# Patient Record
Sex: FEMALE | Race: BLACK OR AFRICAN AMERICAN | NOT HISPANIC OR LATINO | Employment: UNEMPLOYED | URBAN - METROPOLITAN AREA
[De-identification: names, ages, dates, MRNs, and addresses within clinical notes are randomized per-mention and may not be internally consistent; named-entity substitution may affect disease eponyms.]

---

## 2019-02-27 ENCOUNTER — CLINICAL SUPPORT (OUTPATIENT)
Dept: URGENT CARE | Facility: CLINIC | Age: 49
End: 2019-02-27
Payer: OTHER GOVERNMENT

## 2019-02-27 VITALS
BODY MASS INDEX: 40.91 KG/M2 | TEMPERATURE: 97 F | HEART RATE: 75 BPM | WEIGHT: 277 LBS | RESPIRATION RATE: 16 BRPM | SYSTOLIC BLOOD PRESSURE: 126 MMHG | OXYGEN SATURATION: 99 % | DIASTOLIC BLOOD PRESSURE: 75 MMHG

## 2019-02-27 DIAGNOSIS — J11.1 INFLUENZA: ICD-10-CM

## 2019-02-27 DIAGNOSIS — R50.9 FEVER, UNSPECIFIED FEVER CAUSE: ICD-10-CM

## 2019-02-27 DIAGNOSIS — R30.0 BURNING WITH URINATION: Primary | ICD-10-CM

## 2019-02-27 LAB
BILIRUB UR QL STRIP: NEGATIVE
CTP QC/QA: YES
FLUAV AG NPH QL: POSITIVE
FLUBV AG NPH QL: NEGATIVE
GLUCOSE SERPL-MCNC: 110 MG/DL (ref 70–110)
GLUCOSE UR QL STRIP: POSITIVE
KETONES UR QL STRIP: NEGATIVE
LEUKOCYTE ESTERASE UR QL STRIP: NEGATIVE
PH, POC UA: 5.5
POC BLOOD, URINE: NEGATIVE
POC NITRATES, URINE: NEGATIVE
PROT UR QL STRIP: NEGATIVE
SP GR UR STRIP: 1.02 (ref 1–1.03)
UROBILINOGEN UR STRIP-ACNC: ABNORMAL (ref 0.1–1.1)

## 2019-02-27 PROCEDURE — 87804 INFLUENZA ASSAY W/OPTIC: CPT | Mod: QW,,, | Performed by: NURSE PRACTITIONER

## 2019-02-27 PROCEDURE — 99204 OFFICE O/P NEW MOD 45 MIN: CPT | Mod: 25,S$GLB,, | Performed by: NURSE PRACTITIONER

## 2019-02-27 PROCEDURE — 81003 URINALYSIS AUTO W/O SCOPE: CPT | Mod: QW,S$GLB,, | Performed by: NURSE PRACTITIONER

## 2019-02-27 PROCEDURE — 81003 POCT URINALYSIS, DIPSTICK, AUTOMATED, W/O SCOPE: ICD-10-PCS | Mod: QW,S$GLB,, | Performed by: NURSE PRACTITIONER

## 2019-02-27 PROCEDURE — 82962 POCT GLUCOSE, HAND-HELD DEVICE: ICD-10-PCS | Mod: ,,, | Performed by: NURSE PRACTITIONER

## 2019-02-27 PROCEDURE — 87804 POCT INFLUENZA A/B: ICD-10-PCS | Mod: 59,QW,, | Performed by: NURSE PRACTITIONER

## 2019-02-27 PROCEDURE — 99204 PR OFFICE/OUTPT VISIT, NEW, LEVL IV, 45-59 MIN: ICD-10-PCS | Mod: 25,S$GLB,, | Performed by: NURSE PRACTITIONER

## 2019-02-27 PROCEDURE — 82962 GLUCOSE BLOOD TEST: CPT | Mod: ,,, | Performed by: NURSE PRACTITIONER

## 2019-02-27 RX ORDER — PIOGLITAZONEHYDROCHLORIDE 30 MG/1
30 TABLET ORAL DAILY
Refills: 3 | COMMUNITY
Start: 2019-01-23 | End: 2021-03-01

## 2019-02-27 RX ORDER — OMEPRAZOLE 20 MG/1
20 CAPSULE, DELAYED RELEASE ORAL DAILY
Refills: 2 | COMMUNITY
Start: 2019-01-23 | End: 2022-08-03

## 2019-02-27 RX ORDER — EMPAGLIFLOZIN AND LINAGLIPTIN 10; 5 MG/1; MG/1
10 TABLET, FILM COATED ORAL DAILY
Refills: 3 | COMMUNITY
Start: 2018-12-27 | End: 2021-03-01

## 2019-02-27 RX ORDER — VALSARTAN AND HYDROCHLOROTHIAZIDE 160; 25 MG/1; MG/1
160 TABLET ORAL DAILY
Refills: 3 | COMMUNITY
Start: 2019-01-23 | End: 2022-10-11 | Stop reason: SDUPTHER

## 2019-02-27 RX ORDER — NITROFURANTOIN 25; 75 MG/1; MG/1
100 CAPSULE ORAL 2 TIMES DAILY
Qty: 10 CAPSULE | Refills: 0 | Status: SHIPPED | OUTPATIENT
Start: 2019-02-27 | End: 2019-03-04

## 2019-02-27 NOTE — PROGRESS NOTES
"Subjective: post nasal drip, stuffy nose, runny nose, bodyaches, sore throat, right ear pain, pt also states "frequent urination with burning, cloudy urine"       Patient ID: Patria Bennett is a 48 y.o. female.    Vitals:  weight is 125.6 kg (277 lb). Her temperature is 97.1 °F (36.2 °C). Her blood pressure is 126/75 and her pulse is 75. Her respiration is 16 and oxygen saturation is 99%.     Chief Complaint: Sinus Problem (post nasal drip, stuffy nose, sore throat, cough) and Urinary Tract Infection (frequent urination, burning, cloudy urine)    Sinus Problem   This is a new problem. The current episode started in the past 7 days. Associated symptoms include chills, congestion, coughing, ear pain, sinus pressure and a sore throat. Pertinent negatives include no diaphoresis or shortness of breath. Past treatments include oral decongestants. The treatment provided mild relief.       Constitution: Positive for chills and fever. Negative for sweating and fatigue.   HENT: Positive for ear pain, congestion, sinus pressure and sore throat. Negative for sinus pain and voice change.    Neck: negative. Negative for painful lymph nodes.   Cardiovascular: Negative.    Eyes: Negative for eye redness.   Respiratory: Positive for cough. Negative for chest tightness, sputum production, bloody sputum, COPD, shortness of breath, stridor, wheezing and asthma.    Gastrointestinal: Negative for nausea and vomiting.   Endocrine: negative.   Genitourinary: Positive for dysuria and frequency.   Musculoskeletal: Negative.  Negative for muscle ache.   Skin: Negative for rash.   Allergic/Immunologic: Negative for seasonal allergies and asthma.   Neurological: Negative.    Hematologic/Lymphatic: Negative for swollen lymph nodes.   Psychiatric/Behavioral: Negative.        Objective:      Physical Exam   Constitutional: She is oriented to person, place, and time. She appears well-developed and well-nourished. She is cooperative.  Non-toxic " appearance. She does not appear ill. No distress.   HENT:   Head: Normocephalic and atraumatic.   Right Ear: Hearing, tympanic membrane, external ear and ear canal normal.   Left Ear: Hearing, tympanic membrane, external ear and ear canal normal.   Nose: Nose normal. No mucosal edema, rhinorrhea or nasal deformity. No epistaxis. Right sinus exhibits no maxillary sinus tenderness and no frontal sinus tenderness. Left sinus exhibits no maxillary sinus tenderness and no frontal sinus tenderness.   Mouth/Throat: Uvula is midline, oropharynx is clear and moist and mucous membranes are normal. No trismus in the jaw. Normal dentition. No uvula swelling. No posterior oropharyngeal erythema.   Eyes: Conjunctivae and lids are normal. No scleral icterus.   Sclera clear bilat   Neck: Trachea normal, full passive range of motion without pain and phonation normal. Neck supple.   Cardiovascular: Normal rate, regular rhythm, normal heart sounds, intact distal pulses and normal pulses.   Pulmonary/Chest: Effort normal and breath sounds normal. No respiratory distress.   Abdominal: Soft. Normal appearance and bowel sounds are normal. She exhibits no distension. There is no tenderness.   Musculoskeletal: Normal range of motion. She exhibits no edema or deformity.   Neurological: She is alert and oriented to person, place, and time. She exhibits normal muscle tone. Coordination normal.   Skin: Skin is warm, dry and intact. Capillary refill takes less than 2 seconds. She is not diaphoretic. No pallor.   Psychiatric: She has a normal mood and affect. Her speech is normal and behavior is normal. Judgment and thought content normal. Cognition and memory are normal.   Nursing note and vitals reviewed.      Assessment:       1. Burning with urination    2. Fever, unspecified fever cause    3. Influenza        Plan:     UA negative for infection. Will follow culture. Influenza A positive. Rx: macrobid and xofluza.     Burning with  urination  -     POCT Urinalysis, Dipstick, Automated, W/O Scope  -     Culture, Urine  -     nitrofurantoin, macrocrystal-monohydrate, (MACROBID) 100 MG capsule; Take 1 capsule (100 mg total) by mouth 2 (two) times daily. for 5 days  Dispense: 10 capsule; Refill: 0    Fever, unspecified fever cause  -     POCT Influenza A/B  -     POCT Glucose, Hand-Held Device    Influenza  -     baloxavir marboxil (XOFLUZA) 40 mg Tab; Take 80 mg by mouth once. for 1 dose  Dispense: 2 tablet; Refill: 0

## 2019-02-27 NOTE — PATIENT INSTRUCTIONS
"  Dysuria     Painful urination (dysuria) is often caused by a problem in the urinary tract.   Dysuria is pain felt during urination. It is often described as a burning. Learn more about this problem and how it can be treated.  What causes dysuria?  Possible causes include:  · Infection with a bacteria or virus such as a urinary tract infection (UTI or a sexually transmitted infection (STI)  · Sensitivity or allergy to chemicals such as those found in lotions and other products  · Prostate or bladder problems  · Radiation therapy to the pelvic area  How is dysuria diagnosed?  Your healthcare provider will examine you. He or she will ask about your symptoms and health. After talking with you and doing a physical exam, your healthcare provider may know what is causing your dysuria. He or she will usually request  a sample of your urine. Tests of your urine, or a "urinalysis," are done. A urinalysis may include:  · Looking at the urine sample (visual exam)  · Checking for substances (chemical exam)  · Looking at a small amount under a microscope (microscopic exam)  Some parts of the urinalysis may be done in the provider's office and some in a lab. And, the urine sample may be checked for bacteria and yeast (urine culture). Your healthcare provider will tell you more about these tests if they are needed.  How is dysuria treated?  Treatment depends on the cause. If you have a bacterial infection, you may need antibiotics. You may be given medicines to make it easier for you to urinate and help relieve pain. Your healthcare provider can tell you more about your treatment options. Untreated, symptoms may get worse.  When to call your healthcare provider  Call the healthcare provider right away if you have any of the following:  · Fever of 100.4°F (38°C) or higher   · No improvement after three days of treatment  · Trouble urinating because of pain  · New or increased discharge from the vagina or penis  · Rash or joint " pain  · Increased back or abdominal pain  · Enlarged painful lymph nodes (lumps) in the groin   Date Last Reviewed: 1/1/2017  © 8037-1618 [employer]. [address] [place] [address], [place] [address]. All rights reserved. This information is not intended as a substitute for professional medical care. Always follow your healthcare professional's instructions.        The Flu (Influenza)     The virus that causes the flu spreads through the air in droplets when someone who has the flu coughs, sneezes, laughs, or talks.   The flu (influenza) is an infection that affects your respiratory tract. This tract is made up of your mouth, nose, and lungs, and the passages between them. Unlike a cold, the flu can make you very ill. And it can lead to pneumonia, a serious lung infection. The flu can have serious complications and even cause death.  Who is at risk for the flu?  Anyone can get the flu. But you are more likely to become infected if you:  · Have a weakened immune system  · Work in a healthcare setting where you may be exposed to flu germs  · Live or work with someone who has the flu  · Havent had an annual flu shot  How does the flu spread?  The flu is caused by a virus. The virus spreads through the air in droplets when someone who has the flu coughs, sneezes, laughs, or talks. You can become infected when you inhale these viruses directly. You can also become infected when you touch a surface on which the droplets have landed and then transfer the germs to your eyes, nose, or mouth. Touching used tissues, or sharing utensils, drinking glasses, or a toothbrush from an infected person can expose you to flu viruses, too.  What are the symptoms of the flu?  Flu symptoms tend to come on quickly and may last a few days to a few weeks. They include:  · Fever usually higher than 100.4°F  (38°C) and chills  · Sore throat and headache  · Dry cough  · Runny nose  · Tiredness and weakness  · Muscle aches  Who is at risk  for flu complications?  For some people, the flu can be very serious. The risk for complications is greater for:  · Children younger than age 5  · Adults ages 65 and older  · People with a chronic illness such as diabetes or heart, kidney, or lung disease  · People who live in a nursing home or long-term care facility   How is the flu treated?  The flu usually gets better after 7 days or so. In some cases, your healthcare provider may prescribe an antiviral medicine. This may help you get well a little sooner. For the medicine to help, you need to take it as soon as possible (ideally within 48 hours) after your symptoms start. If you develop pneumonia or other serious illness, you may need to stay in the hospital.  Easing flu symptoms  · Drink lots of fluids such as water, juice, and warm soup. A good rule is to drink enough so that you urinate your normal amount.  · Get plenty of rest.  · Ask your healthcare provider what to take for fever and pain.  · Call your provider if your fever is 100.4°F (38°C) or higher, or you become dizzy, lightheaded, or short of breath.  Taking steps to protect others  · Wash your hands often, especially after coughing or sneezing. Or clean your hands with an alcohol-based hand  containing at least 60% alcohol.  · Cough or sneeze into a tissue. Then throw the tissue away and wash your hands. If you dont have a tissue, cough and sneeze into your elbow.  · Stay home until at least 24 hours after you no longer have a fever or chills. Be sure the fever isnt being hidden by fever-reducing medicine.  · Dont share food, utensils, drinking glasses, or a toothbrush with others.  · Ask your healthcare provider if others in your household should get antiviral medicine to help them avoid infection.  How can the flu be prevented?  · One of the best ways to avoid the flu is to get a flu vaccine each year. The virus that causes the flu changes from year to year. For that reason, healthcare  providers recommend getting the flu vaccine each year, as soon as it's available in your area. The vaccine is given as a shot. Your healthcare provider can tell you which vaccine is right for you. A nasal spray is also available but is not recommended for the 8226-8173 flu season. The CDC says this is because the nasal spray did not seem to protect against the flu over the last several flu seasons. In the past, it was meant for people ages 2 to 49.  · Wash your hands often. Frequent handwashing is a proven way to help prevent infection.  · Carry an alcohol-based hand gel containing at least 60% alcohol. Use it when you can't use soap and water. Then wash your hands as soon as you can.  · Avoid touching your eyes, nose, and mouth.  · At home and work, clean phones, computer keyboards, and toys often with disinfectant wipes.  · If possible, avoid close contact with others who have the flu or symptoms of the flu.  Handwashing tips  Handwashing is one of the best ways to prevent many common infections. If you are caring for or visiting someone with the flu, wash your hands each time you enter and leave the room. Follow these steps:  · Use warm water and plenty of soap. Rub your hands together well.  · Clean the whole hand, including under your nails, between your fingers, and up the wrists.  · Wash for at least 15 seconds.  · Rinse, letting the water run down your fingers, not up your wrists.  · Dry your hands well. Use a paper towel to turn off the faucet and open the door.  Using alcohol-based hand   Alcohol-based hand  are also a good choice. Use them when you can't use soap and water. Follow these steps:  · Squeeze about a tablespoon of gel into the palm of one hand.  · Rub your hands together briskly, cleaning the backs of your hands, the palms, between your fingers, and up the wrists.  · Rub until the gel is gone and your hands are completely dry.  Preventing the flu in healthcare settings  The flu  is a special concern for people in hospitals and long-term care facilities. To help prevent the spread of flu, many hospitals and nursing homes take these steps:  · Healthcare providers wash their hands or use an alcohol-based hand  before and after treating each patient.  · People with the flu have private rooms and bathrooms or share a room with someone with the same infection.  · People who are at high risk for the flu but don't have it are encouraged to get the flu and pneumonia vaccines.  · All healthcare workers are encouraged or required to get flu shots.   Date Last Reviewed: 12/1/2016  © 4930-9939 [employer]. [address] [place] [address], [place] [address]. All rights reserved. This information is not intended as a substitute for professional medical care. Always follow your healthcare professional's instructions.

## 2019-03-01 LAB
BACTERIA UR CULT: NORMAL
BACTERIA UR CULT: NORMAL

## 2019-08-26 ENCOUNTER — OFFICE VISIT (OUTPATIENT)
Dept: URGENT CARE | Facility: CLINIC | Age: 49
End: 2019-08-26
Payer: OTHER GOVERNMENT

## 2019-08-26 VITALS
WEIGHT: 284 LBS | HEIGHT: 69 IN | RESPIRATION RATE: 20 BRPM | DIASTOLIC BLOOD PRESSURE: 89 MMHG | TEMPERATURE: 99 F | HEART RATE: 97 BPM | SYSTOLIC BLOOD PRESSURE: 146 MMHG | BODY MASS INDEX: 42.06 KG/M2 | OXYGEN SATURATION: 98 %

## 2019-08-26 DIAGNOSIS — J02.9 SORE THROAT: Primary | ICD-10-CM

## 2019-08-26 DIAGNOSIS — R05.9 COUGH: ICD-10-CM

## 2019-08-26 DIAGNOSIS — J06.9 UPPER RESPIRATORY TRACT INFECTION, UNSPECIFIED TYPE: ICD-10-CM

## 2019-08-26 DIAGNOSIS — E11.9 TYPE 2 DIABETES MELLITUS WITHOUT COMPLICATION, WITHOUT LONG-TERM CURRENT USE OF INSULIN: ICD-10-CM

## 2019-08-26 DIAGNOSIS — J01.90 ACUTE NON-RECURRENT SINUSITIS, UNSPECIFIED LOCATION: ICD-10-CM

## 2019-08-26 LAB
CTP QC/QA: YES
GLUCOSE SERPL-MCNC: 144 MG/DL (ref 70–110)
S PYO RRNA THROAT QL PROBE: NEGATIVE

## 2019-08-26 PROCEDURE — 99214 PR OFFICE/OUTPT VISIT, EST, LEVL IV, 30-39 MIN: ICD-10-PCS | Mod: 25,S$GLB,, | Performed by: NURSE PRACTITIONER

## 2019-08-26 PROCEDURE — 82962 POCT GLUCOSE, HAND-HELD DEVICE: ICD-10-PCS | Mod: ,,, | Performed by: NURSE PRACTITIONER

## 2019-08-26 PROCEDURE — 82962 GLUCOSE BLOOD TEST: CPT | Mod: ,,, | Performed by: NURSE PRACTITIONER

## 2019-08-26 PROCEDURE — 99214 OFFICE O/P EST MOD 30 MIN: CPT | Mod: 25,S$GLB,, | Performed by: NURSE PRACTITIONER

## 2019-08-26 PROCEDURE — 87880 STREP A ASSAY W/OPTIC: CPT | Mod: QW,,, | Performed by: NURSE PRACTITIONER

## 2019-08-26 PROCEDURE — 87880 POCT RAPID STREP A: ICD-10-PCS | Mod: QW,,, | Performed by: NURSE PRACTITIONER

## 2019-08-26 RX ORDER — CODEINE PHOSPHATE AND GUAIFENESIN 10; 100 MG/5ML; MG/5ML
5 SOLUTION ORAL 3 TIMES DAILY PRN
Qty: 150 ML | Refills: 0 | Status: SHIPPED | OUTPATIENT
Start: 2019-08-26 | End: 2019-09-05

## 2019-08-26 RX ORDER — PREDNISONE 20 MG/1
20 TABLET ORAL DAILY
Qty: 5 TABLET | Refills: 0 | Status: SHIPPED | OUTPATIENT
Start: 2019-08-26 | End: 2019-08-31

## 2019-08-26 RX ORDER — DEXAMETHASONE SODIUM PHOSPHATE 4 MG/ML
8 INJECTION, SOLUTION INTRA-ARTICULAR; INTRALESIONAL; INTRAMUSCULAR; INTRAVENOUS; SOFT TISSUE
Status: DISCONTINUED | OUTPATIENT
Start: 2019-08-26 | End: 2020-03-15

## 2019-08-26 RX ORDER — FERROUS SULFATE 325(65) MG
325 TABLET ORAL DAILY
Refills: 2 | COMMUNITY
Start: 2019-07-19 | End: 2021-11-30 | Stop reason: CLARIF

## 2019-08-26 RX ORDER — AMOXICILLIN AND CLAVULANATE POTASSIUM 875; 125 MG/1; MG/1
1 TABLET, FILM COATED ORAL 2 TIMES DAILY
Qty: 14 TABLET | Refills: 0 | Status: SHIPPED | OUTPATIENT
Start: 2019-08-26 | End: 2019-09-02

## 2019-08-26 RX ORDER — METFORMIN HYDROCHLORIDE 1000 MG/1
1000 TABLET ORAL 2 TIMES DAILY WITH MEALS
COMMUNITY
End: 2022-05-17 | Stop reason: SDUPTHER

## 2019-08-26 NOTE — PROGRESS NOTES
"Subjective:       Patient ID: Patria Bennett is a 49 y.o. female.    Vitals:  height is 5' 9" (1.753 m) and weight is 128.8 kg (284 lb). Her temperature is 98.8 °F (37.1 °C). Her blood pressure is 146/89 (abnormal) and her pulse is 97. Her respiration is 20 and oxygen saturation is 98%.     Chief Complaint: Sinus Problem    Ms. Bennett presents today with complaints of sinus congestion. It is mild. It is associated with neck pain, left ear pain, sore throat, cough, and sweats. She denies fever, N/V/D, or known sick contacts.    Sinus Problem   Episode onset: saturday  Associated symptoms include congestion, coughing, ear pain, headaches, neck pain, sinus pressure and a sore throat. Pertinent negatives include no chills, diaphoresis or shortness of breath. Treatments tried: aleeve sinus and cold , claritan,  The treatment provided mild relief.       Constitution: Negative for chills, sweating, fatigue and fever.   HENT: Positive for ear pain, congestion, sinus pressure and sore throat. Negative for sinus pain and voice change.    Neck: Positive for neck pain. Negative for painful lymph nodes.   Eyes: Negative for eye redness.   Respiratory: Positive for cough. Negative for chest tightness, sputum production, bloody sputum, COPD, shortness of breath, stridor, wheezing and asthma.    Gastrointestinal: Negative for nausea and vomiting.   Musculoskeletal: Negative for muscle ache.   Skin: Negative for rash.   Allergic/Immunologic: Negative for seasonal allergies and asthma.   Neurological: Positive for headaches.   Hematologic/Lymphatic: Negative for swollen lymph nodes.       Objective:      Physical Exam   Constitutional: She is oriented to person, place, and time. She appears well-developed and well-nourished. She is cooperative.  Non-toxic appearance. She does not appear ill. No distress.   HENT:   Head: Normocephalic and atraumatic.   Right Ear: Hearing, tympanic membrane and ear canal normal.   Left Ear: Hearing, " tympanic membrane and ear canal normal.   Nose: Nose normal. No mucosal edema, rhinorrhea or nasal deformity. No epistaxis. Right sinus exhibits no maxillary sinus tenderness and no frontal sinus tenderness. Left sinus exhibits no maxillary sinus tenderness and no frontal sinus tenderness.   Mouth/Throat: Uvula is midline and mucous membranes are normal. No trismus in the jaw. Normal dentition. No uvula swelling. No posterior oropharyngeal erythema.   bilat OME, oropharynx slightly erythematous    Eyes: Conjunctivae and lids are normal. No scleral icterus.   Sclera clear bilat   Neck: Trachea normal, full passive range of motion without pain and phonation normal. Neck supple.   Cardiovascular: Normal rate, regular rhythm, normal heart sounds, intact distal pulses and normal pulses.   Pulmonary/Chest: Effort normal and breath sounds normal. No respiratory distress.   Abdominal: Soft. Normal appearance and bowel sounds are normal. She exhibits no distension. There is no tenderness.   Musculoskeletal: Normal range of motion. She exhibits no edema or deformity.   Lymphadenopathy:     She has cervical adenopathy.   Neurological: She is alert and oriented to person, place, and time. She exhibits normal muscle tone. Coordination normal.   Skin: Skin is warm, dry and intact. Capillary refill takes less than 2 seconds. She is not diaphoretic. No pallor.   Psychiatric: She has a normal mood and affect. Her speech is normal and behavior is normal. Judgment and thought content normal. Cognition and memory are normal.   Nursing note and vitals reviewed.      Assessment:       1. Sore throat    2. Type 2 diabetes mellitus without complication, without long-term current use of insulin    3. Upper respiratory tract infection, unspecified type    4. Acute non-recurrent sinusitis, unspecified location    5. Cough        Plan:         Sore throat  -     POCT rapid strep A    Type 2 diabetes mellitus without complication, without  long-term current use of insulin  -     POCT Glucose, Hand-Held Device    Upper respiratory tract infection, unspecified type  -     dexamethasone injection 8 mg  -     predniSONE (DELTASONE) 20 MG tablet; Take 1 tablet (20 mg total) by mouth once daily. for 5 days  Dispense: 5 tablet; Refill: 0    Acute non-recurrent sinusitis, unspecified location  -     amoxicillin-clavulanate 875-125mg (AUGMENTIN) 875-125 mg per tablet; Take 1 tablet by mouth 2 (two) times daily. for 7 days  Dispense: 14 tablet; Refill: 0    Cough  -     guaifenesin-codeine 100-10 mg/5 ml (TUSSI-ORGANIDIN NR)  mg/5 mL syrup; Take 5 mLs by mouth 3 (three) times daily as needed for Cough.  Dispense: 150 mL; Refill: 0       Glucose 144, rapid strep negative

## 2019-08-26 NOTE — PATIENT INSTRUCTIONS
When to Use Antibiotics   Antibiotics are medicines used to treat infections caused by bacteria. They dont work for illnesses caused by viruses or an allergic reaction. In fact, taking antibiotics for reasons other than a bacterial infection can cause problems. For example, you may have side effects from the medicine. And if you really need an antibiotic, it may not work well.                                                                                                                                              When antibiotics wont help  Your healthcare provider wont usually prescribe antibiotics for the following conditions. You can help by not asking for them if you have:   · A cold. This type of illness is caused by a virus. It can cause a runny nose, stuffed-up nose, sneezing, coughing, headache, mild body aches, and low fever. A cold gets better on its own in a few days to a week.  · The flu (influenza). This is a respiratory illness caused by a virus. The flu usually goes away on its own in a week or so. It can cause fever, body aches, sore throat, and fatigue.  · Bronchitis. This is an infection in the lungs most often caused by a virus. You may have coughing, phlegm, body aches, and a low fever. A common type of bronchitis is known as a chest cold (acute bronchitis). This often happens after you have a respiratory infection like a common cold. Bronchitis can take weeks to go away, but antibiotics usually dont help.  · Most sore throats. Sore throats are most often caused by viruses. Your throat may feel scratchy or achy, and it may hurt to swallow. You may also have a low fever and body aches. A sore throat usually gets better in a few days.  · Most ear infections. An ear infection may be caused by a virus or bacteria. It causes pain in the ear. Antibiotics usually dont help, and the infection goes away on its own.  · Most sinus infections (sinusitis). This kind of infection causes sinus pain and  swelling, and a runny nose. In most cases, sinusitis goes away on its own, and antibiotics dont make recovery quicker.  · Allergic rhinitis. This is a set of symptoms caused by an allergic reaction. You may have sneezing, a runny nose, itchy or watery eyes, or a sore throat. Allergies are not treated with antibiotics.  · Low fever. A mild fever thats less than 100.4°F (38°C) most likely doesnt need treatment with antibiotics.   When antibiotics can help   Antibiotics can be used to treat:                                                     · Strep throat. This is a throat infectioncaused by a certain type of bacteria. Symptoms of strep throat include a sore throat, white patches on the tonsils, red spots on the roof of the mouth, fever, body aches, and nausea and vomiting.  · Urinary tract infection (UTI). This is a bacterial infection of the bladder and the tube that takes urine out of the body. It can cause burning pain and urine thats cloudy or tinted with blood. UTIs are very common. Antibiotics usually help treat these infections.  · Some ear infections. In some cases, a healthcare provider may prescribe antibiotics for an ear infection. You may need a test to show whats causing the ear infection.  · Some sinus infections. In some cases, yourhealthcare provider may give you antibiotics. He or she may first need to make sure your symptoms arent caused by a virus, fungus, allergies, or air pollutants such as smoke.   Your doctor may also recommend antibiotics if you have a condition that can affect your immune system, such as diabetes or cancer.   Self-care at home   If your infection cant be treated with antibiotics, you can take other steps to feel better. Try the remedies below. In general:   · Rest and sleep as much as needed.  · Drink water and other clear fluids.  · Dont smoke, and avoid smoke from other people.  · Use over-the-counter medicine such as acetaminophen to ease pain or fever, as  directed by your healthcare provider.   To treat sinus pain or nasal congestion:   · Put a warm, moist washcloth on your face where you feel sinus pain or pressure.  · Use a nasal spray with medicine or saline, as directed by your healthcare provider.  · Breathe in steam from a hot shower.  · Use a humidifier or cool mist vaporizer.   To quiet a cough:   · Use a humidifier or cool mist vaporizer.  · Breathe in steam from a hot shower.  · Use cough lozenges.   To sooth a sore throat:   · Suck on ice chips, popsicles, or lozenges.  · Use a sore throat spray.  · Use a humidifier or cool mist vaporizer.  · Gargle with saltwater.  · Drink warm liquids.   To ease ear pain:   · Hold a warm, moist washcloth on the ear for 10 minutes at a time.  Date Last Reviewed: 9/1/2016  © 8171-4069 [employer]. [address] [place] [address], [place] [address]. All rights reserved. This information is not intended as a substitute for professional medical care. Always follow your healthcare professional's instructions.

## 2020-03-12 ENCOUNTER — CLINICAL SUPPORT (OUTPATIENT)
Dept: URGENT CARE | Facility: CLINIC | Age: 50
End: 2020-03-12
Payer: OTHER GOVERNMENT

## 2020-03-12 VITALS
OXYGEN SATURATION: 95 % | TEMPERATURE: 98 F | DIASTOLIC BLOOD PRESSURE: 81 MMHG | RESPIRATION RATE: 16 BRPM | BODY MASS INDEX: 41.79 KG/M2 | HEART RATE: 88 BPM | SYSTOLIC BLOOD PRESSURE: 128 MMHG | WEIGHT: 283 LBS

## 2020-03-12 DIAGNOSIS — J18.9 PNEUMONIA OF RIGHT LOWER LOBE DUE TO INFECTIOUS ORGANISM: Primary | ICD-10-CM

## 2020-03-12 DIAGNOSIS — R05.9 COUGH: ICD-10-CM

## 2020-03-12 DIAGNOSIS — E11.9 TYPE 2 DIABETES MELLITUS WITHOUT COMPLICATION, UNSPECIFIED WHETHER LONG TERM INSULIN USE: ICD-10-CM

## 2020-03-12 LAB
CTP QC/QA: YES
FLUAV AG NPH QL: NEGATIVE
FLUBV AG NPH QL: NEGATIVE
GLUCOSE SERPL-MCNC: 118 MG/DL (ref 70–110)

## 2020-03-12 PROCEDURE — 87804 INFLUENZA ASSAY W/OPTIC: CPT | Mod: QW,,, | Performed by: NURSE PRACTITIONER

## 2020-03-12 PROCEDURE — 94640 AIRWAY INHALATION TREATMENT: CPT | Mod: S$GLB,,, | Performed by: NURSE PRACTITIONER

## 2020-03-12 PROCEDURE — 82962 GLUCOSE BLOOD TEST: CPT | Mod: ,,, | Performed by: NURSE PRACTITIONER

## 2020-03-12 PROCEDURE — 99214 OFFICE O/P EST MOD 30 MIN: CPT | Mod: 25,S$GLB,, | Performed by: NURSE PRACTITIONER

## 2020-03-12 PROCEDURE — 87804 POCT INFLUENZA A/B: ICD-10-PCS | Mod: 59,QW,, | Performed by: NURSE PRACTITIONER

## 2020-03-12 PROCEDURE — 71046 X-RAY EXAM CHEST 2 VIEWS: CPT | Mod: S$GLB,,, | Performed by: NURSE PRACTITIONER

## 2020-03-12 PROCEDURE — 94640 PR INHAL RX, AIRWAY OBST/DX SPUTUM INDUCT: ICD-10-PCS | Mod: S$GLB,,, | Performed by: NURSE PRACTITIONER

## 2020-03-12 PROCEDURE — 82962 POCT GLUCOSE, HAND-HELD DEVICE: ICD-10-PCS | Mod: ,,, | Performed by: NURSE PRACTITIONER

## 2020-03-12 PROCEDURE — 99214 PR OFFICE/OUTPT VISIT, EST, LEVL IV, 30-39 MIN: ICD-10-PCS | Mod: 25,S$GLB,, | Performed by: NURSE PRACTITIONER

## 2020-03-12 PROCEDURE — 71046 PR XRAY, CHEST, 2 VIEWS: ICD-10-PCS | Mod: S$GLB,,, | Performed by: NURSE PRACTITIONER

## 2020-03-12 RX ORDER — ALBUTEROL SULFATE 90 UG/1
2 AEROSOL, METERED RESPIRATORY (INHALATION) EVERY 6 HOURS PRN
Qty: 18 G | Refills: 0 | Status: SHIPPED | OUTPATIENT
Start: 2020-03-12 | End: 2020-03-15

## 2020-03-12 RX ORDER — AZITHROMYCIN 250 MG/1
TABLET, FILM COATED ORAL
Qty: 6 TABLET | Refills: 0 | Status: SHIPPED | OUTPATIENT
Start: 2020-03-12 | End: 2020-03-15

## 2020-03-12 RX ORDER — IPRATROPIUM BROMIDE 0.5 MG/2.5ML
0.5 SOLUTION RESPIRATORY (INHALATION)
Status: COMPLETED | OUTPATIENT
Start: 2020-03-12 | End: 2020-03-12

## 2020-03-12 RX ORDER — ALBUTEROL SULFATE 0.83 MG/ML
2.5 SOLUTION RESPIRATORY (INHALATION)
Status: COMPLETED | OUTPATIENT
Start: 2020-03-12 | End: 2020-03-12

## 2020-03-12 RX ADMIN — IPRATROPIUM BROMIDE 0.5 MG: 0.5 SOLUTION RESPIRATORY (INHALATION) at 11:03

## 2020-03-12 RX ADMIN — ALBUTEROL SULFATE 2.5 MG: 0.83 SOLUTION RESPIRATORY (INHALATION) at 11:03

## 2020-03-12 NOTE — LETTER
March 12, 2020      [facility]  [address]  [place] [address]  Phone: [phone]       Patient: Patria Bennett   YOB: 1970  Date of Visit: 03/12/2020    To Whom It May Concern:    Donis Bennett  was at [facility] on 03/12/2020. She may return to work/school on 03/16/2020 with no restrictions. If you have any questions or concerns, or if I can be of further assistance, please do not hesitate to contact me.    Sincerely,    Evonne Chung MA

## 2020-03-12 NOTE — PATIENT INSTRUCTIONS
Pneumonia (Adult)  Pneumonia is an infection deep within the lungs. It is in the small air sacs (alveoli). Pneumonia may be caused by a virus or bacteria. Pneumonia caused by bacteria is usually treated with an antibiotic. Severe cases may need to be treated in the hospital. Milder cases can be treated at home. Symptoms usually start to get better during the first 2 days of treatment.    Home care  Follow these guidelines when caring for yourself at home:  · Rest at home for the first 2 to 3 days, or until you feel stronger. Dont let yourself get overly tired when you go back to your activities.  · Stay away from cigarette smoke - yours or other peoples.  · You may use acetaminophen or ibuprofen to control fever or pain, unless another medicine was prescribed. If you have chronic liver or kidney disease, talk with your healthcare provider before using these medicines. Also talk with your provider if youve had a stomach ulcer or gastrointestinal bleeding. Dont give aspirin to anyone younger than 18 years of age who is ill with a fever. It may cause severe liver damage.  · Your appetite may be poor, so a light diet is fine.  · Drink 6 to 8 glasses of fluids every day to make sure you are getting enough fluids. Beverages can include water, sport drinks, sodas without caffeine, juices, tea, or soup. Fluids will help loosen secretions in the lung. This will make it easier for you to cough up the phlegm (sputum). If you also have heart or kidney disease, check with your healthcare provider before you drink extra fluids.  · Take antibiotic medicine prescribed until it is all gone, even if you are feeling better after a few days.  Follow-up care  Follow up with your healthcare provider in the next 2 to 3 days, or as advised. This is to be sure the medicine is helping you get better.  If you are 65 or older, you should get a pneumococcal vaccine and a yearly flu (influenza) shot. You should also get these vaccines if  you have chronic lung disease like asthma, emphysema, or COPD. Recently, a second type of pneumonia vaccine has become available for everyone over 65 years old. This is in addition to the previous vaccine. Ask your provider about this.  When to seek medical advice  Call your healthcare provider right away if any of these occur:  · You dont get better within the first 48 hours of treatment  · Shortness of breath gets worse  · Rapid breathing (more than 25 breaths per minute)  · Coughing up blood  · Chest pain gets worse with breathing  · Fever of 100.4°F (38°C) or higher that doesnt get better with fever medicine  · Weakness, dizziness, or fainting that gets worse  · Thirst or dry mouth that gets worse  · Sinus pain, headache, or a stiff neck  · Chest pain not caused by coughing  Date Last Reviewed: 1/1/2017  © 5379-2967 [employer]. [address] [place] [address]. All rights reserved. This information is not intended as a substitute for professional medical care. Always follow your healthcare professional's instructions.        Treating Pneumonia  Pneumonia is an infection of one or both of the lungs. Pneumonia:  · Is usually caused by either a virus or a bacteria  · Can be very serious, especially in infants, young children, and older adults. Its also serious for those with other long-term health problems or weakened immune systems.  · Is sometimes treated at home and sometimes in the hospital    Antibiotic medicines  Antibiotics may be prescribed for pneumonia caused by bacteria. They may be pills (oral medicines), or shots (injections). Or they may be given by IV (intravenously) into a vein. If you are taking oral medicines at home:  · Fill your prescription and start taking your medicine as soon as you can.  · You will likely start to feel better in a day or 2, but dont stop taking the antibiotic.  · Use a pill organizer to help you remember to take your medicine.  · Let  your healthcare provider know if you have side effects.  · Take your medicine exactly as directed on the label. Talk to your provider or pharmacist if you have any questions.  Antiviral medicines  Antiviral medicine may be prescribed for pneumonia caused by a virus. For example, antiviral medicine may be prescribed for pneumonia caused by the flu virus. Antibiotics do not work against viruses. If you are taking antiviral medicine at home:  · Fill your prescription and start taking your medicine as soon as you can.  · Talk with your provider or pharmacist about possible side effects.  · Take the medicine exactly as instructed.  To relieve symptoms  There are many medicines that can help relieve symptoms of pneumonia. Some are prescription and some are over-the-counter.  Your healthcare provider may recommend:  · Acetaminophen or ibuprofen to lower your fever and to lessen headache or other pain  · Cough medicine to loosen mucus or to reduce coughing  Make sure you check with your healthcare provider or pharmacist before taking any over-the-counter medicines.  Special treatments  If you are hospitalized for pneumonia, you may have other therapies, including:  · Inhaled medicines to help with breathing or chest congestion  · Supplemental oxygen to increase low oxygen levels  Drink fluids and eat healthy  You should eat healthy to help your body fight the infection. Drinking a lot of fluids helps to replace fluids lost from fever and to loosen mucus in your chest.  · Diet. Make healthy food choices, including fruits and vegetables, lean meats and other proteins, 100% whole grain and low- or no-fat dairy products.  · Fluids. Drink at least 6 to 8 tall glasses a day. Water and 100% fruit or vegetable juice are best.  Get plenty of rest and sleep  You may be more tired than usual for a while. It is important to get enough sleep at night. Its also important to rest during the day. Talk with your healthcare provider if  coughing or other symptoms are interfering with your sleep.  Preventing the spread of germs  The best thing you can do to prevent spreading germs is to wash your hands often. You should:  · Rub your hand with soap and water for 20 to 30 seconds.  · Clean in between your fingers, the backs of your hands, and around your nails.  · Dry your hands on a separate towel or use paper towels.  You should also:  · Keep alcohol-based hand  nearby.  · Make sure you also clean surfaces that you touch. Use a product that kills all types of germs.  · Stay away from others until you are feeling better.  When to call your healthcare provider  Call your healthcare provider if you have any of the following:  · Symptoms get worse  · Fever continues  · Shortness of breath gets worse  · Increased mucus or mucus that is darker in color  · Coughing gets worse  · Lips or fingers are bluish in color  · Side effects from your medicine   Date Last Reviewed: 12/1/2016  © 9324-9538 [employer]. [address] [place] [address], [place] [address]. All rights reserved. This information is not intended as a substitute for professional medical care. Always follow your healthcare professional's instructions.

## 2020-03-12 NOTE — PROGRESS NOTES
Subjective: cough, body aches, HA       Patient ID: Patria Bennett is a 49 y.o. female.    Vitals:  weight is 128.4 kg (283 lb). Her temperature is 97.7 °F (36.5 °C). Her blood pressure is 128/81 and her pulse is 88. Her respiration is 16 and oxygen saturation is 95%.     Chief Complaint: Cough (cough, body aches, HA)    Patient complains of cough and wheezing for a few days. Denies fever and shortness of breath, chest pain, nausea, vomiting, diarrhea.     Cough   This is a new problem. The current episode started in the past 7 days. Associated symptoms include headaches and wheezing. Pertinent negatives include no chest pain, chills, fever, myalgias, rash, sore throat or shortness of breath.       Constitution: Negative for chills, fatigue, fever and international travel in last 60 days.   HENT: Negative for congestion and sore throat.    Neck: Negative for painful lymph nodes.   Cardiovascular: Negative for chest pain and leg swelling.   Eyes: Negative for double vision and blurred vision.   Respiratory: Positive for cough and wheezing. Negative for shortness of breath.    Gastrointestinal: Negative for abdominal pain, nausea, vomiting and diarrhea.   Genitourinary: Negative for dysuria, frequency, urgency and history of kidney stones.   Musculoskeletal: Negative for joint pain, joint swelling, muscle cramps and muscle ache.   Skin: Negative for color change, pale, rash and bruising.   Allergic/Immunologic: Negative for seasonal allergies.   Neurological: Positive for headaches. Negative for dizziness, history of vertigo, light-headedness and passing out.   Hematologic/Lymphatic: Negative for swollen lymph nodes.   Psychiatric/Behavioral: Negative for nervous/anxious, sleep disturbance and depression. The patient is not nervous/anxious.        Objective:      Physical Exam   Constitutional: She is oriented to person, place, and time. Vital signs are normal. She appears well-developed and well-nourished. She is  cooperative.  Non-toxic appearance. She does not have a sickly appearance. She does not appear ill. No distress.   HENT:   Head: Normocephalic and atraumatic.   Right Ear: Hearing, tympanic membrane, external ear and ear canal normal.   Left Ear: Hearing, tympanic membrane, external ear and ear canal normal.   Nose: Nose normal. No mucosal edema, rhinorrhea or nasal deformity. No epistaxis. Right sinus exhibits no maxillary sinus tenderness and no frontal sinus tenderness. Left sinus exhibits no maxillary sinus tenderness and no frontal sinus tenderness.   Mouth/Throat: Uvula is midline, oropharynx is clear and moist and mucous membranes are normal. No trismus in the jaw. Normal dentition. No uvula swelling. No posterior oropharyngeal erythema.   Eyes: Conjunctivae and lids are normal. Right eye exhibits no discharge. Left eye exhibits no discharge. No scleral icterus.   Neck: Trachea normal, normal range of motion, full passive range of motion without pain and phonation normal. Neck supple.   Cardiovascular: Normal rate, regular rhythm, normal heart sounds, intact distal pulses and normal pulses.   Pulmonary/Chest: Effort normal and breath sounds normal. No stridor. No respiratory distress. She has no decreased breath sounds. She has no wheezes. She has no rhonchi. She has no rales.   Abdominal: Soft. Normal appearance and bowel sounds are normal. She exhibits no distension, no pulsatile midline mass and no mass. There is no tenderness.   Musculoskeletal: Normal range of motion. She exhibits no edema or deformity.   Neurological: She is alert and oriented to person, place, and time. She exhibits normal muscle tone. Coordination normal. GCS eye subscore is 4. GCS verbal subscore is 5. GCS motor subscore is 6.   Skin: Skin is warm, dry, intact, not diaphoretic and not pale.   Psychiatric: She has a normal mood and affect. Her speech is normal and behavior is normal. Judgment and thought content normal. Cognition and  memory are normal.   Nursing note and vitals reviewed.        Assessment:       1. Pneumonia of right lower lobe due to infectious organism    2. Type 2 diabetes mellitus without complication, unspecified whether long term insulin use    3. Cough        Plan:       CBG = 118  Patient reports she does not feel any better since the breathing treatment. Sats decreased from 94% to 91%, will get a CXR. CXR shows questionable area of consolidation to right lower lung, will await official chest xray read, but will treat with Zpack out of an abundance of caution.     Advised patient to go to the ER for any worsening of symptoms or failure to improve within the next 48 hours.     Advised patient: Monitor your blood sugar closely for the next few days since you received a steroid injection today and steroids can cause your blood sugar to go up.       Official CXR read: no acute findings.     Pneumonia of right lower lobe due to infectious organism  -     XR CHEST PA AND LATERAL; Future; Expected date: 03/12/2020    Type 2 diabetes mellitus without complication, unspecified whether long term insulin use  -     POCT Glucose, Hand-Held Device    Cough  -     POCT Influenza A/B    Other orders  -     albuterol nebulizer solution 2.5 mg  -     ipratropium 0.02 % nebulizer solution 0.5 mg  -     albuterol (PROVENTIL HFA) 90 mcg/actuation inhaler; Inhale 2 puffs into the lungs every 6 (six) hours as needed for Wheezing. Rescue  Dispense: 18 g; Refill: 0  -     azithromycin (ZITHROMAX) 250 MG tablet; Take 2 tablets (500 mg) on  Day 1,  followed by 1 tablet (250 mg) once daily on Days 2 through 5.  Dispense: 6 tablet; Refill: 0

## 2020-03-14 ENCOUNTER — HOSPITAL ENCOUNTER (INPATIENT)
Facility: HOSPITAL | Age: 50
LOS: 11 days | Discharge: HOME | DRG: 871 | End: 2020-03-25
Attending: EMERGENCY MEDICINE | Admitting: STUDENT IN AN ORGANIZED HEALTH CARE EDUCATION/TRAINING PROGRAM
Payer: OTHER GOVERNMENT

## 2020-03-14 DIAGNOSIS — J18.9 PNEUMONIA DUE TO INFECTIOUS ORGANISM, UNSPECIFIED LATERALITY, UNSPECIFIED PART OF LUNG: Primary | ICD-10-CM

## 2020-03-14 DIAGNOSIS — A41.9 SEPSIS DUE TO PNEUMONIA: ICD-10-CM

## 2020-03-14 DIAGNOSIS — J18.9 PNEUMONIA: ICD-10-CM

## 2020-03-14 DIAGNOSIS — R50.9 COUGH WITH FEVER: ICD-10-CM

## 2020-03-14 DIAGNOSIS — J18.9 PNEUMONIA OF BOTH LUNGS DUE TO INFECTIOUS ORGANISM, UNSPECIFIED PART OF LUNG: ICD-10-CM

## 2020-03-14 DIAGNOSIS — J96.01 ACUTE HYPOXEMIC RESPIRATORY FAILURE: ICD-10-CM

## 2020-03-14 DIAGNOSIS — J18.9 SEPSIS DUE TO PNEUMONIA: ICD-10-CM

## 2020-03-14 DIAGNOSIS — R05.9 COUGH WITH FEVER: ICD-10-CM

## 2020-03-14 DIAGNOSIS — R07.9 CHEST PAIN: ICD-10-CM

## 2020-03-14 PROBLEM — I10 ESSENTIAL HYPERTENSION: Status: ACTIVE | Noted: 2018-10-09

## 2020-03-14 PROBLEM — E11.9 TYPE 2 DIABETES MELLITUS WITHOUT COMPLICATION: Status: ACTIVE | Noted: 2020-03-14

## 2020-03-14 PROBLEM — G47.00 INSOMNIA: Status: ACTIVE | Noted: 2020-03-14

## 2020-03-14 PROBLEM — K21.9 GASTROESOPHAGEAL REFLUX DISEASE: Status: ACTIVE | Noted: 2018-10-09

## 2020-03-14 LAB
ALBUMIN SERPL BCP-MCNC: 3.6 G/DL (ref 3.5–5.2)
ALP SERPL-CCNC: 44 U/L (ref 55–135)
ALT SERPL W/O P-5'-P-CCNC: 29 U/L (ref 10–44)
ANION GAP SERPL CALC-SCNC: 9 MMOL/L (ref 8–16)
AST SERPL-CCNC: 22 U/L (ref 10–40)
BASOPHILS # BLD AUTO: 0.01 K/UL (ref 0–0.2)
BASOPHILS NFR BLD: 0.2 % (ref 0–1.9)
BILIRUB SERPL-MCNC: 0.8 MG/DL (ref 0.1–1)
BNP SERPL-MCNC: 19 PG/ML (ref 0–99)
BUN SERPL-MCNC: 11 MG/DL (ref 6–20)
CALCIUM SERPL-MCNC: 8.7 MG/DL (ref 8.7–10.5)
CHLORIDE SERPL-SCNC: 101 MMOL/L (ref 95–110)
CO2 SERPL-SCNC: 24 MMOL/L (ref 23–29)
CREAT SERPL-MCNC: 0.9 MG/DL (ref 0.5–1.4)
DIFFERENTIAL METHOD: ABNORMAL
EOSINOPHIL # BLD AUTO: 0 K/UL (ref 0–0.5)
EOSINOPHIL NFR BLD: 0 % (ref 0–8)
ERYTHROCYTE [DISTWIDTH] IN BLOOD BY AUTOMATED COUNT: 14 % (ref 11.5–14.5)
EST. GFR  (AFRICAN AMERICAN): >60 ML/MIN/1.73 M^2
EST. GFR  (NON AFRICAN AMERICAN): >60 ML/MIN/1.73 M^2
GLUCOSE SERPL-MCNC: 224 MG/DL (ref 70–110)
HCT VFR BLD AUTO: 36.4 % (ref 37–48.5)
HGB BLD-MCNC: 11.5 G/DL (ref 12–16)
IMM GRANULOCYTES # BLD AUTO: 0.01 K/UL (ref 0–0.04)
IMM GRANULOCYTES NFR BLD AUTO: 0.2 % (ref 0–0.5)
INFLUENZA A, MOLECULAR: NEGATIVE
INFLUENZA B, MOLECULAR: NEGATIVE
LACTATE SERPL-SCNC: 2.1 MMOL/L (ref 0.5–1.9)
LYMPHOCYTES # BLD AUTO: 1.1 K/UL (ref 1–4.8)
LYMPHOCYTES NFR BLD: 16.8 % (ref 18–48)
MAGNESIUM SERPL-MCNC: 1.3 MG/DL (ref 1.6–2.6)
MCH RBC QN AUTO: 26.7 PG (ref 27–31)
MCHC RBC AUTO-ENTMCNC: 31.6 G/DL (ref 32–36)
MCV RBC AUTO: 85 FL (ref 82–98)
MONOCYTES # BLD AUTO: 0.4 K/UL (ref 0.3–1)
MONOCYTES NFR BLD: 6.7 % (ref 4–15)
NEUTROPHILS # BLD AUTO: 4.9 K/UL (ref 1.8–7.7)
NEUTROPHILS NFR BLD: 76.1 % (ref 38–73)
NRBC BLD-RTO: 0 /100 WBC
PHOSPHATE SERPL-MCNC: 2.3 MG/DL (ref 2.7–4.5)
PLATELET # BLD AUTO: 195 K/UL (ref 150–350)
PLATELET BLD QL SMEAR: ABNORMAL
PMV BLD AUTO: 10.6 FL (ref 9.2–12.9)
POTASSIUM SERPL-SCNC: 3.6 MMOL/L (ref 3.5–5.1)
PROCALCITONIN SERPL IA-MCNC: <0.05 NG/ML (ref 0–0.5)
PROCALCITONIN SERPL IA-MCNC: <0.05 NG/ML (ref 0–0.5)
PROT SERPL-MCNC: 7 G/DL (ref 6–8.4)
RBC # BLD AUTO: 4.3 M/UL (ref 4–5.4)
SODIUM SERPL-SCNC: 134 MMOL/L (ref 136–145)
SPECIMEN SOURCE: NORMAL
TROPONIN I SERPL DL<=0.01 NG/ML-MCNC: <0.03 NG/ML
WBC # BLD AUTO: 6.38 K/UL (ref 3.9–12.7)

## 2020-03-14 PROCEDURE — 84484 ASSAY OF TROPONIN QUANT: CPT

## 2020-03-14 PROCEDURE — 93005 ELECTROCARDIOGRAM TRACING: CPT | Performed by: INTERNAL MEDICINE

## 2020-03-14 PROCEDURE — 94761 N-INVAS EAR/PLS OXIMETRY MLT: CPT

## 2020-03-14 PROCEDURE — 83880 ASSAY OF NATRIURETIC PEPTIDE: CPT

## 2020-03-14 PROCEDURE — 25500020 PHARM REV CODE 255: Performed by: EMERGENCY MEDICINE

## 2020-03-14 PROCEDURE — 94640 AIRWAY INHALATION TREATMENT: CPT

## 2020-03-14 PROCEDURE — 83605 ASSAY OF LACTIC ACID: CPT

## 2020-03-14 PROCEDURE — 30000890 LABCORP MISCELLANEOUS TEST

## 2020-03-14 PROCEDURE — 96375 TX/PRO/DX INJ NEW DRUG ADDON: CPT

## 2020-03-14 PROCEDURE — 87502 INFLUENZA DNA AMP PROBE: CPT

## 2020-03-14 PROCEDURE — 83735 ASSAY OF MAGNESIUM: CPT

## 2020-03-14 PROCEDURE — 80053 COMPREHEN METABOLIC PANEL: CPT

## 2020-03-14 PROCEDURE — 63600175 PHARM REV CODE 636 W HCPCS: Performed by: EMERGENCY MEDICINE

## 2020-03-14 PROCEDURE — 85025 COMPLETE CBC W/AUTO DIFF WBC: CPT

## 2020-03-14 PROCEDURE — 99285 EMERGENCY DEPT VISIT HI MDM: CPT | Mod: 25

## 2020-03-14 PROCEDURE — 96374 THER/PROPH/DIAG INJ IV PUSH: CPT

## 2020-03-14 PROCEDURE — 87635 SARS-COV-2 COVID-19 AMP PRB: CPT

## 2020-03-14 PROCEDURE — 84145 PROCALCITONIN (PCT): CPT

## 2020-03-14 PROCEDURE — 25000003 PHARM REV CODE 250: Performed by: EMERGENCY MEDICINE

## 2020-03-14 PROCEDURE — 12000002 HC ACUTE/MED SURGE SEMI-PRIVATE ROOM

## 2020-03-14 PROCEDURE — 25000242 PHARM REV CODE 250 ALT 637 W/ HCPCS: Performed by: EMERGENCY MEDICINE

## 2020-03-14 PROCEDURE — 87040 BLOOD CULTURE FOR BACTERIA: CPT

## 2020-03-14 PROCEDURE — 96361 HYDRATE IV INFUSION ADD-ON: CPT

## 2020-03-14 PROCEDURE — 84100 ASSAY OF PHOSPHORUS: CPT

## 2020-03-14 PROCEDURE — 36415 COLL VENOUS BLD VENIPUNCTURE: CPT

## 2020-03-14 PROCEDURE — 96360 HYDRATION IV INFUSION INIT: CPT | Mod: 59

## 2020-03-14 RX ORDER — LEVOFLOXACIN 5 MG/ML
750 INJECTION, SOLUTION INTRAVENOUS
Status: COMPLETED | OUTPATIENT
Start: 2020-03-14 | End: 2020-03-15

## 2020-03-14 RX ORDER — ACETAMINOPHEN 500 MG
1000 TABLET ORAL
Status: COMPLETED | OUTPATIENT
Start: 2020-03-14 | End: 2020-03-14

## 2020-03-14 RX ORDER — MAGNESIUM SULFATE HEPTAHYDRATE 40 MG/ML
2 INJECTION, SOLUTION INTRAVENOUS ONCE
Status: COMPLETED | OUTPATIENT
Start: 2020-03-14 | End: 2020-03-15

## 2020-03-14 RX ORDER — LEVALBUTEROL 1.25 MG/.5ML
3.75 SOLUTION, CONCENTRATE RESPIRATORY (INHALATION)
Status: COMPLETED | OUTPATIENT
Start: 2020-03-14 | End: 2020-03-14

## 2020-03-14 RX ORDER — VANCOMYCIN HCL IN 5 % DEXTROSE 1G/250ML
2000 PLASTIC BAG, INJECTION (ML) INTRAVENOUS ONCE
Status: COMPLETED | OUTPATIENT
Start: 2020-03-14 | End: 2020-03-15

## 2020-03-14 RX ADMIN — SODIUM CHLORIDE, SODIUM LACTATE, POTASSIUM CHLORIDE, AND CALCIUM CHLORIDE 3810 ML: .6; .31; .03; .02 INJECTION, SOLUTION INTRAVENOUS at 09:03

## 2020-03-14 RX ADMIN — IOHEXOL 100 ML: 350 INJECTION, SOLUTION INTRAVENOUS at 11:03

## 2020-03-14 RX ADMIN — MAGNESIUM SULFATE 2 G: 2 INJECTION INTRAVENOUS at 11:03

## 2020-03-14 RX ADMIN — ACETAMINOPHEN 1000 MG: 500 TABLET, FILM COATED ORAL at 08:03

## 2020-03-14 RX ADMIN — ONDANSETRON 4 MG: 2 INJECTION INTRAMUSCULAR; INTRAVENOUS at 11:03

## 2020-03-14 RX ADMIN — LEVALBUTEROL HYDROCHLORIDE 3.75 MG: 1.25 SOLUTION, CONCENTRATE RESPIRATORY (INHALATION) at 10:03

## 2020-03-14 RX ADMIN — PIPERACILLIN SODIUM AND TAZOBACTAM SODIUM 3.38 G: 3; .375 INJECTION, POWDER, LYOPHILIZED, FOR SOLUTION INTRAVENOUS at 11:03

## 2020-03-15 PROBLEM — A41.9 SEPSIS DUE TO PNEUMONIA: Status: ACTIVE | Noted: 2020-03-14

## 2020-03-15 PROBLEM — F41.1 GAD (GENERALIZED ANXIETY DISORDER): Status: ACTIVE | Noted: 2020-03-15

## 2020-03-15 PROBLEM — G47.00 INSOMNIA: Status: RESOLVED | Noted: 2020-03-14 | Resolved: 2020-03-15

## 2020-03-15 PROBLEM — J18.9 SEPSIS DUE TO PNEUMONIA: Status: ACTIVE | Noted: 2020-03-14

## 2020-03-15 LAB
ALBUMIN SERPL BCP-MCNC: 3 G/DL (ref 3.5–5.2)
ALP SERPL-CCNC: 38 U/L (ref 55–135)
ALT SERPL W/O P-5'-P-CCNC: 22 U/L (ref 10–44)
ANION GAP SERPL CALC-SCNC: 11 MMOL/L (ref 8–16)
ANION GAP SERPL CALC-SCNC: 11 MMOL/L (ref 8–16)
AST SERPL-CCNC: 19 U/L (ref 10–40)
BASOPHILS # BLD AUTO: 0.01 K/UL (ref 0–0.2)
BASOPHILS NFR BLD: 0.2 % (ref 0–1.9)
BILIRUB SERPL-MCNC: 0.8 MG/DL (ref 0.1–1)
BILIRUB UR QL STRIP: NEGATIVE
BUN SERPL-MCNC: 9 MG/DL (ref 6–20)
BUN SERPL-MCNC: 9 MG/DL (ref 6–20)
CALCIUM SERPL-MCNC: 8.3 MG/DL (ref 8.7–10.5)
CALCIUM SERPL-MCNC: 8.3 MG/DL (ref 8.7–10.5)
CHLORIDE SERPL-SCNC: 101 MMOL/L (ref 95–110)
CHLORIDE SERPL-SCNC: 101 MMOL/L (ref 95–110)
CLARITY UR: CLEAR
CO2 SERPL-SCNC: 21 MMOL/L (ref 23–29)
CO2 SERPL-SCNC: 21 MMOL/L (ref 23–29)
COLOR UR: YELLOW
CREAT SERPL-MCNC: 0.8 MG/DL (ref 0.5–1.4)
CREAT SERPL-MCNC: 0.8 MG/DL (ref 0.5–1.4)
DIFFERENTIAL METHOD: ABNORMAL
EOSINOPHIL # BLD AUTO: 0 K/UL (ref 0–0.5)
EOSINOPHIL NFR BLD: 0 % (ref 0–8)
ERYTHROCYTE [DISTWIDTH] IN BLOOD BY AUTOMATED COUNT: 14 % (ref 11.5–14.5)
EST. GFR  (AFRICAN AMERICAN): >60 ML/MIN/1.73 M^2
EST. GFR  (AFRICAN AMERICAN): >60 ML/MIN/1.73 M^2
EST. GFR  (NON AFRICAN AMERICAN): >60 ML/MIN/1.73 M^2
EST. GFR  (NON AFRICAN AMERICAN): >60 ML/MIN/1.73 M^2
ESTIMATED AVG GLUCOSE: 174 MG/DL (ref 68–131)
GLUCOSE SERPL-MCNC: 143 MG/DL (ref 70–110)
GLUCOSE SERPL-MCNC: 163 MG/DL (ref 70–110)
GLUCOSE SERPL-MCNC: 166 MG/DL (ref 70–110)
GLUCOSE SERPL-MCNC: 181 MG/DL (ref 70–110)
GLUCOSE SERPL-MCNC: 230 MG/DL (ref 70–110)
GLUCOSE SERPL-MCNC: 230 MG/DL (ref 70–110)
GLUCOSE UR QL STRIP: ABNORMAL
HBA1C MFR BLD HPLC: 7.7 % (ref 4.5–6.2)
HCT VFR BLD AUTO: 31.8 % (ref 37–48.5)
HGB BLD-MCNC: 9.9 G/DL (ref 12–16)
HGB UR QL STRIP: NEGATIVE
IMM GRANULOCYTES # BLD AUTO: 0.01 K/UL (ref 0–0.04)
IMM GRANULOCYTES NFR BLD AUTO: 0.2 % (ref 0–0.5)
KETONES UR QL STRIP: NEGATIVE
LACTATE SERPL-SCNC: 1.2 MMOL/L (ref 0.5–1.9)
LEUKOCYTE ESTERASE UR QL STRIP: NEGATIVE
LYMPHOCYTES # BLD AUTO: 1.2 K/UL (ref 1–4.8)
LYMPHOCYTES NFR BLD: 27 % (ref 18–48)
MAGNESIUM SERPL-MCNC: 1.3 MG/DL (ref 1.6–2.6)
MCH RBC QN AUTO: 26.4 PG (ref 27–31)
MCHC RBC AUTO-ENTMCNC: 31.1 G/DL (ref 32–36)
MCV RBC AUTO: 85 FL (ref 82–98)
MONOCYTES # BLD AUTO: 0.4 K/UL (ref 0.3–1)
MONOCYTES NFR BLD: 8.6 % (ref 4–15)
NEUTROPHILS # BLD AUTO: 2.9 K/UL (ref 1.8–7.7)
NEUTROPHILS NFR BLD: 64 % (ref 38–73)
NITRITE UR QL STRIP: NEGATIVE
NRBC BLD-RTO: 0 /100 WBC
PH UR STRIP: 7 [PH] (ref 5–8)
PHOSPHATE SERPL-MCNC: 3.4 MG/DL (ref 2.7–4.5)
PLATELET # BLD AUTO: 157 K/UL (ref 150–350)
PMV BLD AUTO: 10.3 FL (ref 9.2–12.9)
POTASSIUM SERPL-SCNC: 3.2 MMOL/L (ref 3.5–5.1)
POTASSIUM SERPL-SCNC: 3.2 MMOL/L (ref 3.5–5.1)
PROT SERPL-MCNC: 6 G/DL (ref 6–8.4)
PROT UR QL STRIP: NEGATIVE
RBC # BLD AUTO: 3.75 M/UL (ref 4–5.4)
SODIUM SERPL-SCNC: 133 MMOL/L (ref 136–145)
SODIUM SERPL-SCNC: 133 MMOL/L (ref 136–145)
SP GR UR STRIP: 1.03 (ref 1–1.03)
URN SPEC COLLECT METH UR: ABNORMAL
UROBILINOGEN UR STRIP-ACNC: NEGATIVE EU/DL
WBC # BLD AUTO: 4.56 K/UL (ref 3.9–12.7)

## 2020-03-15 PROCEDURE — 84100 ASSAY OF PHOSPHORUS: CPT

## 2020-03-15 PROCEDURE — 82962 GLUCOSE BLOOD TEST: CPT

## 2020-03-15 PROCEDURE — 94761 N-INVAS EAR/PLS OXIMETRY MLT: CPT

## 2020-03-15 PROCEDURE — 63600175 PHARM REV CODE 636 W HCPCS: Performed by: INTERNAL MEDICINE

## 2020-03-15 PROCEDURE — 63600175 PHARM REV CODE 636 W HCPCS: Performed by: EMERGENCY MEDICINE

## 2020-03-15 PROCEDURE — 12000002 HC ACUTE/MED SURGE SEMI-PRIVATE ROOM

## 2020-03-15 PROCEDURE — 83605 ASSAY OF LACTIC ACID: CPT

## 2020-03-15 PROCEDURE — 63600175 PHARM REV CODE 636 W HCPCS: Performed by: STUDENT IN AN ORGANIZED HEALTH CARE EDUCATION/TRAINING PROGRAM

## 2020-03-15 PROCEDURE — 99900035 HC TECH TIME PER 15 MIN (STAT)

## 2020-03-15 PROCEDURE — C9399 UNCLASSIFIED DRUGS OR BIOLOG: HCPCS | Performed by: INTERNAL MEDICINE

## 2020-03-15 PROCEDURE — 80053 COMPREHEN METABOLIC PANEL: CPT

## 2020-03-15 PROCEDURE — 83735 ASSAY OF MAGNESIUM: CPT

## 2020-03-15 PROCEDURE — 25000003 PHARM REV CODE 250: Performed by: STUDENT IN AN ORGANIZED HEALTH CARE EDUCATION/TRAINING PROGRAM

## 2020-03-15 PROCEDURE — 36415 COLL VENOUS BLD VENIPUNCTURE: CPT

## 2020-03-15 PROCEDURE — 85025 COMPLETE CBC W/AUTO DIFF WBC: CPT

## 2020-03-15 PROCEDURE — 81003 URINALYSIS AUTO W/O SCOPE: CPT

## 2020-03-15 PROCEDURE — 83036 HEMOGLOBIN GLYCOSYLATED A1C: CPT

## 2020-03-15 RX ORDER — HYDROXYCHLOROQUINE SULFATE 200 MG/1
200 TABLET, FILM COATED ORAL 2 TIMES DAILY
Status: DISCONTINUED | OUTPATIENT
Start: 2020-03-16 | End: 2020-03-25 | Stop reason: HOSPADM

## 2020-03-15 RX ORDER — LEVALBUTEROL 1.25 MG/.5ML
3.75 SOLUTION, CONCENTRATE RESPIRATORY (INHALATION) EVERY 6 HOURS PRN
Status: DISCONTINUED | OUTPATIENT
Start: 2020-03-15 | End: 2020-03-15

## 2020-03-15 RX ORDER — ONDANSETRON 2 MG/ML
4 INJECTION INTRAMUSCULAR; INTRAVENOUS
Status: COMPLETED | OUTPATIENT
Start: 2020-03-15 | End: 2020-03-14

## 2020-03-15 RX ORDER — ENOXAPARIN SODIUM 100 MG/ML
40 INJECTION SUBCUTANEOUS EVERY 12 HOURS
Status: DISCONTINUED | OUTPATIENT
Start: 2020-03-15 | End: 2020-03-25 | Stop reason: HOSPADM

## 2020-03-15 RX ORDER — IBUPROFEN 200 MG
16 TABLET ORAL
Status: DISCONTINUED | OUTPATIENT
Start: 2020-03-15 | End: 2020-03-25 | Stop reason: HOSPADM

## 2020-03-15 RX ORDER — LEVOFLOXACIN 5 MG/ML
500 INJECTION, SOLUTION INTRAVENOUS
Status: DISCONTINUED | OUTPATIENT
Start: 2020-03-16 | End: 2020-03-18

## 2020-03-15 RX ORDER — TALC
6 POWDER (GRAM) TOPICAL NIGHTLY PRN
Status: DISCONTINUED | OUTPATIENT
Start: 2020-03-15 | End: 2020-03-25 | Stop reason: HOSPADM

## 2020-03-15 RX ORDER — GLUCAGON 1 MG
1 KIT INJECTION
Status: DISCONTINUED | OUTPATIENT
Start: 2020-03-15 | End: 2020-03-25 | Stop reason: HOSPADM

## 2020-03-15 RX ORDER — IBUPROFEN 200 MG
24 TABLET ORAL
Status: DISCONTINUED | OUTPATIENT
Start: 2020-03-15 | End: 2020-03-25 | Stop reason: HOSPADM

## 2020-03-15 RX ORDER — LEVALBUTEROL 1.25 MG/.5ML
1.25 SOLUTION, CONCENTRATE RESPIRATORY (INHALATION) EVERY 6 HOURS PRN
Status: DISCONTINUED | OUTPATIENT
Start: 2020-03-15 | End: 2020-03-17

## 2020-03-15 RX ORDER — INSULIN ASPART 100 [IU]/ML
1-10 INJECTION, SOLUTION INTRAVENOUS; SUBCUTANEOUS
Status: DISCONTINUED | OUTPATIENT
Start: 2020-03-15 | End: 2020-03-25 | Stop reason: HOSPADM

## 2020-03-15 RX ORDER — MORPHINE SULFATE 2 MG/ML
2 INJECTION, SOLUTION INTRAMUSCULAR; INTRAVENOUS EVERY 4 HOURS PRN
Status: DISCONTINUED | OUTPATIENT
Start: 2020-03-15 | End: 2020-03-24

## 2020-03-15 RX ORDER — HYDROXYCHLOROQUINE SULFATE 200 MG/1
400 TABLET, FILM COATED ORAL 2 TIMES DAILY
Status: DISCONTINUED | OUTPATIENT
Start: 2020-03-15 | End: 2020-03-15

## 2020-03-15 RX ORDER — INSULIN ASPART 100 [IU]/ML
0-5 INJECTION, SOLUTION INTRAVENOUS; SUBCUTANEOUS
Status: DISCONTINUED | OUTPATIENT
Start: 2020-03-15 | End: 2020-03-15

## 2020-03-15 RX ORDER — IBUPROFEN 400 MG/1
400 TABLET ORAL EVERY 6 HOURS PRN
Status: DISCONTINUED | OUTPATIENT
Start: 2020-03-15 | End: 2020-03-15

## 2020-03-15 RX ORDER — ACETAMINOPHEN 325 MG/1
650 TABLET ORAL EVERY 4 HOURS PRN
Status: DISCONTINUED | OUTPATIENT
Start: 2020-03-15 | End: 2020-03-15

## 2020-03-15 RX ORDER — ESCITALOPRAM OXALATE 10 MG/1
10 TABLET ORAL DAILY
Status: DISCONTINUED | OUTPATIENT
Start: 2020-03-15 | End: 2020-03-25 | Stop reason: HOSPADM

## 2020-03-15 RX ORDER — IBUPROFEN 400 MG/1
400 TABLET ORAL EVERY 6 HOURS PRN
Status: DISCONTINUED | OUTPATIENT
Start: 2020-03-15 | End: 2020-03-16

## 2020-03-15 RX ORDER — SODIUM CHLORIDE 9 MG/ML
INJECTION, SOLUTION INTRAVENOUS CONTINUOUS
Status: DISCONTINUED | OUTPATIENT
Start: 2020-03-15 | End: 2020-03-19

## 2020-03-15 RX ORDER — SODIUM CHLORIDE 0.9 % (FLUSH) 0.9 %
10 SYRINGE (ML) INJECTION
Status: DISCONTINUED | OUTPATIENT
Start: 2020-03-15 | End: 2020-03-25 | Stop reason: HOSPADM

## 2020-03-15 RX ORDER — LORAZEPAM 0.5 MG/1
0.5 TABLET ORAL EVERY 6 HOURS PRN
Status: DISCONTINUED | OUTPATIENT
Start: 2020-03-15 | End: 2020-03-25 | Stop reason: HOSPADM

## 2020-03-15 RX ORDER — ENOXAPARIN SODIUM 100 MG/ML
40 INJECTION SUBCUTANEOUS EVERY 24 HOURS
Status: DISCONTINUED | OUTPATIENT
Start: 2020-03-15 | End: 2020-03-15

## 2020-03-15 RX ORDER — ACETAMINOPHEN 325 MG/1
650 TABLET ORAL EVERY 8 HOURS PRN
Status: DISCONTINUED | OUTPATIENT
Start: 2020-03-15 | End: 2020-03-19

## 2020-03-15 RX ORDER — IPRATROPIUM BROMIDE AND ALBUTEROL SULFATE 2.5; .5 MG/3ML; MG/3ML
3 SOLUTION RESPIRATORY (INHALATION) EVERY 4 HOURS PRN
Status: DISCONTINUED | OUTPATIENT
Start: 2020-03-15 | End: 2020-03-15

## 2020-03-15 RX ORDER — HYDRALAZINE HYDROCHLORIDE 20 MG/ML
10 INJECTION INTRAMUSCULAR; INTRAVENOUS EVERY 8 HOURS PRN
Status: DISCONTINUED | OUTPATIENT
Start: 2020-03-15 | End: 2020-03-25 | Stop reason: HOSPADM

## 2020-03-15 RX ORDER — ONDANSETRON 4 MG/1
8 TABLET, ORALLY DISINTEGRATING ORAL EVERY 8 HOURS PRN
Status: DISCONTINUED | OUTPATIENT
Start: 2020-03-15 | End: 2020-03-25 | Stop reason: HOSPADM

## 2020-03-15 RX ORDER — HYDROXYCHLOROQUINE SULFATE 200 MG/1
400 TABLET, FILM COATED ORAL 2 TIMES DAILY
Status: DISCONTINUED | OUTPATIENT
Start: 2020-03-15 | End: 2020-03-16

## 2020-03-15 RX ADMIN — VANCOMYCIN HYDROCHLORIDE 2000 MG: 1 INJECTION, POWDER, LYOPHILIZED, FOR SOLUTION INTRAVENOUS at 01:03

## 2020-03-15 RX ADMIN — SODIUM CHLORIDE: 0.9 INJECTION, SOLUTION INTRAVENOUS at 03:03

## 2020-03-15 RX ADMIN — PIPERACILLIN AND TAZOBACTAM 3.38 G: 3; .375 INJECTION, POWDER, LYOPHILIZED, FOR SOLUTION INTRAVENOUS; PARENTERAL at 07:03

## 2020-03-15 RX ADMIN — ACETAMINOPHEN 650 MG: 325 TABLET ORAL at 03:03

## 2020-03-15 RX ADMIN — INSULIN DETEMIR 10 UNITS: 100 INJECTION, SOLUTION SUBCUTANEOUS at 03:03

## 2020-03-15 RX ADMIN — MORPHINE SULFATE 2 MG: 2 INJECTION, SOLUTION INTRAMUSCULAR; INTRAVENOUS at 01:03

## 2020-03-15 RX ADMIN — MORPHINE SULFATE 2 MG: 2 INJECTION, SOLUTION INTRAMUSCULAR; INTRAVENOUS at 03:03

## 2020-03-15 RX ADMIN — ESCITALOPRAM OXALATE 10 MG: 10 TABLET ORAL at 08:03

## 2020-03-15 RX ADMIN — LEVOFLOXACIN 750 MG: 5 INJECTION, SOLUTION INTRAVENOUS at 12:03

## 2020-03-15 RX ADMIN — SODIUM CHLORIDE: 0.9 INJECTION, SOLUTION INTRAVENOUS at 08:03

## 2020-03-15 RX ADMIN — ENOXAPARIN SODIUM 40 MG: 100 INJECTION SUBCUTANEOUS at 08:03

## 2020-03-15 RX ADMIN — ACETAMINOPHEN 650 MG: 325 TABLET ORAL at 08:03

## 2020-03-15 RX ADMIN — PIPERACILLIN AND TAZOBACTAM 3.38 G: 3; .375 INJECTION, POWDER, LYOPHILIZED, FOR SOLUTION INTRAVENOUS; PARENTERAL at 03:03

## 2020-03-15 RX ADMIN — ENOXAPARIN SODIUM 40 MG: 100 INJECTION SUBCUTANEOUS at 09:03

## 2020-03-15 NOTE — PROGRESS NOTES
VANCOMYCIN PHARMACOKINETIC NOTE:  Vancomycin Day # 1    Objective/Assessment:    Diagnosis/Indication for Vancomycin: Bacteremia     49 y.o., female; Actual Body Weight = 127 kg (280 lb).    The patient has the following labs:  3/14/2020 Estimated Creatinine Clearance: 105.7 mL/min (based on SCr of 0.9 mg/dL). Lab Results   Component Value Date    BUN 11 03/14/2020       Lab Results   Component Value Date    WBC 6.38 03/14/2020            Plan:  Adjust vancomycin dose and/or frequency based on the patient's actual weight and renal function:  Initiate Vancomycin 2000 mg IV every 12 hours.  Orders have been entered into patient's chart.    Vancomycin dose = 15.7 mg/kg actual body weight    Vancomycin trough level has been ordered for 3/16 @10:00, prior to 4th dose.    Pharmacy will manage vancomycin therapy, monitor serum vancomycin levels, monitor renal function and adjust regimen as necessary.      Thank you for allowing us to participate in this patient's care.     Russell Dasilva 3/14/2020 10:04 PM  Department of Pharmacy  Ext [phone]

## 2020-03-15 NOTE — SUBJECTIVE & OBJECTIVE
Interval History:     Review of Systems   Constitutional: Positive for fatigue and fever.   HENT: Negative.    Eyes: Negative.    Respiratory: Positive for cough and shortness of breath.    Cardiovascular: Negative.    Gastrointestinal: Negative.    Endocrine: Negative.    Genitourinary: Negative.    Musculoskeletal: Negative.    Skin: Negative.    Allergic/Immunologic: Negative.    Neurological: Negative.    Hematological: Negative.    Psychiatric/Behavioral: Negative.      Objective:     Vital Signs (Most Recent):  Temp: (!) 102.1 °F (38.9 °C) (03/15/20 1520)  Pulse: 91 (03/15/20 1500)  Resp: 20 (03/15/20 1500)  BP: 123/71 (03/15/20 1500)  SpO2: 96 % (03/15/20 1500) Vital Signs (24h Range):  Temp:  [98.7 °F (37.1 °C)-102.3 °F (39.1 °C)] 102.1 °F (38.9 °C)  Pulse:  [] 91  Resp:  [20-22] 20  SpO2:  [93 %-98 %] 96 %  BP: (107-150)/(54-75) 123/71     Weight: 133 kg (293 lb 3.4 oz)  Body mass index is 43.3 kg/m².  No intake or output data in the 24 hours ending 03/15/20 1641   Physical Exam   Constitutional: She is oriented to person, place, and time. She appears well-developed and well-nourished.   obese   HENT:   Head: Normocephalic and atraumatic.   Mouth/Throat: Oropharynx is clear and moist.   Eyes: Pupils are equal, round, and reactive to light. Conjunctivae are normal. No scleral icterus.   Neck: Normal range of motion. Neck supple. No thyromegaly present.   Cardiovascular: Normal rate, regular rhythm and normal heart sounds.   No murmur heard.  Pulmonary/Chest: Effort normal. No respiratory distress.   Very difficult exam d/t habitus    Abdominal: Soft. Bowel sounds are normal. She exhibits no distension. There is no tenderness.   Musculoskeletal: Normal range of motion. She exhibits no edema.   Neurological: She is alert and oriented to person, place, and time. No sensory deficit. She exhibits normal muscle tone.   Skin: Skin is warm. Capillary refill takes less than 2 seconds. No rash noted.    Psychiatric: She has a normal mood and affect. Her behavior is normal. Judgment and thought content normal.   Nursing note and vitals reviewed.      Significant Labs:   Blood Culture:   Recent Labs   Lab 03/14/20 2125 03/14/20  2240   LABBLOO No Growth to date No Growth to date     CBC:   Recent Labs   Lab 03/14/20  2111 03/15/20  0430   WBC 6.38 4.56   HGB 11.5* 9.9*   HCT 36.4* 31.8*    157     CMP:   Recent Labs   Lab 03/14/20  2111 03/15/20  0430   * 133*  133*   K 3.6 3.2*  3.2*    101  101   CO2 24 21*  21*   * 230*  230*   BUN 11 9  9   CREATININE 0.9 0.8  0.8   CALCIUM 8.7 8.3*  8.3*   PROT 7.0 6.0   ALBUMIN 3.6 3.0*   BILITOT 0.8 0.8   ALKPHOS 44* 38*   AST 22 19   ALT 29 22   ANIONGAP 9 11  11   EGFRNONAA >60.0 >60.0  >60.0     Lactic Acid:   Recent Labs   Lab 03/14/20  2120 03/15/20  0122   LACTATE 2.1* 1.2     POCT Glucose: No results for input(s): POCTGLUCOSE in the last 48 hours.  All pertinent labs within the past 24 hours have been reviewed.    Significant Imaging: CXR: I have reviewed all pertinent results/findings within the past 24 hours and my personal findings are:  .  I have reviewed all pertinent imaging results/findings within the past 24 hours.

## 2020-03-15 NOTE — HPI
48 yo F with PMH of HTN, DM presenting to ER c/o flu-like symptoms x 5 days. Pt reports subjective fever/chills, cough,, fatigue, generalized muscle aches, sob .  Patient states that symptoms started Tuesday with cough and mild shortness of breath.  She states that Wednesday she felt generalized fatigue and slept for much of the day.  On Thursday if symptoms worsen she went to urgent care where she was tested negative for influenza a and was diagnosed with atypical pneumonia and started on azithromycin.  She received steroid injection at urgent care.  Patient states that she had worsening shortness of breath and fevers today which prompted her to come to ER.  Patient denies any known sick contact or COVID-19 contacts.  She works at [employer] in the [employer] and is in contact with many people.    In ED:  Temperature 101.3°, tachycardic to 115, respiratory rate 20.  LA 2.1, WBC 6.38, lymphocyte 1.1.  Influenza A/B negative, PCT wnl.  CTA chest no evidence of PE however patchy ground-glass opacities with patchy consolidation in bilateral lower lobes.  sepsis protocol/EGDT started in the ED with broad-spectrum antibiotics levofloxacin, Zosyn and vancomycin and IVF: 30 cc/kg.  COVID swab obtained and sent to [facility].

## 2020-03-15 NOTE — PROGRESS NOTES
Critical access hospital Medicine  Progress Note    Patient Name: Patria Bennett  MRN: [id number]  Patient Class: IP- Inpatient   Admission Date: 3/14/2020  Length of Stay: 1 days  Attending Physician: Emmie Figueroa DO  Primary Care Provider: DINAH Leos        Subjective:     Principal Problem:Sepsis due to pneumonia        HPI:  50 yo F with PMH of HTN, DM presenting to ER c/o flu-like symptoms x 5 days. Pt reports subjective fever/chills, cough,, fatigue, generalized muscle aches, sob .  Patient states that symptoms started Tuesday with cough and mild shortness of breath.  She states that Wednesday she felt generalized fatigue and slept for much of the day.  On Thursday if symptoms worsen she went to urgent care where she was tested negative for influenza a and was diagnosed with atypical pneumonia and started on azithromycin.  She received steroid injection at urgent care.  Patient states that she had worsening shortness of breath and fevers today which prompted her to come to ER.  Patient denies any known sick contact or COVID-19 contacts.  She works at [employer] in the [employer] and is in contact with many people.    In ED:  Temperature 101.3°, tachycardic to 115, respiratory rate 20.  LA 2.1, WBC 6.38, lymphocyte 1.1.  Influenza A/B negative, PCT wnl.  CTA chest no evidence of PE however patchy ground-glass opacities with patchy consolidation in bilateral lower lobes.  sepsis protocol/EGDT started in the ED with broad-spectrum antibiotics levofloxacin, Zosyn and vancomycin and IVF: 30 cc/kg.  COVID swab obtained and sent to lab [facility].       Overview/Hospital Course:  -3/15/20-Stable today yet SOB with little movement. Continue to have fever. Labs stable    Interval History:     Review of Systems   Constitutional: Positive for fatigue and fever.   HENT: Negative.    Eyes: Negative.    Respiratory: Positive for cough and shortness of breath.    Cardiovascular: Negative.     Gastrointestinal: Negative.    Endocrine: Negative.    Genitourinary: Negative.    Musculoskeletal: Negative.    Skin: Negative.    Allergic/Immunologic: Negative.    Neurological: Negative.    Hematological: Negative.    Psychiatric/Behavioral: Negative.      Objective:     Vital Signs (Most Recent):  Temp: (!) 102.1 °F (38.9 °C) (03/15/20 1520)  Pulse: 91 (03/15/20 1500)  Resp: 20 (03/15/20 1500)  BP: 123/71 (03/15/20 1500)  SpO2: 96 % (03/15/20 1500) Vital Signs (24h Range):  Temp:  [98.7 °F (37.1 °C)-102.3 °F (39.1 °C)] 102.1 °F (38.9 °C)  Pulse:  [] 91  Resp:  [20-22] 20  SpO2:  [93 %-98 %] 96 %  BP: (107-150)/(54-75) 123/71     Weight: 133 kg (293 lb 3.4 oz)  Body mass index is 43.3 kg/m².  No intake or output data in the 24 hours ending 03/15/20 1641   Physical Exam   Constitutional: She is oriented to person, place, and time. She appears well-developed and well-nourished.   obese   HENT:   Head: Normocephalic and atraumatic.   Mouth/Throat: Oropharynx is clear and moist.   Eyes: Pupils are equal, round, and reactive to light. Conjunctivae are normal. No scleral icterus.   Neck: Normal range of motion. Neck supple. No thyromegaly present.   Cardiovascular: Normal rate, regular rhythm and normal heart sounds.   No murmur heard.  Pulmonary/Chest: Effort normal. No respiratory distress.   Very difficult exam d/t habitus    Abdominal: Soft. Bowel sounds are normal. She exhibits no distension. There is no tenderness.   Musculoskeletal: Normal range of motion. She exhibits no edema.   Neurological: She is alert and oriented to person, place, and time. No sensory deficit. She exhibits normal muscle tone.   Skin: Skin is warm. Capillary refill takes less than 2 seconds. No rash noted.   Psychiatric: She has a normal mood and affect. Her behavior is normal. Judgment and thought content normal.   Nursing note and vitals reviewed.      Significant Labs:   Blood Culture:   Recent Labs   Lab 03/14/20  1250  03/14/20  2240   LABBLOO No Growth to date No Growth to date     CBC:   Recent Labs   Lab 03/14/20  2111 03/15/20  0430   WBC 6.38 4.56   HGB 11.5* 9.9*   HCT 36.4* 31.8*    157     CMP:   Recent Labs   Lab 03/14/20  2111 03/15/20  0430   * 133*  133*   K 3.6 3.2*  3.2*    101  101   CO2 24 21*  21*   * 230*  230*   BUN 11 9  9   CREATININE 0.9 0.8  0.8   CALCIUM 8.7 8.3*  8.3*   PROT 7.0 6.0   ALBUMIN 3.6 3.0*   BILITOT 0.8 0.8   ALKPHOS 44* 38*   AST 22 19   ALT 29 22   ANIONGAP 9 11  11   EGFRNONAA >60.0 >60.0  >60.0     Lactic Acid:   Recent Labs   Lab 03/14/20  2120 03/15/20  0122   LACTATE 2.1* 1.2     POCT Glucose: No results for input(s): POCTGLUCOSE in the last 48 hours.  All pertinent labs within the past 24 hours have been reviewed.    Significant Imaging: CXR: I have reviewed all pertinent results/findings within the past 24 hours and my personal findings are:  .  I have reviewed all pertinent imaging results/findings within the past 24 hours.      Assessment/Plan:      * Sepsis due to pneumonia  As above      BRANDY (generalized anxiety disorder)  Stable  Chronic medical condition  Continuing home medication          Type 2 diabetes mellitus without complication  Chronic medical condition  Holding home medications: actos, metformin, trulicity- stable  Low dose SSI  accuchecks achs            Essential hypertension  Chronic medical condition  Holding home antihypertensives, valsartan/hydrochlorothiazide 140/25 in setting of sepsis  P.r.n. Hydralazine for SBP greater than 180  Monitoring      Gastroesophageal reflux disease  Aware  Protonix 40 mg q day ordered      Pneumonia due to infectious organism  Admit to med/surg  On admission: SIRS (+3/4): , RR 20 ,Temp 101.3) and QSOFA (+2/3): HR, RR  Source: PNA  LA 2.1  Will trend until LA within normal limits. Decreased to 1.2  Procal negative and Influenza A/b negative   Imaging: CTA: patchy groundglass opacity with  patchy consolidation in bilateral lower lobes  IVABx started in ED: Vanc, Zosyn, Levofloxacin  IVF: 30cc/kg bolus and will continue with NS @ 125 cc/kg  Blood (NGTD), urine (contaminated), resp cx (pending)  COVID-19 swab sent out to [facility], pending      Rule out Covid-19  Risk: LOW RISK (negative travel or known covid + contacts. Not a healthcare worker and not in high exposure setting like ltac or correctional facility)   Labs: influenza negative, PCT wnl, ALC 1058 cells/mm2  COVID swab sent to [facility]  Imaging: groundglass opacitys on cta  Supportive care: supplemental O2 prn, tylenol/motrin for fever reduction/myalgia, xopenex, IVF  Patient received dexamethasone injection 8 mg at urgent care on 03/12:  AVOID Steroids    Consult ID                  VTE Risk Mitigation (From admission, onward)         Ordered     enoxaparin injection 40 mg  Every 12 hours      03/15/20 0252     IP VTE HIGH RISK PATIENT  Once      03/15/20 0244                Patient discussed with Dr. Hollis Ardon NP  Department of Hospital Medicine   formerly [facility]

## 2020-03-15 NOTE — HOSPITAL COURSE
Patient admitted with Sepsis/Acute Hypoxic Resp Failure due to COVID19  She was treated adequately while she was in the hospital and was later Discharged to Home with Home O2

## 2020-03-15 NOTE — ASSESSMENT & PLAN NOTE
Chronic medical condition  Holding home medications: actos, metformin, trulicity  Low dose SSI  accuchecks andrae

## 2020-03-15 NOTE — PROGRESS NOTES
Therapy with Vancomycin complete and / or consult / order discontinued by Dr. Wong on 3/15   Pharmacy will sign off, please re-consult as needed.  Thank you for allowing us to participate in this patient's care.  Melissa Chacko 3/15/2020 4:31 PM  Dept of Pharmacy  Ext [phone]

## 2020-03-15 NOTE — ED PROVIDER NOTES
Encounter Date: 3/14/2020       History     Chief Complaint   Patient presents with    Shortness of Breath     cough x 4 days was seen at [facility] 2 days ago dx with pneumonia pt reports symptoms are getting worse    Headache     49-year-old female with history of non-insulin-dependent diabetes, hypertension, gastroesophageal reflux.  Patient presents to the emergency department with complaint with complaint of worsening shortness of breath, nonproductive cough and fever over the last 5 days.  Patient states her symptoms began on Tuesday at that time she had cough and flu-like symptoms.  Patient was tested at the [employer] which she works in [place] and at that time she was diagnosed fluid negative.  Patient was treated for cough with Zithromax.  Patient states his symptoms have only worsened over last 2-3 days.  Upon arrival patient found with temperature 101.6.  Patient denies ill contacts.  Denies vomiting, diarrhea, no other constitutional symptoms.        Review of patient's allergies indicates:  No Known Allergies  Past Medical History:   Diagnosis Date    Acid reflux     Diabetes mellitus type I     Hypertension      Past Surgical History:   Procedure Laterality Date    REDUCTION OF BOTH BREASTS      TONSILLECTOMY       No family history on file.  Social History     Tobacco Use    Smoking status: Never Smoker    Smokeless tobacco: Never Used   Substance Use Topics    Alcohol use: Not on file    Drug use: Not on file     Review of Systems   Constitutional: Positive for chills and fever.   HENT: Negative for rhinorrhea and sore throat.    Respiratory: Positive for cough and shortness of breath.    Cardiovascular: Negative for chest pain.   Gastrointestinal: Negative for nausea and vomiting.   Genitourinary: Negative for dysuria and frequency.   Musculoskeletal: Negative for back pain and myalgias.   Skin: Negative for rash.   Neurological: Negative for weakness and light-headedness.    Hematological: Does not bruise/bleed easily.       Physical Exam     Initial Vitals [03/14/20 1943]   BP Pulse Resp Temp SpO2   (!) 150/75 (!) 115 20 (!) 101.3 °F (38.5 °C) 98 %      MAP       --         Physical Exam    Nursing note and vitals reviewed.  Constitutional: She appears well-developed and well-nourished.   Patient appears ill, with conversational dyspnea.   HENT:   Head: Normocephalic and atraumatic.   Nose: Nose normal.   Mouth/Throat: Oropharynx is clear and moist.   Eyes: Conjunctivae and EOM are normal. Pupils are equal, round, and reactive to light.   Neck: Normal range of motion. Neck supple. No thyromegaly present. No tracheal deviation present.   Cardiovascular: Normal rate, regular rhythm, normal heart sounds and intact distal pulses. Exam reveals no gallop and no friction rub.    No murmur heard.  Pulmonary/Chest: Breath sounds normal. No stridor. No respiratory distress.   Course bilateral breath sounds no adventitious sounds, scant wheezing noted occasionally.   Abdominal: Soft. Bowel sounds are normal. She exhibits no mass. There is no rebound and no guarding.   Musculoskeletal: Normal range of motion. She exhibits no edema.   Lymphadenopathy:     She has no cervical adenopathy.   Neurological: She is alert and oriented to person, place, and time. She has normal strength and normal reflexes. GCS score is 15. GCS eye subscore is 4. GCS verbal subscore is 5. GCS motor subscore is 6.   Skin: Skin is warm and dry. Capillary refill takes less than 2 seconds.   Psychiatric: She has a normal mood and affect.         ED Course   Procedures  Labs Reviewed   CBC W/ AUTO DIFFERENTIAL - Abnormal; Notable for the following components:       Result Value    Hemoglobin 11.5 (*)     Hematocrit 36.4 (*)     Mean Corpuscular Hemoglobin 26.7 (*)     Mean Corpuscular Hemoglobin Conc 31.6 (*)     Gran% 76.1 (*)     Lymph% 16.8 (*)     All other components within normal limits   COMPREHENSIVE METABOLIC PANEL -  Abnormal; Notable for the following components:    Sodium 134 (*)     Glucose 224 (*)     Alkaline Phosphatase 44 (*)     All other components within normal limits   LACTIC ACID, PLASMA - Abnormal; Notable for the following components:    Lactate (Lactic Acid) 2.1 (*)     All other components within normal limits    Narrative:     lactic acid  critical result(s) repeated. Called and verbal readback   obtained from juan valadez rn er  by JYOTI 03/14/2020 21:55   MAGNESIUM - Abnormal; Notable for the following components:    Magnesium 1.3 (*)     All other components within normal limits   PHOSPHORUS - Abnormal; Notable for the following components:    Phosphorus 2.3 (*)     All other components within normal limits   CULTURE, BLOOD   CULTURE, BLOOD   INFLUENZA A AND B ANTIGEN    Narrative:     Specimen Source->Nasopharyngeal Swab   B-TYPE NATRIURETIC PEPTIDE   TROPONIN I   URINALYSIS, REFLEX TO URINE CULTURE   PROCALCITONIN   PROCALCITONIN   LACTIC ACID, PLASMA   MISCELLANEOUS SENDOUT TEST, NON-BLOOD          Imaging Results          CTA Chest Non-Coronary (PE Study) (No Result on File)                X-Ray Chest AP Portable (In process)                  Medical Decision Making:   Initial Assessment:   49-year-old female with history of hypertension, non-insulin-dependent diabetes here with 5 day history of worsening cough, shortness of breath, fever.  Patient had negative flu test and has been taking Zithromax since Thursday without improvement.  Differential Diagnosis:   Pneumonia, influenza, viral pneumonia, corona virus (covid - 19),    Clinical Tests:   Lab Tests: Ordered and Reviewed  Radiological Study: Ordered and Reviewed  Medical Tests: Ordered and Reviewed  Sepsis Perfusion Assessment: I attest, a sepsis perfusion exam was performed within 6 hours of Septic Shock presentation, following fluid resuscitation.  ED Management:  Patient seen evaluated emergency department patient with acute dyspnea in setting of  cough fever over the last 5 days.  Patient diagnosed as having pneumonia as outpatient placed on Zithromax and states her symptoms are getting worse today.  At this time patient is influenza negative.  Has lactic acid 2.1.  White count of 6.3.  With lymphocyte count 1.1.  At this time chest x-ray with atypical interstitial type pattern noted.  Patient currently awaiting CT a chest secondary to tachycardia.  At this time due to patient's overall respiratory distress patient will be admitted for pneumonia, fever, respiratory distress.  Patient also will be tested for corona virus.                                 Clinical Impression:       ICD-10-CM ICD-9-CM   1. Pneumonia due to infectious organism, unspecified laterality, unspecified part of lung J18.9 136.9     484.8   2. Cough with fever R05 786.2    R50.9 780.60             ED Disposition Condition    Admit                           Georgi Galan MD  03/14/20 2265

## 2020-03-15 NOTE — ASSESSMENT & PLAN NOTE
Chronic medical condition  Holding home antihypertensives, valsartan/hydrochlorothiazide 140/25 in setting of sepsis  P.r.n. Hydralazine for SBP greater than 180  Monitoring

## 2020-03-15 NOTE — PLAN OF CARE
03/15/20 0844   Patient Assessment/Suction   Level of Consciousness (AVPU) alert   Respiratory Effort Normal;Unlabored   Expansion/Accessory Muscles/Retractions no use of accessory muscles   All Lung Fields Breath Sounds coarse   Cough Frequency infrequent   PRE-TX-O2   O2 Device (Oxygen Therapy) nasal cannula   Flow (L/min) 2   SpO2 95 %   Pulse Oximetry Type Intermittent   $ Pulse Oximetry - Multiple Charge Pulse Oximetry - Multiple   Resp 20   Aerosol Therapy   $ Aerosol Therapy Charges PRN treatment not required   Daily Review of Necessity (SVN) completed   Respiratory Treatment Status (SVN) PRN treatment not required

## 2020-03-15 NOTE — ASSESSMENT & PLAN NOTE
Admit to med/surg  On admission: SIRS (+3/4): , RR 20 ,Temp 101.3) and QSOFA (+2/3): HR, RR  Source: PNA  LA 2.1  Will trend until LA within normal limits  Procal negative and Influenza A/b negative   Imaging: CTA: patchy groundglass opacity with patchy consolidation in bilateral lower lobes  IVABx started in ED: Vanc, Zosyn, Levofloxacin  IVF: 30cc/kg bolus and will continue with NS @ 125 cc/kg  Blood, urine, resp cx pending  COVID-19 swab sent out to [facility]      Rule out Covid-19  Risk: LOW RISK (negative travel or known covid + contacts. 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

## 2020-03-15 NOTE — H&P
[facility]  History & Physical    Patient Name: Patria Bennett  MRN: [id number]  Admission Date: 3/14/2020  Attending Physician: Georgi Galan MD   Primary Care Provider: DINAH Leos         Patient information was obtained from patient, past medical records and ER records.     Subjective:     Principal Problem:sepsis 2/2 pna and rule out covid  Chief Complaint:   Chief Complaint   Patient presents with    Shortness of Breath     cough x 4 days was seen at [facility] 2 days ago dx with pneumonia pt reports symptoms are getting worse    Headache        HPI: 48 yo F with PMH of HTN, DM presenting to ER c/o flu-like symptoms x 5 days. Pt reports subjective fever/chills, cough,, fatigue, generalized muscle aches, sob .  Patient states that symptoms started Tuesday with cough and mild shortness of breath.  She states that Wednesday she felt generalized fatigue and slept for much of the day.  On Thursday if symptoms worsen she went to urgent care where she was tested negative for influenza a and was diagnosed with atypical pneumonia and started on azithromycin.  She received steroid injection at urgent care.  Patient states that she had worsening shortness of breath and fevers today which prompted her to come to ER.  Patient denies any known sick contact or COVID-19 contacts.  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 No other mass gatherings.     In ED:  Temperature 101.3°, tachycardic to 115, respiratory rate 20.  LA 2.1, WBC 6.38, lymphocyte 1.1.  Influenza A/B negative, PCT wnl.  CTA chest no evidence of PE however patchy ground-glass opacities with patchy consolidation in bilateral lower lobes.  sepsis protocol/EGDT started in the ED with broad-spectrum antibiotics levofloxacin, Zosyn and vancomycin and IVF: 30 cc/kg.      Case d/w ER Physician Dr. Galan in person. He states that COVID swab obtained and sent to  [facility].     Past Medical History:   Diagnosis Date    Acid reflux     Diabetes mellitus type I     Hypertension  Anxiety        Past Surgical History:   Procedure Laterality Date    REDUCTION OF BOTH BREASTS      TONSILLECTOMY         Review of patient's allergies indicates:  No Known Allergies    Current Facility-Administered Medications on File Prior to Encounter   Medication    [DISCONTINUED] dexamethasone injection 8 mg     Current Outpatient Medications on File Prior to Encounter   Medication Sig    dulaglutide (TRULICITY) 0.75 mg/0.5 mL PnIj Inject 0.75 mg into the skin every 7 days.    metFORMIN (GLUCOPHAGE) 1000 MG tablet Take 1,000 mg by mouth 2 (two) times daily with meals.    omeprazole (PRILOSEC) 20 MG capsule Take 20 mg by mouth once daily.    valsartan-hydrochlorothiazide (DIOVAN-HCT) 160-25 mg per tablet Take 160 tablets by mouth once daily.    [DISCONTINUED] albuterol (PROVENTIL HFA) 90 mcg/actuation inhaler Inhale 2 puffs into the lungs every 6 (six) hours as needed for Wheezing. Rescue    [DISCONTINUED] azithromycin (ZITHROMAX) 250 MG tablet Take 2 tablets (500 mg) on  Day 1,  followed by 1 tablet (250 mg) once daily on Days 2 through 5. (Patient taking differently: Take 250 mg by mouth once daily. Take 2 tablets (500 mg) on  Day 1,  followed by 1 tablet (250 mg) once daily on Days 2 through 5.)    ferrous sulfate (FEOSOL) 325 mg (65 mg iron) Tab tablet Take 325 mg by mouth once daily.     GLYXAMBI 10-5 mg Tab Take 10 mg by mouth once daily.    pioglitazone (ACTOS) 30 MG tablet Take 30 mg by mouth once daily.     Family History     Reviewed and noncontributory        Tobacco Use    Smoking status: Never Smoker    Smokeless tobacco: Never Used   Substance and Sexual Activity    Alcohol use: Never     Frequency: Never    Drug use: Never    Sexual activity: Not on file     Review of Systems   Constitutional: Positive for chills, fatigue and fever.   HENT: Positive for rhinorrhea.  Negative for congestion, sinus pressure and sore throat.    Eyes: Negative for photophobia and visual disturbance.   Respiratory: Positive for cough (non productive) and shortness of breath. Negative for chest tightness and wheezing.    Cardiovascular: Negative for chest pain, palpitations and leg swelling.   Gastrointestinal: Positive for nausea. Negative for abdominal pain, constipation, diarrhea and vomiting.   Genitourinary: Negative for dysuria and hematuria.   Musculoskeletal: Positive for myalgias. Negative for arthralgias.   Skin: Negative for rash.   Neurological: Positive for headaches. Negative for dizziness and weakness.   Psychiatric/Behavioral: Negative for behavioral problems. The patient is not nervous/anxious.      Objective:     Vital Signs (Most Recent):  Temp: (!) 101.3 °F (38.5 °C) (03/14/20 1943)  Pulse: 91 (03/14/20 2233)  Resp: 20 (03/14/20 2233)  BP: (!) 113/56 (03/14/20 2230)  SpO2: 97 % (03/14/20 2233) Vital Signs (24h Range):  Temp:  [101.3 °F (38.5 °C)] 101.3 °F (38.5 °C)  Pulse:  [] 91  Resp:  [20] 20  SpO2:  [93 %-98 %] 97 %  BP: (113-150)/(56-75) 113/56     Weight: 127 kg (280 lb)  Body mass index is 41.35 kg/m².    Physical Exam   Constitutional: She is oriented to person, place, and time.   Fatigued and ill appearing   HENT:   Head: Normocephalic and atraumatic.   Mouth/Throat: Oropharynx is clear and moist. No oropharyngeal exudate.   Eyes: Pupils are equal, round, and reactive to light. Conjunctivae and EOM are normal.   Neck: Normal range of motion. Neck supple. No JVD present. No thyromegaly present.   Cardiovascular: Normal rate, regular rhythm and normal heart sounds. Exam reveals no gallop and no friction rub.   No murmur heard.  Pulmonary/Chest: Effort normal. She has wheezes. She has no rales.   Course breath sounds throughout   Abdominal: Soft. Bowel sounds are normal. She exhibits no distension. There is no tenderness. There is no rebound.   Genitourinary:    Genitourinary Comments: No hillman   Musculoskeletal: Normal range of motion.   Neurological: She is alert and oriented to person, place, and time. She has normal reflexes.   Skin: Skin is warm and dry. She is not diaphoretic.   Psychiatric: She has a normal mood and affect. Her behavior is normal.   Nursing note and vitals reviewed.       Significant Labs:   CBC:   Recent Labs   Lab 03/14/20 2111   WBC 6.38   HGB 11.5*   HCT 36.4*        CMP:   Recent Labs   Lab 03/14/20 2111   *   K 3.6      CO2 24   *   BUN 11   CREATININE 0.9   CALCIUM 8.7   PROT 7.0   ALBUMIN 3.6   BILITOT 0.8   ALKPHOS 44*   AST 22   ALT 29   ANIONGAP 9   EGFRNONAA >60.0     Cardiac Markers:   Recent Labs   Lab 03/14/20 2111   BNP 19     Lactic Acid:   Recent Labs   Lab 03/14/20 2120   LACTATE 2.1*     All pertinent labs within the past 24 hours have been reviewed.    Significant Imaging: I have reviewed all pertinent imaging results/findings within the past 24 hours.   Imaging Results          CTA Chest Non-Coronary (PE Study) (Final result)  Result time 03/14/20 23:44:14    Final result by Winston Kurtz MD (03/14/20 23:44:14)                 Narrative:        Exam: CTA OF THE CHEST WITH CONTRAST    Clinical data: Chest pain, acute, PE suspected, high pretest prob.    Technique: Axial CT angiography images through the lungs were acquired with  contrast and imaged using soft tissue and lung algorithms. Coronal, sagittal,  and 3D volume reconstructions were performed. Reformatted/3D-MPR images were  performed.    Prior studies: Radiograph of the chest dated 03/14/2020, images only.    Findings:    Lungs: No pulmonary mass identified.  Bilateral minimal pleural effusions with  passive collapse bilateral lower lobes.  Area of groundglass opacity with patchy  consolidation in bilateral lower lobes. No pneumothorax. The airway is clear.    Soft Tissues: Subcentimeter-sized bilateral paratracheal, prevascular  and  subcarinal lymph nodes.  No other significant mediastinal, axillary or  supraclavicular adenopathy is identified.  Diffusely bulky left lobe of thyroid.    Vascular: No filling defect within the pulmonary arteries to the segmental  branch level.  Mildly prominent pulmonary artery and its branches. Unremarkable  aorta. Grossly unremarkable sized heart.  No definite abnormality seen on 3D  reformatted images.    Bony structures: No acute or destructive abnormality.    Upper Abdomen: Limited visualization of the solid upper abdominal organs is  grossly unremarkable.    IMPRESSION:    1.  Mildly prominent pulmonary artery and its branches however no evidence of  pulmonary embolism.  2. Bilateral minimal pleural effusions with passive collapse bilateral lower  lobes.  3. Area of patchy groundglass opacity with patchy consolidation in bilateral  lower lobes worrisome for edema versus pneumonia. Followup after treatment  recommended to ensure resolution and exclude any pulmonary nodules. .  4. Subcentimeter-sized bilateral paratracheal, prevascular and subcarinal lymph  nodes.    Recommendation:  Follow up as clinically indicated.    All CT scans at this facility utilize dose modulation, iterative reconstruction,  and/or weight based dosing when appropriate to reduce radiation dose to as low  as reasonably achievable.      Electronically Signed by ASHIA KURTZ MD at 3/15/2020 12:27:57 AM EST                             X-Ray Chest AP Portable (Final result)  Result time 03/15/20 00:23:09    Final result by Ashia Kurtz MD (03/15/20 00:23:09)                 Narrative:        Exam: X-RAY OF THE CHEST    Clinical data: Sepsis.    Technique: Single view of the chest.    Prior studies: No prior studies submitted.    Findings: Mild rightward deviated trachea. Bilateral trace pleural effusions and  lower lobe infiltrates. Cardiac silhouette is within normal limits.  No acute  osseous abnormality is  detected.    IMPRESSION:  Bilateral trace pleural effusions and lower lobe infiltrates.      Recommendation:  Follow up as clinically indicated.      Electronically Signed by ASHIA SON MD at 3/15/2020 1:23:09 AM EST                            EKG independently reviewed.  My interpretation sinus tachycardia , no st segment elevations/depressions    Assessment/Plan:       Pneumonia due to infectious organism  Admit to med/surg  On admission: SIRS (+3/4): , RR 20 ,Temp 101.3) and QSOFA (+2/3): HR, RR  Possible Source: PNA  LA 2.1  Will trend until LA within normal limits  Procal negative and Influenza A/b negative   Imaging: CTA: patchy groundglass opacity with patchy consolidation in bilateral lower lobes  IVABx started in ED: Vanc, Zosyn, Levofloxacin  IVF: 30cc/kg bolus and will continue with NS @ 125 cc/kg  Blood cx  And UA pending  COVID-19 swab sent out to [facility]      Rule out Covid-19  Risk: LOW RISK (negative travel or known covid + contacts. Not a healthcare worker and not in high exposure setting like ltac or correctional facility)    Possible exposure: community:  works at [employer]  Labs: influenza negative, PCT wnl, ALC 1058 cells/mm2  Imaging: groundglass opacitys on cta  COVID swab sent to [facility]  Droplet isolation   Supportive care: supplemental O2 prn, tylenol/motrin for fever reduction/myalgia, xopenex, IVF  Patient received dexamethasone injection 8 mg at urgent care on 03/12:  AVOID Steroids to prevent prolonged viral shedding.   Consult ID      BRANDY (generalized anxiety disorder)  Stable  Chronic medical condition  Continuing home medication      Type 2 diabetes mellitus without complication  Chronic medical condition  Holding home medications: actos, metformin, trulicity  Low dose SSI  accuchecks achs  Diabetic diet    Essential hypertension  Chronic medical condition  Holding home antihypertensives, valsartan/hydrochlorothiazide 140/25 in setting of  sepsis  P.r.n. Hydralazine for SBP greater than 180  Monitoring      Gastroesophageal reflux disease  Aware  Protonix 40 mg q day ordered      VTE Risk Mitigation (From admission, onward)    elana Figueroa DO  Department of Hospital Medicine   [facility]

## 2020-03-15 NOTE — SUBJECTIVE & OBJECTIVE
Past Medical History:   Diagnosis Date    Acid reflux     Diabetes mellitus type I     Hypertension  Anxiety        Past Surgical History:   Procedure Laterality Date    REDUCTION OF BOTH BREASTS      TONSILLECTOMY         Review of patient's allergies indicates:  No Known Allergies    Current Facility-Administered Medications on File Prior to Encounter   Medication    [DISCONTINUED] dexamethasone injection 8 mg     Current Outpatient Medications on File Prior to Encounter   Medication Sig    dulaglutide (TRULICITY) 0.75 mg/0.5 mL PnIj Inject 0.75 mg into the skin every 7 days.    metFORMIN (GLUCOPHAGE) 1000 MG tablet Take 1,000 mg by mouth 2 (two) times daily with meals.    omeprazole (PRILOSEC) 20 MG capsule Take 20 mg by mouth once daily.    valsartan-hydrochlorothiazide (DIOVAN-HCT) 160-25 mg per tablet Take 160 tablets by mouth once daily.    [DISCONTINUED] albuterol (PROVENTIL HFA) 90 mcg/actuation inhaler Inhale 2 puffs into the lungs every 6 (six) hours as needed for Wheezing. Rescue    [DISCONTINUED] azithromycin (ZITHROMAX) 250 MG tablet Take 2 tablets (500 mg) on  Day 1,  followed by 1 tablet (250 mg) once daily on Days 2 through 5. (Patient taking differently: Take 250 mg by mouth once daily. Take 2 tablets (500 mg) on  Day 1,  followed by 1 tablet (250 mg) once daily on Days 2 through 5.)    ferrous sulfate (FEOSOL) 325 mg (65 mg iron) Tab tablet Take 325 mg by mouth once daily.     GLYXAMBI 10-5 mg Tab Take 10 mg by mouth once daily.    pioglitazone (ACTOS) 30 MG tablet Take 30 mg by mouth once daily.     Family History     Reviewed and noncontributory        Tobacco Use    Smoking status: Never Smoker    Smokeless tobacco: Never Used   Substance and Sexual Activity    Alcohol use: Never     Frequency: Never    Drug use: Never    Sexual activity: Not on file     Review of Systems   Constitutional: Positive for chills, fatigue and fever.   HENT: Positive for rhinorrhea. Negative for  congestion, sinus pressure and sore throat.    Eyes: Negative for photophobia and visual disturbance.   Respiratory: Positive for cough (non productive) and shortness of breath. Negative for chest tightness and wheezing.    Cardiovascular: Negative for chest pain, palpitations and leg swelling.   Gastrointestinal: Positive for nausea. Negative for abdominal pain, constipation, diarrhea and vomiting.   Genitourinary: Negative for dysuria and hematuria.   Musculoskeletal: Positive for myalgias. Negative for arthralgias.   Skin: Negative for rash.   Neurological: Positive for headaches. Negative for dizziness and weakness.   Psychiatric/Behavioral: Negative for behavioral problems. The patient is not nervous/anxious.      Objective:     Vital Signs (Most Recent):  Temp: (!) 101.3 °F (38.5 °C) (03/14/20 1943)  Pulse: 91 (03/14/20 2233)  Resp: 20 (03/14/20 2233)  BP: (!) 113/56 (03/14/20 2230)  SpO2: 97 % (03/14/20 2233) Vital Signs (24h Range):  Temp:  [101.3 °F (38.5 °C)] 101.3 °F (38.5 °C)  Pulse:  [] 91  Resp:  [20] 20  SpO2:  [93 %-98 %] 97 %  BP: (113-150)/(56-75) 113/56     Weight: 127 kg (280 lb)  Body mass index is 41.35 kg/m².    Physical Exam   Constitutional: She is oriented to person, place, and time.   Fatigued and ill appearing   HENT:   Head: Normocephalic and atraumatic.   Mouth/Throat: Oropharynx is clear and moist. No oropharyngeal exudate.   Eyes: Pupils are equal, round, and reactive to light. Conjunctivae and EOM are normal.   Neck: Normal range of motion. Neck supple. No JVD present. No thyromegaly present.   Cardiovascular: Normal rate, regular rhythm and normal heart sounds. Exam reveals no gallop and no friction rub.   No murmur heard.  Pulmonary/Chest: Effort normal. She has wheezes. She has no rales.   Course breath sounds throughout   Abdominal: Soft. Bowel sounds are normal. She exhibits no distension. There is no tenderness. There is no rebound.   Genitourinary:   Genitourinary  Comments: No hillman   Musculoskeletal: Normal range of motion.   Neurological: She is alert and oriented to person, place, and time. She has normal reflexes.   Skin: Skin is warm and dry. She is not diaphoretic.   Psychiatric: She has a normal mood and affect. Her behavior is normal.   Nursing note and vitals reviewed.       Significant Labs:   CBC:   Recent Labs   Lab 03/14/20 2111   WBC 6.38   HGB 11.5*   HCT 36.4*        CMP:   Recent Labs   Lab 03/14/20 2111   *   K 3.6      CO2 24   *   BUN 11   CREATININE 0.9   CALCIUM 8.7   PROT 7.0   ALBUMIN 3.6   BILITOT 0.8   ALKPHOS 44*   AST 22   ALT 29   ANIONGAP 9   EGFRNONAA >60.0     Cardiac Markers:   Recent Labs   Lab 03/14/20 2111   BNP 19     Lactic Acid:   Recent Labs   Lab 03/14/20 2120   LACTATE 2.1*     All pertinent labs within the past 24 hours have been reviewed.    Significant Imaging: I have reviewed all pertinent imaging results/findings within the past 24 hours.   Imaging Results          CTA Chest Non-Coronary (PE Study) (Final result)  Result time 03/14/20 23:44:14    Final result by Winston Kurtz MD (03/14/20 23:44:14)                 Narrative:        Exam: CTA OF THE CHEST WITH CONTRAST    Clinical data: Chest pain, acute, PE suspected, high pretest prob.    Technique: Axial CT angiography images through the lungs were acquired with  contrast and imaged using soft tissue and lung algorithms. Coronal, sagittal,  and 3D volume reconstructions were performed. Reformatted/3D-MPR images were  performed.    Prior studies: Radiograph of the chest dated 03/14/2020, images only.    Findings:    Lungs: No pulmonary mass identified.  Bilateral minimal pleural effusions with  passive collapse bilateral lower lobes.  Area of groundglass opacity with patchy  consolidation in bilateral lower lobes. No pneumothorax. The airway is clear.    Soft Tissues: Subcentimeter-sized bilateral paratracheal, prevascular and  subcarinal lymph  nodes.  No other significant mediastinal, axillary or  supraclavicular adenopathy is identified.  Diffusely bulky left lobe of thyroid.    Vascular: No filling defect within the pulmonary arteries to the segmental  branch level.  Mildly prominent pulmonary artery and its branches. Unremarkable  aorta. Grossly unremarkable sized heart.  No definite abnormality seen on 3D  reformatted images.    Bony structures: No acute or destructive abnormality.    Upper Abdomen: Limited visualization of the solid upper abdominal organs is  grossly unremarkable.    IMPRESSION:    1.  Mildly prominent pulmonary artery and its branches however no evidence of  pulmonary embolism.  2. Bilateral minimal pleural effusions with passive collapse bilateral lower  lobes.  3. Area of patchy groundglass opacity with patchy consolidation in bilateral  lower lobes worrisome for edema versus pneumonia. Followup after treatment  recommended to ensure resolution and exclude any pulmonary nodules. .  4. Subcentimeter-sized bilateral paratracheal, prevascular and subcarinal lymph  nodes.    Recommendation:  Follow up as clinically indicated.    All CT scans at this facility utilize dose modulation, iterative reconstruction,  and/or weight based dosing when appropriate to reduce radiation dose to as low  as reasonably achievable.      Electronically Signed by ASHIA KURTZ MD at 3/15/2020 12:27:57 AM EST                             X-Ray Chest AP Portable (Final result)  Result time 03/15/20 00:23:09    Final result by Ashia Kurtz MD (03/15/20 00:23:09)                 Narrative:        Exam: X-RAY OF THE CHEST    Clinical data: Sepsis.    Technique: Single view of the chest.    Prior studies: No prior studies submitted.    Findings: Mild rightward deviated trachea. Bilateral trace pleural effusions and  lower lobe infiltrates. Cardiac silhouette is within normal limits.  No acute  osseous abnormality is detected.    IMPRESSION:  Bilateral  trace pleural effusions and lower lobe infiltrates.      Recommendation:  Follow up as clinically indicated.      Electronically Signed by ASHIA SON MD at 3/15/2020 1:23:09 AM EST                            EKG independently reviewed.  My interpretation sinus tachycardia , no st segment elevations/depressions

## 2020-03-15 NOTE — ASSESSMENT & PLAN NOTE
Admit to med/surg  On admission: SIRS (+3/4): , RR 20 ,Temp 101.3) and QSOFA (+2/3): HR, RR  Source: PNA  LA 2.1  Will trend until LA within normal limits. Decreased to 1.2  Procal negative and Influenza A/b negative   Imaging: CTA: patchy groundglass opacity with patchy consolidation in bilateral lower lobes  IVABx started in ED: Vanc, Zosyn, Levofloxacin  IVF: 30cc/kg bolus and will continue with NS @ 125 cc/kg  Blood (NGTD), urine (contaminated), resp cx (pending)  COVID-19 swab sent out to [facility], pending      Rule out Covid-19  Risk: LOW RISK (negative travel or known covid + contacts. 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

## 2020-03-15 NOTE — ASSESSMENT & PLAN NOTE
Chronic medical condition  Holding home medications: actos, metformin, trulicity- stable  Low dose SSI  accolivia abebe

## 2020-03-15 NOTE — CONSULTS
Consult Note  Infectious Disease    Reason for Consult:      HPI: Patria Bennett is a ill appearing 49 y.o. female  NIDDM, HTN. GERD came in complaining of cough, progressive to shortness of breath and fever, over the past 5 days.  It started on 03/10/2020.  She also had some sore throat and hurting all over.  She was seen at urgent care where she receives a steroid shot and Zithromax.  She felt better for happened a only to become progressively worse till she came to the hospital.  CT chest ruled out PE but found ground-glass opacities.  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  WBC was normal.  Procalcitonin is negative.   She was given broad antimicrobial coverage.  Zosyn, vancomycin, levofloxacin.    [facility] Test Code:  [id number]        Review of patient's allergies indicates:  No Known Allergies  Past Medical History:   Diagnosis Date    Acid reflux     Diabetes mellitus type I     Hypertension      Past Surgical History:   Procedure Laterality Date    REDUCTION OF BOTH BREASTS      TONSILLECTOMY       Social History     Socioeconomic History    Marital status:      Spouse name: Not on file    Number of children: Not on file    Years of education: Not on file    Highest education level: Not on file   Occupational History    Not on file   Social Needs    Financial resource strain: Not on file    Food insecurity:     Worry: Not on file     Inability: Not on file    Transportation needs:     Medical: Not on file     Non-medical: Not on file   Tobacco Use    Smoking status: Never Smoker    Smokeless tobacco: Never Used   Substance and Sexual Activity    Alcohol use: Never     Frequency: Never    Drug use: Never    Sexual activity: Not on file   Lifestyle    Physical  activity:     Days per week: Not on file     Minutes per session: Not on file    Stress: Not on file   Relationships    Social connections:     Talks on phone: Not on file     Gets together: Not on file     Attends Sikhism service: Not on file     Active member of club or organization: Not on file     Attends meetings of clubs or organizations: Not on file     Relationship status: Not on file   Other Topics Concern    Not on file   Social History Narrative    Not on file     History reviewed. No pertinent family history.    Pertinent medications noted:     Review of Systems:   Positive for chills, fever,  Hurting all over   No change in vision, loss of vision or diplopia  No sinus congestion, purulent nasal discharge, post nasal drip or facial pain  No pain in mouth or throat. No problems with teeth, gums.  No chest pain, palpitations, syncope  Positive cough, sputum production, shortness of breath, dyspnea on exertion,   No pleurisy, hemoptysis  No nausea, vomiting, diarrhea, constipation, blood in stool, or focal abd pain,  No dysphagia, odynophagia  No dysuria, hematuria, strangury, retention, incontinence, nocturia, prostatism,   No vaginal/penile discharge, genital ulcers, risk for STD  No swelling of joints, redness of joints, injuries, or new focal pain  No unusual headaches, dizziness, vertigo, numbness, paresthesias, neuropathy, falls  No anxiety, depression, substance abuse, sleep disturbance  No diabetes, thyroid, hypogonadal conditions  No bleeding, lymphadenopathy, anemia, malignancy, unusual bruising  No new rashes, lesions, or wounds  No TB exposure no  Yes recent/prior steroids  Outdoor activities:  No  Travel:  No  Implants:   Antibiotic History:  Zithromax 3 days before admission    EXAM & DIAGNOSTICS REVIEWED:   Vitals:     Temp:  [98.7 °F (37.1 °C)-101.3 °F (38.5 °C)]   Temp: 98.7 °F (37.1 °C) (03/15/20 0900)  Pulse: 85 (03/15/20 0304)  Resp: 20 (03/15/20 0900)  BP: (!) 107/58 (03/15/20  0900)  SpO2: (!) 94 % (03/15/20 0900)  No intake or output data in the 24 hours ending 03/15/20 1054    General:  In NAD. Alert and attentive, cooperative, comfortable.  Morbidly obese  Eyes:  Anicteric, PERRL, EOMI  ENT:  No ulcers, exudates, thrush, nares patent, dentition is normal.  Dry mucous membranes.  Neck:  supple, no masses or adenopathy appreciated  Lungs: Limited due to body habitus, otherwise no wheezing.  Heart:  RRR, no gallop/murmur/rub noted  Abd:  Soft, NT, ND, normal BS, no masses or organomegaly appreciated.  :  Voids/Matson, urine clear, no flank tenderness  Musc:  Joints without effusion, swelling, erythema, synovitis, muscle wasting.   Skin:  No rashes. No palmar or plantar lesions. No subungual petechiae  Wound: No open wounds  Neuro:  Alert, attentive, speech fluent, face symmetric, moves all extremities, no focal weakness. Ambulatory  Psych:  Calm, cooperative good eye contact  Lymphatic:     No cervical, supraclavicular, axillary, or inguinal nodes  Extrem: No edema, erythema, phlebitis, cellulitis, warm and well perfused  VAD:       Isolation:      Lines/Tubes/Drains:    General Labs reviewed:  Recent Labs   Lab 03/14/20  2111 03/15/20  0430   WBC 6.38 4.56   HGB 11.5* 9.9*   HCT 36.4* 31.8*    157       Recent Labs   Lab 03/14/20  2111 03/15/20  0430   * 133*  133*   K 3.6 3.2*  3.2*    101  101   CO2 24 21*  21*   BUN 11 9  9   CREATININE 0.9 0.8  0.8   CALCIUM 8.7 8.3*  8.3*   PROT 7.0 6.0   BILITOT 0.8 0.8   ALKPHOS 44* 38*   ALT 29 22   AST 22 19           Micro:  Microbiology Results (last 7 days)     Procedure Component Value Units Date/Time    Blood Culture #2 **CANNOT BE ORDERED STAT** [[id number]] Collected:  03/14/20 2240    Order Status:  Completed Specimen:  Blood from Peripheral, Forearm, Right Updated:  03/15/20 0558     Blood Culture, Routine No Growth to date    Blood Culture #1 **CANNOT BE ORDERED STAT** [[id number]] Collected:  03/14/20 1131     Order Status:  Completed Specimen:  Blood from Peripheral, Hand, Right Updated:  03/15/20 0517     Blood Culture, Routine No Growth to date    Blood culture x two cultures. Draw prior to antibiotics. [[id number]]     Order Status:  Canceled Specimen:  Blood     Blood culture x two cultures. Draw prior to antibiotics. [[id number]]     Order Status:  Canceled Specimen:  Blood         Imaging Reviewed:  CT 1.  Mildly prominent pulmonary artery and its branches however no evidence of pulmonary embolism.  2. Bilateral minimal pleural effusions with passive collapse bilateral lower lobes.  3. Area of patchy groundglass opacity with patchy consolidation in bilateral  lower lobes worrisome for edema versus pneumonia. Followup after treatment  recommended to ensure resolution and exclude any pulmonary nodules. .  4. Subcentimeter-sized bilateral paratracheal, prevascular and subcarinal lymph nodes.    IMPRESSION & PLAN     Febrile illness.  Pneumonia.  Atypical.  Being ruled out for COVID 19    History of diabetes, HTN, morbid obesity    Recommendations:  Discontinue vancomycin  Discontinue Zosyn  Continue levofloxacin  HIV screening

## 2020-03-16 LAB
ALBUMIN SERPL BCP-MCNC: 3.2 G/DL (ref 3.5–5.2)
ALP SERPL-CCNC: 44 U/L (ref 55–135)
ALT SERPL W/O P-5'-P-CCNC: 29 U/L (ref 10–44)
ANION GAP SERPL CALC-SCNC: 11 MMOL/L (ref 8–16)
ANION GAP SERPL CALC-SCNC: 11 MMOL/L (ref 8–16)
AST SERPL-CCNC: 29 U/L (ref 10–40)
BASOPHILS # BLD AUTO: 0.01 K/UL (ref 0–0.2)
BASOPHILS NFR BLD: 0.3 % (ref 0–1.9)
BILIRUB SERPL-MCNC: 0.7 MG/DL (ref 0.1–1)
BUN SERPL-MCNC: 8 MG/DL (ref 6–20)
BUN SERPL-MCNC: 8 MG/DL (ref 6–20)
CALCIUM SERPL-MCNC: 8.3 MG/DL (ref 8.7–10.5)
CALCIUM SERPL-MCNC: 8.3 MG/DL (ref 8.7–10.5)
CHLORIDE SERPL-SCNC: 97 MMOL/L (ref 95–110)
CHLORIDE SERPL-SCNC: 97 MMOL/L (ref 95–110)
CO2 SERPL-SCNC: 26 MMOL/L (ref 23–29)
CO2 SERPL-SCNC: 26 MMOL/L (ref 23–29)
CREAT SERPL-MCNC: 0.7 MG/DL (ref 0.5–1.4)
CREAT SERPL-MCNC: 0.7 MG/DL (ref 0.5–1.4)
DIFFERENTIAL METHOD: ABNORMAL
EOSINOPHIL # BLD AUTO: 0 K/UL (ref 0–0.5)
EOSINOPHIL NFR BLD: 0 % (ref 0–8)
ERYTHROCYTE [DISTWIDTH] IN BLOOD BY AUTOMATED COUNT: 13.6 % (ref 11.5–14.5)
EST. GFR  (AFRICAN AMERICAN): >60 ML/MIN/1.73 M^2
EST. GFR  (AFRICAN AMERICAN): >60 ML/MIN/1.73 M^2
EST. GFR  (NON AFRICAN AMERICAN): >60 ML/MIN/1.73 M^2
EST. GFR  (NON AFRICAN AMERICAN): >60 ML/MIN/1.73 M^2
GLUCOSE SERPL-MCNC: 142 MG/DL (ref 70–110)
GLUCOSE SERPL-MCNC: 155 MG/DL (ref 70–110)
GLUCOSE SERPL-MCNC: 158 MG/DL (ref 70–110)
GLUCOSE SERPL-MCNC: 158 MG/DL (ref 70–110)
GLUCOSE SERPL-MCNC: 162 MG/DL (ref 70–110)
HCT VFR BLD AUTO: 31.8 % (ref 37–48.5)
HGB BLD-MCNC: 10.1 G/DL (ref 12–16)
HIV1+2 IGG SERPL QL IA.RAPID: NEGATIVE
IMM GRANULOCYTES # BLD AUTO: 0.01 K/UL (ref 0–0.04)
IMM GRANULOCYTES NFR BLD AUTO: 0.3 % (ref 0–0.5)
LYMPHOCYTES # BLD AUTO: 1.1 K/UL (ref 1–4.8)
LYMPHOCYTES NFR BLD: 29 % (ref 18–48)
MAGNESIUM SERPL-MCNC: 1.5 MG/DL (ref 1.6–2.6)
MCH RBC QN AUTO: 26.2 PG (ref 27–31)
MCHC RBC AUTO-ENTMCNC: 31.8 G/DL (ref 32–36)
MCV RBC AUTO: 83 FL (ref 82–98)
MONOCYTES # BLD AUTO: 0.2 K/UL (ref 0.3–1)
MONOCYTES NFR BLD: 4.7 % (ref 4–15)
NEUTROPHILS # BLD AUTO: 2.5 K/UL (ref 1.8–7.7)
NEUTROPHILS NFR BLD: 65.7 % (ref 38–73)
NRBC BLD-RTO: 0 /100 WBC
PHOSPHATE SERPL-MCNC: 2.8 MG/DL (ref 2.7–4.5)
PLATELET # BLD AUTO: 165 K/UL (ref 150–350)
PMV BLD AUTO: 9.6 FL (ref 9.2–12.9)
POTASSIUM SERPL-SCNC: 3.6 MMOL/L (ref 3.5–5.1)
POTASSIUM SERPL-SCNC: 3.6 MMOL/L (ref 3.5–5.1)
PROT SERPL-MCNC: 6.5 G/DL (ref 6–8.4)
RBC # BLD AUTO: 3.85 M/UL (ref 4–5.4)
SODIUM SERPL-SCNC: 134 MMOL/L (ref 136–145)
SODIUM SERPL-SCNC: 134 MMOL/L (ref 136–145)
WBC # BLD AUTO: 3.79 K/UL (ref 3.9–12.7)

## 2020-03-16 PROCEDURE — 80053 COMPREHEN METABOLIC PANEL: CPT

## 2020-03-16 PROCEDURE — 86703 HIV-1/HIV-2 1 RESULT ANTBDY: CPT

## 2020-03-16 PROCEDURE — 36415 COLL VENOUS BLD VENIPUNCTURE: CPT

## 2020-03-16 PROCEDURE — 99900035 HC TECH TIME PER 15 MIN (STAT)

## 2020-03-16 PROCEDURE — 25000003 PHARM REV CODE 250: Performed by: STUDENT IN AN ORGANIZED HEALTH CARE EDUCATION/TRAINING PROGRAM

## 2020-03-16 PROCEDURE — 84100 ASSAY OF PHOSPHORUS: CPT

## 2020-03-16 PROCEDURE — 94761 N-INVAS EAR/PLS OXIMETRY MLT: CPT

## 2020-03-16 PROCEDURE — 82955 ASSAY OF G6PD ENZYME: CPT

## 2020-03-16 PROCEDURE — 63600175 PHARM REV CODE 636 W HCPCS: Performed by: STUDENT IN AN ORGANIZED HEALTH CARE EDUCATION/TRAINING PROGRAM

## 2020-03-16 PROCEDURE — 83735 ASSAY OF MAGNESIUM: CPT

## 2020-03-16 PROCEDURE — 63600175 PHARM REV CODE 636 W HCPCS: Performed by: INTERNAL MEDICINE

## 2020-03-16 PROCEDURE — 25000242 PHARM REV CODE 250 ALT 637 W/ HCPCS: Performed by: STUDENT IN AN ORGANIZED HEALTH CARE EDUCATION/TRAINING PROGRAM

## 2020-03-16 PROCEDURE — 82652 VIT D 1 25-DIHYDROXY: CPT

## 2020-03-16 PROCEDURE — 12000002 HC ACUTE/MED SURGE SEMI-PRIVATE ROOM

## 2020-03-16 PROCEDURE — 94640 AIRWAY INHALATION TREATMENT: CPT

## 2020-03-16 PROCEDURE — 25000003 PHARM REV CODE 250: Performed by: INTERNAL MEDICINE

## 2020-03-16 PROCEDURE — 27000221 HC OXYGEN, UP TO 24 HOURS

## 2020-03-16 PROCEDURE — 85025 COMPLETE CBC W/AUTO DIFF WBC: CPT

## 2020-03-16 PROCEDURE — 82962 GLUCOSE BLOOD TEST: CPT

## 2020-03-16 RX ORDER — GUAIFENESIN 600 MG/1
1200 TABLET, EXTENDED RELEASE ORAL 2 TIMES DAILY
Status: COMPLETED | OUTPATIENT
Start: 2020-03-16 | End: 2020-03-20

## 2020-03-16 RX ORDER — OXYMETAZOLINE HCL 0.05 %
2 SPRAY, NON-AEROSOL (ML) NASAL 2 TIMES DAILY
Status: COMPLETED | OUTPATIENT
Start: 2020-03-16 | End: 2020-03-18

## 2020-03-16 RX ORDER — BUTALBITAL, ACETAMINOPHEN AND CAFFEINE 50; 325; 40 MG/1; MG/1; MG/1
1 TABLET ORAL EVERY 6 HOURS PRN
Status: DISCONTINUED | OUTPATIENT
Start: 2020-03-16 | End: 2020-03-25 | Stop reason: HOSPADM

## 2020-03-16 RX ADMIN — BUTALBITAL, ACETAMINOPHEN AND CAFFEINE 1 TABLET: 50; 325; 40 TABLET ORAL at 01:03

## 2020-03-16 RX ADMIN — MAGNESIUM SULFATE HEPTAHYDRATE 3 G: 500 INJECTION, SOLUTION INTRAMUSCULAR; INTRAVENOUS at 11:03

## 2020-03-16 RX ADMIN — INSULIN ASPART 2 UNITS: 100 INJECTION, SOLUTION INTRAVENOUS; SUBCUTANEOUS at 07:03

## 2020-03-16 RX ADMIN — HYDROXYCHLOROQUINE SULFATE 400 MG: 200 TABLET, FILM COATED ORAL at 08:03

## 2020-03-16 RX ADMIN — GUAIFENESIN 1200 MG: 600 TABLET, EXTENDED RELEASE ORAL at 01:03

## 2020-03-16 RX ADMIN — INSULIN ASPART 1 UNITS: 100 INJECTION, SOLUTION INTRAVENOUS; SUBCUTANEOUS at 09:03

## 2020-03-16 RX ADMIN — LEVALBUTEROL HYDROCHLORIDE 1.25 MG: 1.25 SOLUTION, CONCENTRATE RESPIRATORY (INHALATION) at 12:03

## 2020-03-16 RX ADMIN — INSULIN DETEMIR 10 UNITS: 100 INJECTION, SOLUTION SUBCUTANEOUS at 09:03

## 2020-03-16 RX ADMIN — ESCITALOPRAM OXALATE 10 MG: 10 TABLET ORAL at 08:03

## 2020-03-16 RX ADMIN — Medication 2 SPRAY: at 09:03

## 2020-03-16 RX ADMIN — LEVOFLOXACIN 500 MG: 5 INJECTION, SOLUTION INTRAVENOUS at 01:03

## 2020-03-16 RX ADMIN — GUAIFENESIN 1200 MG: 600 TABLET, EXTENDED RELEASE ORAL at 09:03

## 2020-03-16 RX ADMIN — ACETAMINOPHEN 650 MG: 325 TABLET ORAL at 04:03

## 2020-03-16 RX ADMIN — ENOXAPARIN SODIUM 40 MG: 100 INJECTION SUBCUTANEOUS at 09:03

## 2020-03-16 RX ADMIN — ENOXAPARIN SODIUM 40 MG: 100 INJECTION SUBCUTANEOUS at 08:03

## 2020-03-16 RX ADMIN — ACETAMINOPHEN 650 MG: 325 TABLET ORAL at 01:03

## 2020-03-16 RX ADMIN — IBUPROFEN 600 MG: 400 TABLET ORAL at 09:03

## 2020-03-16 RX ADMIN — HYDROXYCHLOROQUINE SULFATE 200 MG: 200 TABLET, FILM COATED ORAL at 09:03

## 2020-03-16 RX ADMIN — Medication 2 SPRAY: at 01:03

## 2020-03-16 NOTE — PLAN OF CARE
03/16/20 1202   Patient Assessment/Suction   Level of Consciousness (AVPU) alert   Respiratory Effort Normal;Unlabored   Expansion/Accessory Muscles/Retractions no use of accessory muscles   All Lung Fields Breath Sounds diminished   Rhythm/Pattern, Respiratory shortness of breath   Cough Frequency infrequent   Cough Type dry;fair   PRE-TX-O2   O2 Device (Oxygen Therapy) nasal cannula   Flow (L/min) 2   SpO2 100 %   Pulse (!) 113   Resp (!) 22   Aerosol Therapy   $ Aerosol Therapy Charges Aerosol Treatment   Daily Review of Necessity (SVN) completed   Respiratory Treatment Status (SVN) given   Treatment Route (SVN) oxygen;mask   Patient Position (SVN) Magaña's   Post Treatment Assessment (SVN) breath sounds improved   Signs of Intolerance (SVN) none   Breath Sounds Post-Respiratory Treatment   Throughout All Fields Post-Treatment All Fields   Throughout All Fields Post-Treatment aeration increased   Post-treatment Heart Rate (beats/min) 88   Post-treatment Resp Rate (breaths/min) 16   Respiratory Evaluation   $ Care Plan Tech Time 15 min

## 2020-03-16 NOTE — PROGRESS NOTES
"Formerly [facility], later [facility] Medicine Progress Note  Patient Name: Patria Bennett MRN: [id number]   Patient Class: IP- Inpatient  Length of Stay: 2   Admission Date: 3/14/2020  7:46 PM Attending Physician: Rio Manning MD   Primary Care Provider: DINAH Leos Face-to-Face encounter date: 03/16/2020   Chief Complaint: Shortness of Breath (cough x 4 days was seen at [facility] 2 days ago dx with pneumonia pt reports symptoms are getting worse) and Headache    Assessment & Plan:   Patria Bennett is a 49 y.o. female admitted for    1. Pneumonia - atypical - Levofloxacin    2. Covid 19 testing pending - symptomatic management at this time and hydroxychloroquine tablet.     3. Mood disorder: escitalopram    4. Diabetes Mellitus: Insulin sliding scale    Discharge Planning:   No mobility needs.    Subjective:    Interval History: Patient is having symptoms. Short of breath. Coughing. Not bringing up lot of sputum. No family present at bedside.No concerns/issues overnight reported by the patient or the nursing staff.    Review of Systems All other Review of Systems were found to be negative expect for that mentioned already in HPI.   Objective:   Physical Exam  BP (!) 140/76 (BP Location: Left arm, Patient Position: Lying)   Pulse 96   Temp (!) 101.2 °F (38.4 °C) (Oral)   Resp 20   Ht 5' 9" (1.753 m)   Wt 133.4 kg (294 lb)   SpO2 95%   Breastfeeding? No   BMI 43.42 kg/m²   Vitals reviewed.    Constitutional: No distress.   HENT: Atraumatic.   Cardiovascular: Normal rate, regular rhythm and normal heart sounds.   Pulmonary/Chest: Effort normal. Clear to auscultation bilaterally. No wheezes.   Abdominal: Soft. Bowel sounds are normal. Exhibits no distension and no mass. No tenderness  Neurological: Alert.   Skin: Skin is warm and dry.     Following labs were Reviewed   Recent Labs   Lab 03/16/20  0512   WBC 3.79*   HGB 10.1*   HCT 31.8*      CALCIUM 8.3*  8.3*   ALBUMIN 3.2*   PROT 6.5 "   *  134*   K 3.6  3.6   CO2 26  26   CL 97  97   BUN 8  8   CREATININE 0.7  0.7   ALKPHOS 44*   ALT 29   AST 29   BILITOT 0.7     No results found for: POCTGLUCOSE     All labs within the past 24 hours have been reviewed  Microbiology Results (last 7 days)     Procedure Component Value Units Date/Time    Blood Culture #2 **CANNOT BE ORDERED STAT** [[id number]] Collected:  03/14/20 2240    Order Status:  Completed Specimen:  Blood from Peripheral, Forearm, Right Updated:  03/16/20 0232     Blood Culture, Routine No Growth to date      No Growth to date    Blood Culture #1 **CANNOT BE ORDERED STAT** [[id number]] Collected:  03/14/20 2125    Order Status:  Completed Specimen:  Blood from Peripheral, Hand, Right Updated:  03/16/20 0232     Blood Culture, Routine No Growth to date      No Growth to date    Blood culture x two cultures. Draw prior to antibiotics. [[id number]]     Order Status:  Canceled Specimen:  Blood     Blood culture x two cultures. Draw prior to antibiotics. [[id number]]     Order Status:  Canceled Specimen:  Blood         CTA Chest Non-Coronary (PE Study)   Final Result      X-Ray Chest AP Portable   Final Result          Inpatient medications  Scheduled Meds:   enoxaparin  40 mg Subcutaneous Q12H    escitalopram oxalate  10 mg Oral Daily    guaiFENesin  1,200 mg Oral BID    hydroxychloroquine  200 mg Oral BID    insulin detemir U-100  10 Units Subcutaneous Daily    levoFLOXacin  500 mg Intravenous Q24H    magnesium sulfate IVPB  3 g Intravenous Once    oxymetazoline  2 spray Each Nostril BID     Continuous Infusions:   sodium chloride 0.9% 125 mL/hr at 03/15/20 0840     PRN Meds:.acetaminophen, butalbital-acetaminophen-caffeine -40 mg, dextrose 50%, dextrose 50%, glucagon (human recombinant), glucose, glucose, hydrALAZINE, ibuprofen, insulin aspart U-100, levalbuterol, LORazepam, melatonin, morphine, ondansetron, sodium chloride, sodium chloride 0.9%    Above encounter  included review of the medical records, interviewing and examining the patient face-to-face, discussion with family and other health care providers, ordering and interpreting lab/test results and formulating a plan of care.     Medical Decision Making:    [] Low Complexity  [x] Moderate Complexity  [] High Complexity    Rio Manning  [facility] Hospitalist  03/16/2020

## 2020-03-16 NOTE — PROGRESS NOTES
Progress Note  Infectious Disease    Reason for Consult:      HPI: Patria Bennett is a ill appearing 49 y.o. female  NIDDM, HTN. GERD came in complaining of cough, progressive to shortness of breath and fever, over the past 5 days.  It started on 03/10/2020.  She also had some sore throat and hurting all over.  She was seen at urgent care where she receives a steroid shot and Zithromax.  She felt better for happened a only to become progressively worse till she came to the hospital.  CT chest ruled out PE but found ground-glass opacities.  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  WBC was normal.  Procalcitonin is negative.   She was given broad antimicrobial coverage.  Zosyn, vancomycin, levofloxacin.    03/16/2020 she feels congested, nasal congestion, cough, phlegm is clear, fever as below.  She feels had headache which is mostly frontal and behind her eyes.  In general she feels improved.  She is still short of breath especially with movement.  She just received a breathing treatment which made her feel better.  She receives between 2 and 3 L of O2.  At this time she has taken the O2 of her face.  She gets short of breath when talking.      EXAM & DIAGNOSTICS REVIEWED:   Vitals:     Temp:  [98.4 °F (36.9 °C)-102.3 °F (39.1 °C)]   Temp: 99.9 °F (37.7 °C) (03/16/20 0430)  Pulse: (!) 113 (03/16/20 1202)  Resp: (!) 22 (03/16/20 1202)  BP: 139/82 (03/16/20 0430)  SpO2: 100 % (03/16/20 1202)    Intake/Output Summary (Last 24 hours) at 3/16/2020 1229  Last data filed at 3/16/2020 0430  Gross per 24 hour   Intake 1900 ml   Output --   Net 1900 ml       General:  In NAD. Alert and attentive, cooperative, comfortable.  Morbidly obese  Eyes:  Anicteric, PERRL, EOMI  ENT:  No ulcers, exudates,  thrush, nares patent, dentition is normal.    Neck:  supple, no masses or adenopathy appreciated  Lungs: Limited due to body habitus, otherwise no wheezing.  Heart:  RRR, no gallop/murmur/rub noted  Abd:  Soft, NT, ND, normal BS, no masses or organomegaly appreciated.  :  Voids/Matson, urine clear, no flank tenderness  Musc:  Joints without effusion, swelling, erythema, synovitis, muscle wasting.   Skin:  No rashes. No palmar or plantar lesions. No subungual petechiae  Wound: No open wounds  Neuro:   Alert, attentive, speech fluent, face symmetric, moves all extremities, no focal weakness. Ambulatory  Psych:  Calm, cooperative good eye contact  Lymphatic:     No cervical, supraclavicular, axillary, or inguinal nodes  Extrem: No edema, erythema, phlebitis, cellulitis, warm and well perfused  VAD:       Isolation:      Lines/Tubes/Drains:    General Labs reviewed:  Recent Labs   Lab 03/14/20  2111 03/15/20  0430 03/16/20  0512   WBC 6.38 4.56 3.79*   HGB 11.5* 9.9* 10.1*   HCT 36.4* 31.8* 31.8*    157 165       Recent Labs   Lab 03/14/20  2111 03/15/20  0430 03/16/20  0512   * 133*  133* 134*  134*   K 3.6 3.2*  3.2* 3.6  3.6    101  101 97  97   CO2 24 21*  21* 26  26   BUN 11 9  9 8  8   CREATININE 0.9 0.8  0.8 0.7  0.7   CALCIUM 8.7 8.3*  8.3* 8.3*  8.3*   PROT 7.0 6.0 6.5   BILITOT 0.8 0.8 0.7   ALKPHOS 44* 38* 44*   ALT 29 22 29   AST 22 19 29           Micro:  Microbiology Results (last 7 days)     Procedure Component Value Units Date/Time    Blood Culture #2 **CANNOT BE ORDERED STAT** [[id number]] Collected:  03/14/20 2240    Order Status:  Completed Specimen:  Blood from Peripheral, Forearm, Right Updated:  03/16/20 0232     Blood Culture, Routine No Growth to date      No Growth to date    Blood Culture #1 **CANNOT BE ORDERED STAT** [[id number]] Collected:  03/14/20 2125    Order Status:  Completed Specimen:  Blood from Peripheral, Hand, Right Updated:  03/16/20 0232     Blood  Culture, Routine No Growth to date      No Growth to date    Blood culture x two cultures. Draw prior to antibiotics. [[id number]]     Order Status:  Canceled Specimen:  Blood     Blood culture x two cultures. Draw prior to antibiotics. [[id number]]     Order Status:  Canceled Specimen:  Blood         Imaging Reviewed:  CT 1.  Mildly prominent pulmonary artery and its branches however no evidence of pulmonary embolism.  2. Bilateral minimal pleural effusions with passive collapse bilateral lower lobes.  3. Area of patchy groundglass opacity with patchy consolidation in bilateral  lower lobes worrisome for edema versus pneumonia. Followup after treatment  recommended to ensure resolution and exclude any pulmonary nodules. .  4. Subcentimeter-sized bilateral paratracheal, prevascular and subcarinal lymph nodes.    IMPRESSION & PLAN     Febrile illness.  Pneumonia.  Atypical.  Being ruled out for COVID 19  [facility] Test Code:  106888      History of diabetes, HTN, morbid obesity  HIV screen negative.    Recommendations:  Continue levofloxacin  I started hydroxychloroquine yesterday based on on suspicion of covid  Symptomatic treatment for nasal congestion and headache, please see orders.  Humidifier oxygen to have her be more comfortable.

## 2020-03-16 NOTE — PLAN OF CARE
Patient has complained of headaches all night has tolerated antibiotics well and had to start a new IV during the night

## 2020-03-17 LAB
ALBUMIN SERPL BCP-MCNC: 3.5 G/DL (ref 3.5–5.2)
ALP SERPL-CCNC: 50 U/L (ref 55–135)
ALT SERPL W/O P-5'-P-CCNC: 29 U/L (ref 10–44)
ANION GAP SERPL CALC-SCNC: 4 MMOL/L (ref 8–16)
ANION GAP SERPL CALC-SCNC: 4 MMOL/L (ref 8–16)
AST SERPL-CCNC: 35 U/L (ref 10–40)
BASOPHILS # BLD AUTO: 0.01 K/UL (ref 0–0.2)
BASOPHILS NFR BLD: 0.3 % (ref 0–1.9)
BILIRUB SERPL-MCNC: 0.7 MG/DL (ref 0.1–1)
BUN SERPL-MCNC: 7 MG/DL (ref 6–20)
BUN SERPL-MCNC: 7 MG/DL (ref 6–20)
CALCIUM SERPL-MCNC: 8.5 MG/DL (ref 8.7–10.5)
CALCIUM SERPL-MCNC: 8.5 MG/DL (ref 8.7–10.5)
CHLORIDE SERPL-SCNC: 103 MMOL/L (ref 95–110)
CHLORIDE SERPL-SCNC: 103 MMOL/L (ref 95–110)
CO2 SERPL-SCNC: 29 MMOL/L (ref 23–29)
CO2 SERPL-SCNC: 29 MMOL/L (ref 23–29)
CREAT SERPL-MCNC: 0.6 MG/DL (ref 0.5–1.4)
CREAT SERPL-MCNC: 0.6 MG/DL (ref 0.5–1.4)
DIFFERENTIAL METHOD: ABNORMAL
EOSINOPHIL # BLD AUTO: 0 K/UL (ref 0–0.5)
EOSINOPHIL NFR BLD: 0 % (ref 0–8)
ERYTHROCYTE [DISTWIDTH] IN BLOOD BY AUTOMATED COUNT: 13.5 % (ref 11.5–14.5)
EST. GFR  (AFRICAN AMERICAN): >60 ML/MIN/1.73 M^2
EST. GFR  (AFRICAN AMERICAN): >60 ML/MIN/1.73 M^2
EST. GFR  (NON AFRICAN AMERICAN): >60 ML/MIN/1.73 M^2
EST. GFR  (NON AFRICAN AMERICAN): >60 ML/MIN/1.73 M^2
GLUCOSE SERPL-MCNC: 135 MG/DL (ref 70–110)
GLUCOSE SERPL-MCNC: 144 MG/DL (ref 70–110)
GLUCOSE SERPL-MCNC: 144 MG/DL (ref 70–110)
GLUCOSE SERPL-MCNC: 156 MG/DL (ref 70–110)
GLUCOSE SERPL-MCNC: 162 MG/DL (ref 70–110)
GLUCOSE SERPL-MCNC: 166 MG/DL (ref 70–110)
HCT VFR BLD AUTO: 35.5 % (ref 37–48.5)
HGB BLD-MCNC: 11.2 G/DL (ref 12–16)
IMM GRANULOCYTES # BLD AUTO: 0.01 K/UL (ref 0–0.04)
IMM GRANULOCYTES NFR BLD AUTO: 0.3 % (ref 0–0.5)
LYMPHOCYTES # BLD AUTO: 1.3 K/UL (ref 1–4.8)
LYMPHOCYTES NFR BLD: 33.9 % (ref 18–48)
MAGNESIUM SERPL-MCNC: 2.1 MG/DL (ref 1.6–2.6)
MCH RBC QN AUTO: 26.2 PG (ref 27–31)
MCHC RBC AUTO-ENTMCNC: 31.5 G/DL (ref 32–36)
MCV RBC AUTO: 83 FL (ref 82–98)
MONOCYTES # BLD AUTO: 0.2 K/UL (ref 0.3–1)
MONOCYTES NFR BLD: 4.3 % (ref 4–15)
NEUTROPHILS # BLD AUTO: 2.3 K/UL (ref 1.8–7.7)
NEUTROPHILS NFR BLD: 61.2 % (ref 38–73)
NRBC BLD-RTO: 0 /100 WBC
PHOSPHATE SERPL-MCNC: 3.1 MG/DL (ref 2.7–4.5)
PLATELET # BLD AUTO: 188 K/UL (ref 150–350)
PMV BLD AUTO: 10 FL (ref 9.2–12.9)
POTASSIUM SERPL-SCNC: 3.5 MMOL/L (ref 3.5–5.1)
POTASSIUM SERPL-SCNC: 3.5 MMOL/L (ref 3.5–5.1)
PROT SERPL-MCNC: 7.1 G/DL (ref 6–8.4)
RBC # BLD AUTO: 4.28 M/UL (ref 4–5.4)
SODIUM SERPL-SCNC: 136 MMOL/L (ref 136–145)
SODIUM SERPL-SCNC: 136 MMOL/L (ref 136–145)
WBC # BLD AUTO: 3.72 K/UL (ref 3.9–12.7)

## 2020-03-17 PROCEDURE — 99900035 HC TECH TIME PER 15 MIN (STAT)

## 2020-03-17 PROCEDURE — 25000003 PHARM REV CODE 250: Performed by: INTERNAL MEDICINE

## 2020-03-17 PROCEDURE — 36415 COLL VENOUS BLD VENIPUNCTURE: CPT

## 2020-03-17 PROCEDURE — 27000221 HC OXYGEN, UP TO 24 HOURS

## 2020-03-17 PROCEDURE — 12000002 HC ACUTE/MED SURGE SEMI-PRIVATE ROOM

## 2020-03-17 PROCEDURE — 25000003 PHARM REV CODE 250: Performed by: STUDENT IN AN ORGANIZED HEALTH CARE EDUCATION/TRAINING PROGRAM

## 2020-03-17 PROCEDURE — 83735 ASSAY OF MAGNESIUM: CPT

## 2020-03-17 PROCEDURE — 94640 AIRWAY INHALATION TREATMENT: CPT

## 2020-03-17 PROCEDURE — 84100 ASSAY OF PHOSPHORUS: CPT

## 2020-03-17 PROCEDURE — 63600175 PHARM REV CODE 636 W HCPCS: Performed by: STUDENT IN AN ORGANIZED HEALTH CARE EDUCATION/TRAINING PROGRAM

## 2020-03-17 PROCEDURE — 85025 COMPLETE CBC W/AUTO DIFF WBC: CPT

## 2020-03-17 PROCEDURE — 94761 N-INVAS EAR/PLS OXIMETRY MLT: CPT

## 2020-03-17 PROCEDURE — 80053 COMPREHEN METABOLIC PANEL: CPT

## 2020-03-17 RX ORDER — POTASSIUM CHLORIDE 20 MEQ/1
40 TABLET, EXTENDED RELEASE ORAL ONCE
Status: COMPLETED | OUTPATIENT
Start: 2020-03-17 | End: 2020-03-17

## 2020-03-17 RX ORDER — ALBUTEROL SULFATE 90 UG/1
2 AEROSOL, METERED RESPIRATORY (INHALATION) EVERY 8 HOURS
Status: DISCONTINUED | OUTPATIENT
Start: 2020-03-18 | End: 2020-03-20

## 2020-03-17 RX ADMIN — POTASSIUM CHLORIDE 40 MEQ: 20 TABLET, EXTENDED RELEASE ORAL at 05:03

## 2020-03-17 RX ADMIN — Medication 2 SPRAY: at 09:03

## 2020-03-17 RX ADMIN — ENOXAPARIN SODIUM 40 MG: 100 INJECTION SUBCUTANEOUS at 10:03

## 2020-03-17 RX ADMIN — ONDANSETRON 8 MG: 4 TABLET, ORALLY DISINTEGRATING ORAL at 11:03

## 2020-03-17 RX ADMIN — ENOXAPARIN SODIUM 40 MG: 100 INJECTION SUBCUTANEOUS at 09:03

## 2020-03-17 RX ADMIN — ESCITALOPRAM OXALATE 10 MG: 10 TABLET ORAL at 10:03

## 2020-03-17 RX ADMIN — HYDROXYCHLOROQUINE SULFATE 200 MG: 200 TABLET, FILM COATED ORAL at 09:03

## 2020-03-17 RX ADMIN — INSULIN DETEMIR 10 UNITS: 100 INJECTION, SOLUTION SUBCUTANEOUS at 09:03

## 2020-03-17 RX ADMIN — LEVOFLOXACIN 500 MG: 5 INJECTION, SOLUTION INTRAVENOUS at 12:03

## 2020-03-17 RX ADMIN — LORAZEPAM 0.5 MG: 0.5 TABLET ORAL at 08:03

## 2020-03-17 RX ADMIN — HYDROXYCHLOROQUINE SULFATE 200 MG: 200 TABLET, FILM COATED ORAL at 10:03

## 2020-03-17 RX ADMIN — GUAIFENESIN 1200 MG: 600 TABLET, EXTENDED RELEASE ORAL at 09:03

## 2020-03-17 RX ADMIN — ACETAMINOPHEN 650 MG: 325 TABLET ORAL at 12:03

## 2020-03-17 RX ADMIN — IBUPROFEN 600 MG: 400 TABLET ORAL at 08:03

## 2020-03-17 RX ADMIN — GUAIFENESIN 1200 MG: 600 TABLET, EXTENDED RELEASE ORAL at 10:03

## 2020-03-17 NOTE — PROGRESS NOTES
Progress Note  Infectious Disease    Reason for Consult:      HPI: Patria Bennett is a ill appearing 49 y.o. female  NIDDM, HTN. GERD came in complaining of cough, progressive to shortness of breath and fever, over the past 5 days.  It started on 03/10/2020.  She also had some sore throat and hurting all over.  She was seen at urgent care where she receives a steroid shot and Zithromax.  She felt better for happened a only to become progressively worse till she came to the hospital.  CT chest ruled out PE but found ground-glass opacities.  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  WBC was normal.  Procalcitonin is negative.   She was given broad antimicrobial coverage.  Zosyn, vancomycin, levofloxacin.    03/16/2020 she feels congested, nasal congestion, cough, phlegm is clear, fever as below.  She feels had headache which is mostly frontal and behind her eyes.  In general she feels improved.  She is still short of breath especially with movement.  She just received a breathing treatment which made her feel better.  She receives between 2 and 3 L of O2.  At this time she has taken the O2 of her face.  She gets short of breath when talking.    3/17: notes, labs, imaging reviewed. COVID 19 testing is pending as of 5:16 pm. Still febrile to 101+. 93% on 2 liters NC. Reports fatigue AGUILAR and malaise when she has a fever. She ambulates to the restroom without oxygen and without distress. She is eating some. No diarrhea. Minimal pink sputum. Notified after our visit, that she is positive for COVID 19(confirmed 9 pm, I did not notify patient yet)    EXAM & DIAGNOSTICS REVIEWED:   Vitals:     Temp:  [98.7 °F (37.1 °C)-101.7 °F (38.7 °C)]   Temp: 99 °F (37.2 °C) (03/17/20 1700)  Pulse: 95 (03/17/20  1700)  Resp: 20 (03/17/20 1700)  BP: 123/64 (03/17/20 1700)  SpO2: (!) 93 % (03/17/20 1700)    Intake/Output Summary (Last 24 hours) at 3/17/2020 1715  Last data filed at 3/17/2020 0500  Gross per 24 hour   Intake 1900 ml   Output --   Net 1900 ml       General:  In NAD. Alert and attentive, cooperative, comfortable.  Morbidly obese  Eyes:  Anicteric,   EOMI  ENT:  No ulcers, exudates, thrush, nares patent, dentition is normal.    Neck:  supple,    Lungs:  decreased BS, coarse, no wheezing or consolidation. Comfortable on RA at rest  Heart:  RRR, no gallop/murmur/rub noted  Abd:  Soft, NT, ND, normal BS, no masses or organomegaly appreciated.  :  Voids   Musc:  Joints without effusion, swelling, erythema, synovitis, muscle wasting.   Skin:  No rashes. No palmar or plantar lesions. No subungual petechiae  Wound: No open wounds  Neuro:   Alert, attentive, speech fluent, face symmetric, moves all extremities, no focal weakness. Ambulatory  Psych:  Calm, cooperative   Lymphatic:        Extrem: No edema, erythema, phlebitis, cellulitis, warm and well perfused  VAD:  peripheral     Isolation:  Airborne/contact/droplet    Lines/Tubes/Drains:    General Labs reviewed:  Recent Labs   Lab 03/15/20  0430 03/16/20  0512 03/17/20  0450   WBC 4.56 3.79* 3.72*   HGB 9.9* 10.1* 11.2*   HCT 31.8* 31.8* 35.5*    165 188       Recent Labs   Lab 03/15/20  0430 03/16/20  0512 03/17/20  0450   *  133* 134*  134* 136  136   K 3.2*  3.2* 3.6  3.6 3.5  3.5     101 97  97 103  103   CO2 21*  21* 26  26 29  29   BUN 9  9 8  8 7  7   CREATININE 0.8  0.8 0.7  0.7 0.6  0.6   CALCIUM 8.3*  8.3* 8.3*  8.3* 8.5*  8.5*   PROT 6.0 6.5 7.1   BILITOT 0.8 0.7 0.7   ALKPHOS 38* 44* 50*   ALT 22 29 29   AST 19 29 35     COVID 19 positive      Micro:  Microbiology Results (last 7 days)     Procedure Component Value Units Date/Time    Blood Culture #1 **CANNOT BE ORDERED STAT** [[id number]] Collected:  03/14/20 2129     Order Status:  Completed Specimen:  Blood from Peripheral, Hand, Right Updated:  03/17/20 0232     Blood Culture, Routine No Growth to date      No Growth to date      No Growth to date    Blood Culture #2 **CANNOT BE ORDERED STAT** [[id number]] Collected:  03/14/20 2240    Order Status:  Completed Specimen:  Blood from Peripheral, Forearm, Right Updated:  03/17/20 0232     Blood Culture, Routine No Growth to date      No Growth to date      No Growth to date    Blood culture x two cultures. Draw prior to antibiotics. [[id number]]     Order Status:  Canceled Specimen:  Blood     Blood culture x two cultures. Draw prior to antibiotics. [[id number]]     Order Status:  Canceled Specimen:  Blood         Imaging Reviewed:  CT 1.  Mildly prominent pulmonary artery and its branches however no evidence of pulmonary embolism.  2. Bilateral minimal pleural effusions with passive collapse bilateral lower lobes.  3. Area of patchy groundglass opacity with patchy consolidation in bilateral  lower lobes worrisome for edema versus pneumonia. Followup after treatment  recommended to ensure resolution and exclude any pulmonary nodules. .  4. Subcentimeter-sized bilateral paratracheal, prevascular and subcarinal lymph nodes.    IMPRESSION  IMP  IMPRESSION:   COVID 19 PNEUMONIA  Mild hypoxemia, fever persists  History of diabetes, HTN, morbid obesity  HIV screen negative.    Recommendations:  Continue levofloxacin  Continue  hydroxychloroquine    If fever persists, or hypoxemia worsens, will add Prezcobix

## 2020-03-17 NOTE — PROGRESS NOTES
[facility] Medicine   Progress Note  Patient Name: Patria Bennett MRN: [id number]   Patient Class: IP- Inpatient  Length of Stay: 3   Admission Date: 3/14/2020  7:46 PM Attending Physician:  Shaheen Ribera MD   Primary Care Provider: DINAH Leos Face-to-Face encounter date: 03/17/2020     Assessment & Plan:   Patria Bennett is a 49 y.o. female admitted for    Assessment:  Atypical pneumonia likely viral  Chronic mood disorder  Diabetes mellitus type 2  Hypokalemia  Hypomagnesemia  Anemia likely inflammatory secondary to pneumonia    Plan:   Continue care.  Appreciate consultants.  I discussed the case with Infectious Disease Dr Alex.   Continue hydroxychloroquine.  Follow-up COVID19 testing.  Continue appropriate isolation measures.  Continue empiric Levaquin for potential bacterial pneumonia.  Will change nebulized bronchodilators to MDI bronchodilator to reduce aerosolized respiratory droplets.  Continue mucolytic and antitussives.  Monitor electrolytes.  Replace potassium today.  Repeat a.m. Labs.  Glucose stable.  Continue sliding scale insulin.  Monitor glucose and titrate regimen as needed.  Mobilize as able with PT/OT  VTE prophylaxis with Lovenox  High risk secondary to acute illness with risk to life (pneumonia); suspect likely COVID19    Subjective:    Interval History:   Today the patient reports persistent shortness of breath that is improving with medical treatment, mild intensity at rest, worsens with any exertion or activity.  Chest persistent associated dry cough, occurring intermittently, slowly improving.  She is having persistent intermittent fever.  She reports fatigue.  No myalgias.  No hemoptysis.  No headache.  No bleeding.  No nausea or vomiting.  No diarrhea.    Objective:   Physical Exam  Vitals reviewed.  General:  Morbidly obese, nontoxic, nondiaphoretic, comfortable appearing  Head and eyes:  Anicteric sclerae, no conjunctival discharge,  PERRLA  ENT:  Moist mucous membranes, no thrush  Pulmonary:  Comfortable work of breathing at rest, nasal cannula oxygen in place, occasional dry cough, fine crackles left lung base, no wheezing  GI:  Abdomen is obese, soft and nontender  Cardiovascular:  2+ radial pulses bilaterally, regular rate and rhythm, extremities warm and well perfused  Skin:  Dry and warm no jaundice  Psych:  Mood is okay, affect restricted, insight fair  Neuro:  Nonfocal motor exam, fluent speech, alert and oriented    Following labs were Reviewed   Recent Labs   Lab 03/17/20  0450   WBC 3.72*   HGB 11.2*   HCT 35.5*      CALCIUM 8.5*  8.5*   ALBUMIN 3.5   PROT 7.1     136   K 3.5  3.5   CO2 29  29     103   BUN 7  7   CREATININE 0.6  0.6   ALKPHOS 50*   ALT 29   AST 35   BILITOT 0.7     No results found for: POCTGLUCOSE     All labs within the past 24 hours have been reviewed  Microbiology Results (last 7 days)     Procedure Component Value Units Date/Time    Blood Culture #1 **CANNOT BE ORDERED STAT** [[id number]] Collected:  03/14/20 2125    Order Status:  Completed Specimen:  Blood from Peripheral, Hand, Right Updated:  03/17/20 0232     Blood Culture, Routine No Growth to date      No Growth to date      No Growth to date    Blood Culture #2 **CANNOT BE ORDERED STAT** [[id number]] Collected:  03/14/20 2240    Order Status:  Completed Specimen:  Blood from Peripheral, Forearm, Right Updated:  03/17/20 0232     Blood Culture, Routine No Growth to date      No Growth to date      No Growth to date    Blood culture x two cultures. Draw prior to antibiotics. [[id number]]     Order Status:  Canceled Specimen:  Blood     Blood culture x two cultures. Draw prior to antibiotics. [[id number]]     Order Status:  Canceled Specimen:  Blood         CTA Chest Non-Coronary (PE Study)   Final Result      X-Ray Chest AP Portable   Final Result          Inpatient medications  Scheduled Meds:   [START ON 3/18/2020] albuterol  2  puff Inhalation Q8H    enoxaparin  40 mg Subcutaneous Q12H    escitalopram oxalate  10 mg Oral Daily    guaiFENesin  1,200 mg Oral BID    hydroxychloroquine  200 mg Oral BID    insulin detemir U-100  10 Units Subcutaneous Daily    levoFLOXacin  500 mg Intravenous Q24H    oxymetazoline  2 spray Each Nostril BID    potassium chloride  40 mEq Oral Once     Continuous Infusions:   sodium chloride 0.9% 125 mL/hr at 03/15/20 0840     PRN Meds:.acetaminophen, butalbital-acetaminophen-caffeine -40 mg, dextrose 50%, dextrose 50%, glucagon (human recombinant), glucose, glucose, hydrALAZINE, ibuprofen, insulin aspart U-100, LORazepam, melatonin, morphine, ondansetron, sodium chloride, sodium chloride 0.9%    Above encounter included review of the medical records, interviewing and examining the patient face-to-face, discussion with family and other health care providers, ordering and interpreting lab/test results and formulating a plan of care.     Shaheen Ribera  [facility] Hospitalist  03/17/2020

## 2020-03-17 NOTE — PLAN OF CARE
03/17/20 0800   Patient Assessment/Suction   Level of Consciousness (AVPU) alert   Respiratory Effort Normal;Unlabored   Expansion/Accessory Muscles/Retractions no use of accessory muscles   All Lung Fields Breath Sounds diminished   PRE-TX-O2   O2 Device (Oxygen Therapy) nasal cannula   $ Is the patient on Low Flow Oxygen? Yes   Flow (L/min) 2   SpO2 97 %   Pulse Oximetry Type Intermittent   $ Pulse Oximetry - Multiple Charge Pulse Oximetry - Multiple   Pulse 107   Resp 18   Positioning   Body Position positioned/repositioned independently   Head of Bed (HOB) HOB at 20 degrees   Aerosol Therapy   $ Aerosol Therapy Charges PRN treatment not required   Daily Review of Necessity (SVN) completed   Respiratory Treatment Status (SVN) PRN treatment not required

## 2020-03-17 NOTE — PLAN OF CARE
03/17/20 1057   Discharge Assessment   Assessment Type Discharge Planning Assessment   Confirmed/corrected address and phone number on facesheet? Yes   Assessment information obtained from? Patient   Communicated expected length of stay with patient/caregiver yes   Prior to hospitilization cognitive status: Alert/Oriented   Prior to hospitalization functional status: Independent   Current cognitive status: Alert/Oriented   Current Functional Status: Independent   Facility Arrived From: home   Lives With spouse   Able to Return to Prior Arrangements yes   Is patient able to care for self after discharge? Yes   Who are your caregiver(s) and their phone number(s)? Mele Bennett [phone]   Patient's perception of discharge disposition home or selfcare   Readmission Within the Last 30 Days no previous admission in last 30 days   Patient currently being followed by outpatient case management? No   Patient currently receives any other outside agency services? No   Equipment Currently Used at Home none   Do you have any problems affording any of your prescribed medications? No   Is the patient taking medications as prescribed? yes   Does the patient have transportation home? Yes   Transportation Anticipated family or friend will provide   Dialysis Name and Scheduled days n/a   Does the patient receive services at the [facility]madin Clinic? No   Discharge Plan A Home with family   Discharge Plan B Home   DME Needed Upon Discharge  none   Patient/Family in Agreement with Plan yes     Introduced self to patient via the telephone and asked if ok to take a few min of their time for short interview for D/C planning and ok with patient

## 2020-03-17 NOTE — ED NOTES
[facility] called spoke with carlee states patient positive covid, called floor patient in 1112, spoke with nurse phipps and notified

## 2020-03-17 NOTE — RESPIRATORY THERAPY
03/16/20 1368   Patient Assessment/Suction   Level of Consciousness (AVPU) alert   Respiratory Effort Normal;Unlabored   Expansion/Accessory Muscles/Retractions expansion symmetric;no retractions;no use of accessory muscles   All Lung Fields Breath Sounds diminished   Rhythm/Pattern, Respiratory no shortness of breath reported;pattern regular;unlabored   PRE-TX-O2   O2 Device (Oxygen Therapy) nasal cannula   $ Is the patient on Low Flow Oxygen? Yes   Flow (L/min) 2   SpO2 96 %   Pulse Oximetry Type Intermittent   $ Pulse Oximetry - Multiple Charge Pulse Oximetry - Multiple   Pulse 82   Resp 16   Aerosol Therapy   $ Aerosol Therapy Charges PRN treatment not required   Daily Review of Necessity (SVN) completed   Respiratory Interventions   Cough And Deep Breathing done with encouragement   Breathing Techniques/Airway Clearance deep/controlled cough encouraged;diaphragmatic breathing promoted   Respiratory Evaluation   $ Care Plan Tech Time 15 min   Admitting Diagnosis SEPSIS,PNEUMONIA   Pulmonary Diagnosis PNEUMONIA   Home Oxygen   Has Home Oxygen? No   Home Aerosol, MDI, DPI, and Other Treatments/Therapies   Home Respiratory Therapy Per Patient/Review of Chart No

## 2020-03-18 LAB
1,25(OH)2D3 SERPL-MCNC: 89.3 PG/ML (ref 19.9–79.3)
ALBUMIN SERPL BCP-MCNC: 3 G/DL (ref 3.5–5.2)
ALP SERPL-CCNC: 43 U/L (ref 55–135)
ALT SERPL W/O P-5'-P-CCNC: 25 U/L (ref 10–44)
ANION GAP SERPL CALC-SCNC: 5 MMOL/L (ref 8–16)
ANION GAP SERPL CALC-SCNC: 5 MMOL/L (ref 8–16)
AST SERPL-CCNC: 29 U/L (ref 10–40)
BASOPHILS # BLD AUTO: 0 K/UL (ref 0–0.2)
BASOPHILS NFR BLD: 0 % (ref 0–1.9)
BILIRUB SERPL-MCNC: 0.8 MG/DL (ref 0.1–1)
BUN SERPL-MCNC: 7 MG/DL (ref 6–20)
BUN SERPL-MCNC: 7 MG/DL (ref 6–20)
CALCIUM SERPL-MCNC: 8.1 MG/DL (ref 8.7–10.5)
CALCIUM SERPL-MCNC: 8.1 MG/DL (ref 8.7–10.5)
CHLORIDE SERPL-SCNC: 107 MMOL/L (ref 95–110)
CHLORIDE SERPL-SCNC: 107 MMOL/L (ref 95–110)
CO2 SERPL-SCNC: 26 MMOL/L (ref 23–29)
CO2 SERPL-SCNC: 26 MMOL/L (ref 23–29)
CREAT SERPL-MCNC: 0.6 MG/DL (ref 0.5–1.4)
CREAT SERPL-MCNC: 0.6 MG/DL (ref 0.5–1.4)
DIFFERENTIAL METHOD: ABNORMAL
EOSINOPHIL # BLD AUTO: 0 K/UL (ref 0–0.5)
EOSINOPHIL NFR BLD: 0 % (ref 0–8)
ERYTHROCYTE [DISTWIDTH] IN BLOOD BY AUTOMATED COUNT: 13.5 % (ref 11.5–14.5)
EST. GFR  (AFRICAN AMERICAN): >60 ML/MIN/1.73 M^2
EST. GFR  (AFRICAN AMERICAN): >60 ML/MIN/1.73 M^2
EST. GFR  (NON AFRICAN AMERICAN): >60 ML/MIN/1.73 M^2
EST. GFR  (NON AFRICAN AMERICAN): >60 ML/MIN/1.73 M^2
G6PD BLD QN: 153 U/10E12 RBC (ref 146–376)
GLUCOSE SERPL-MCNC: 131 MG/DL (ref 70–110)
GLUCOSE SERPL-MCNC: 131 MG/DL (ref 70–110)
GLUCOSE SERPL-MCNC: 138 MG/DL (ref 70–110)
GLUCOSE SERPL-MCNC: 141 MG/DL (ref 70–110)
GLUCOSE SERPL-MCNC: 142 MG/DL (ref 70–110)
HCT VFR BLD AUTO: 32.1 % (ref 37–48.5)
HGB BLD-MCNC: 10.2 G/DL (ref 12–16)
IMM GRANULOCYTES # BLD AUTO: 0 K/UL (ref 0–0.04)
IMM GRANULOCYTES NFR BLD AUTO: 0 % (ref 0–0.5)
LABCORP MISC TEST CODE: NORMAL
LABCORP MISC TEST NAME: NORMAL
LABCORP MISCELLANEOUS TEST: NORMAL
LYMPHOCYTES # BLD AUTO: 1 K/UL (ref 1–4.8)
LYMPHOCYTES NFR BLD: 34.8 % (ref 18–48)
MAGNESIUM SERPL-MCNC: 1.8 MG/DL (ref 1.6–2.6)
MCH RBC QN AUTO: 26.1 PG (ref 27–31)
MCHC RBC AUTO-ENTMCNC: 31.8 G/DL (ref 32–36)
MCV RBC AUTO: 82 FL (ref 82–98)
MONOCYTES # BLD AUTO: 0.1 K/UL (ref 0.3–1)
MONOCYTES NFR BLD: 4.9 % (ref 4–15)
NEUTROPHILS # BLD AUTO: 1.7 K/UL (ref 1.8–7.7)
NEUTROPHILS NFR BLD: 60.3 % (ref 38–73)
NRBC BLD-RTO: 0 /100 WBC
PHOSPHATE SERPL-MCNC: 3.2 MG/DL (ref 2.7–4.5)
PLATELET # BLD AUTO: 167 K/UL (ref 150–350)
PMV BLD AUTO: 9.3 FL (ref 9.2–12.9)
POTASSIUM SERPL-SCNC: 3.7 MMOL/L (ref 3.5–5.1)
POTASSIUM SERPL-SCNC: 3.7 MMOL/L (ref 3.5–5.1)
PROT SERPL-MCNC: 6.2 G/DL (ref 6–8.4)
RBC # BLD AUTO: 3.85 X10E6/UL (ref 3.77–5.28)
RBC # BLD AUTO: 3.91 M/UL (ref 4–5.4)
SODIUM SERPL-SCNC: 138 MMOL/L (ref 136–145)
SODIUM SERPL-SCNC: 138 MMOL/L (ref 136–145)
WBC # BLD AUTO: 2.87 K/UL (ref 3.9–12.7)

## 2020-03-18 PROCEDURE — 12000002 HC ACUTE/MED SURGE SEMI-PRIVATE ROOM

## 2020-03-18 PROCEDURE — 36415 COLL VENOUS BLD VENIPUNCTURE: CPT

## 2020-03-18 PROCEDURE — 99900035 HC TECH TIME PER 15 MIN (STAT)

## 2020-03-18 PROCEDURE — 27000221 HC OXYGEN, UP TO 24 HOURS

## 2020-03-18 PROCEDURE — 25000003 PHARM REV CODE 250: Performed by: STUDENT IN AN ORGANIZED HEALTH CARE EDUCATION/TRAINING PROGRAM

## 2020-03-18 PROCEDURE — 94640 AIRWAY INHALATION TREATMENT: CPT

## 2020-03-18 PROCEDURE — 63600175 PHARM REV CODE 636 W HCPCS: Performed by: STUDENT IN AN ORGANIZED HEALTH CARE EDUCATION/TRAINING PROGRAM

## 2020-03-18 PROCEDURE — 25000003 PHARM REV CODE 250: Performed by: INTERNAL MEDICINE

## 2020-03-18 PROCEDURE — 82962 GLUCOSE BLOOD TEST: CPT

## 2020-03-18 PROCEDURE — 80053 COMPREHEN METABOLIC PANEL: CPT

## 2020-03-18 PROCEDURE — 85025 COMPLETE CBC W/AUTO DIFF WBC: CPT

## 2020-03-18 PROCEDURE — 94761 N-INVAS EAR/PLS OXIMETRY MLT: CPT

## 2020-03-18 PROCEDURE — 25000242 PHARM REV CODE 250 ALT 637 W/ HCPCS: Performed by: INTERNAL MEDICINE

## 2020-03-18 PROCEDURE — 84100 ASSAY OF PHOSPHORUS: CPT

## 2020-03-18 PROCEDURE — 83735 ASSAY OF MAGNESIUM: CPT

## 2020-03-18 RX ORDER — IBUPROFEN 400 MG/1
800 TABLET ORAL ONCE
Status: COMPLETED | OUTPATIENT
Start: 2020-03-19 | End: 2020-03-19

## 2020-03-18 RX ADMIN — ACETAMINOPHEN 650 MG: 325 TABLET ORAL at 09:03

## 2020-03-18 RX ADMIN — Medication 2 SPRAY: at 09:03

## 2020-03-18 RX ADMIN — GUAIFENESIN 1200 MG: 600 TABLET, EXTENDED RELEASE ORAL at 08:03

## 2020-03-18 RX ADMIN — BUTALBITAL, ACETAMINOPHEN AND CAFFEINE 1 TABLET: 50; 325; 40 TABLET ORAL at 06:03

## 2020-03-18 RX ADMIN — ALBUTEROL SULFATE 2 PUFF: 90 AEROSOL, METERED RESPIRATORY (INHALATION) at 12:03

## 2020-03-18 RX ADMIN — ALBUTEROL SULFATE 2 PUFF: 90 AEROSOL, METERED RESPIRATORY (INHALATION) at 04:03

## 2020-03-18 RX ADMIN — ACETAMINOPHEN 650 MG: 325 TABLET ORAL at 12:03

## 2020-03-18 RX ADMIN — GUAIFENESIN 1200 MG: 600 TABLET, EXTENDED RELEASE ORAL at 09:03

## 2020-03-18 RX ADMIN — LORAZEPAM 0.5 MG: 0.5 TABLET ORAL at 09:03

## 2020-03-18 RX ADMIN — HYDROXYCHLOROQUINE SULFATE 200 MG: 200 TABLET, FILM COATED ORAL at 09:03

## 2020-03-18 RX ADMIN — ALBUTEROL SULFATE 2 PUFF: 90 AEROSOL, METERED RESPIRATORY (INHALATION) at 08:03

## 2020-03-18 RX ADMIN — ESCITALOPRAM OXALATE 10 MG: 10 TABLET ORAL at 08:03

## 2020-03-18 RX ADMIN — ENOXAPARIN SODIUM 40 MG: 100 INJECTION SUBCUTANEOUS at 08:03

## 2020-03-18 RX ADMIN — INSULIN DETEMIR 10 UNITS: 100 INJECTION, SOLUTION SUBCUTANEOUS at 09:03

## 2020-03-18 RX ADMIN — HYDROXYCHLOROQUINE SULFATE 200 MG: 200 TABLET, FILM COATED ORAL at 08:03

## 2020-03-18 RX ADMIN — LEVOFLOXACIN 500 MG: 5 INJECTION, SOLUTION INTRAVENOUS at 01:03

## 2020-03-18 RX ADMIN — ENOXAPARIN SODIUM 40 MG: 100 INJECTION SUBCUTANEOUS at 09:03

## 2020-03-18 NOTE — PROGRESS NOTES
[facility] Medicine   Progress Note  Patient Name: Patria Bennett MRN: [id number]   Patient Class: IP- Inpatient  Length of Stay: 4   Admission Date: 3/14/2020  7:46 PM Attending Physician:  Shaheen Ribera MD   Primary Care Provider: DINAH Leos Face-to-Face encounter date: 03/18/2020     Assessment & Plan:   Patria Bennett is a 49 y.o. female admitted for    Assessment:  Pneumonia secondary to SARS-CoV2  Hypokalemia  Hypomagnesemia  Anemia likely inflammatory secondary to pneumonia  Chronic mood disorder  Diabetes mellitus type 2    Plan:   Continue care.  Appreciate consultants.  I discussed the case with Infectious Disease Dr Alex.   Continue hydroxychloroquine.  Continue appropriate isolation measures.  Discontinue empiric Levaquin.  Continue MDI bronchodilator to reduce aerosolized respiratory droplets.  Continue mucolytic and antitussives.  Monitor electrolytes and replace as needed.  Repeat a.m. Labs.  Glucose stable.  Continue sliding scale insulin.  Monitor glucose and titrate regimen as needed.  Mobilize as able with PT/OT  VTE prophylaxis with Lovenox  High risk secondary to acute illness with risk to life (pneumonia due to COVID19)    Subjective:    Interval History:   Today the patient reports persistent shortness of breath that is slowly improving with medical treatment, mild intensity at rest, worsens with activity.  She has persistent dry cough that is also slowly improving, occurring intermittently.  She is having persistent intermittent fever with last episode 102.  She reports persistent mild fatigue.  No myalgias.  No hemoptysis.  No headache.  No nausea, vomiting, or diarrhea.    Objective:   Physical Exam  Vitals reviewed.  General:  Morbidly obese, nontoxic, nondiaphoretic, comfortable appearing  Head and eyes:  Anicteric sclerae, no conjunctival discharge, PERRLA  ENT:  Moist mucous membranes, no thrush  Pulmonary:  Comfortable work of breathing at  rest, nasal cannula oxygen in place, occasional dry cough, fine crackles bilateral lower lung zones L > R, no wheezing  GI:  Abdomen is obese, soft and nontender  Cardiovascular:  2+ radial pulses bilaterally, regular rate and rhythm, extremities warm and well perfused  Skin:  Dry and warm no jaundice  Psych:  Mood is okay, affect restricted, insight fair  Neuro:  Nonfocal motor exam, fluent speech, alert and oriented    Following labs were Reviewed   Recent Labs   Lab 03/18/20  0500   WBC 2.87*   HGB 10.2*   HCT 32.1*      CALCIUM 8.1*  8.1*   ALBUMIN 3.0*   PROT 6.2     138   K 3.7  3.7   CO2 26  26     107   BUN 7  7   CREATININE 0.6  0.6   ALKPHOS 43*   ALT 25   AST 29   BILITOT 0.8     No results found for: POCTGLUCOSE     All labs within the past 24 hours have been reviewed  Microbiology Results (last 7 days)     Procedure Component Value Units Date/Time    Blood Culture #2 **CANNOT BE ORDERED STAT** [[id number]] Collected:  03/14/20 2240    Order Status:  Completed Specimen:  Blood from Peripheral, Forearm, Right Updated:  03/18/20 0232     Blood Culture, Routine No Growth to date      No Growth to date      No Growth to date      No Growth to date    Blood Culture #1 **CANNOT BE ORDERED STAT** [[id number]] Collected:  03/14/20 2125    Order Status:  Completed Specimen:  Blood from Peripheral, Hand, Right Updated:  03/18/20 0232     Blood Culture, Routine No Growth to date      No Growth to date      No Growth to date      No Growth to date    Blood culture x two cultures. Draw prior to antibiotics. [[id number]]     Order Status:  Canceled Specimen:  Blood     Blood culture x two cultures. Draw prior to antibiotics. [[id number]]     Order Status:  Canceled Specimen:  Blood         CTA Chest Non-Coronary (PE Study)   Final Result      X-Ray Chest AP Portable   Final Result          Inpatient medications  Scheduled Meds:   albuterol  2 puff Inhalation Q8H    enoxaparin  40 mg  Subcutaneous Q12H    escitalopram oxalate  10 mg Oral Daily    guaiFENesin  1,200 mg Oral BID    hydroxychloroquine  200 mg Oral BID    insulin detemir U-100  10 Units Subcutaneous Daily    oxymetazoline  2 spray Each Nostril BID     Continuous Infusions:   sodium chloride 0.9% 50 mL/hr at 03/17/20 2106     PRN Meds:.acetaminophen, butalbital-acetaminophen-caffeine -40 mg, dextrose 50%, dextrose 50%, glucagon (human recombinant), glucose, glucose, hydrALAZINE, ibuprofen, insulin aspart U-100, LORazepam, melatonin, morphine, ondansetron, sodium chloride, sodium chloride 0.9%    Above encounter included review of the medical records, interviewing and examining the patient face-to-face, discussion with family and other health care providers, ordering and interpreting lab/test results and formulating a plan of care.     Shaheen Ribera  [facility] Hospitalist  03/18/2020

## 2020-03-18 NOTE — PLAN OF CARE
03/17/20 2036   PRE-TX-O2   O2 Device (Oxygen Therapy) room air   SpO2 95 %   Pulse 96   Resp (!) 22   Temp (!) 102 °F (38.9 °C)   /81

## 2020-03-18 NOTE — PLAN OF CARE
Problem: Adult Inpatient Plan of Care  Goal: Plan of Care Review  Outcome: Ongoing, Progressing  Goal: Patient-Specific Goal (Individualization)  Outcome: Ongoing, Progressing  Goal: Absence of Hospital-Acquired Illness or Injury  Outcome: Ongoing, Progressing  Goal: Optimal Comfort and Wellbeing  Outcome: Ongoing, Progressing  Goal: Readiness for Transition of Care  Outcome: Ongoing, Progressing  Goal: Rounds/Family Conference  Outcome: Ongoing, Progressing     Problem: Diabetes Comorbidity  Goal: Blood Glucose Level Within Desired Range  Outcome: Ongoing, Progressing     Problem: Fall Injury Risk  Goal: Absence of Fall and Fall-Related Injury  Outcome: Ongoing, Progressing     Problem: Infection  Goal: Infection Symptom Resolution  Outcome: Ongoing, Progressing

## 2020-03-18 NOTE — PLAN OF CARE
03/17/20 2336   Patient Assessment/Suction   Level of Consciousness (AVPU) alert   Respiratory Effort Unlabored   Expansion/Accessory Muscles/Retractions no use of accessory muscles   All Lung Fields Breath Sounds diminished   Rhythm/Pattern, Respiratory unlabored   Cough Frequency infrequent   Cough Type nonproductive   PRE-TX-O2   Pulse 90   Resp 18   Inhaler   $ Inhaler Charges MDI (Metered Dose Inahler) Treatment   Daily Review of Necessity (Inhaler) completed   Respiratory Treatment Status (Inhaler) given   Treatment Route (Inhaler) mouthpiece   Patient Position (Inhaler) HOB elevated   Post Treatment Assessment (Inhaler) breath sounds improved   Signs of Intolerance (Inhaler) none

## 2020-03-18 NOTE — PROGRESS NOTES
"[facility]  Adult Nutrition   Progress Note (Initial Assessment)     SUMMARY     Recommendations  Recommendation/Intervention: 1. Continue current diet as tolerated by pt 2.  to assist with meal choices daily   Goals: 1. Pt to meet at least 75% estimated needs via PO diet   Nutrition Goal Status: new  Communication of RD Recs: reviewed with RN    Dietitian Rounds Brief    Pt assessed 2' LOS. Tolerating diet per RN. No N/V noted. LBM 3/14. Noted + COVID-19. No needs voiced at this time per RN.     Reason for Assessment  Reason For Assessment: length of stay  Diagnosis: infection/sepsis, pulmonary disease  Relevant Medical History: DM, HTN, GERD    Nutrition Risk Screen  Nutrition Risk Screen: no indicators present     MST Score: 0  Have you recently lost weight without trying?: No  Weight loss score: 0  Have you been eating poorly because of a decreased appetite?: No  Appetite score: 0       Nutrition/Diet History  Spiritual, Cultural Beliefs, Sikhism Practices, Values that Affect Care: no  Food Allergies: NKFA  Factors Affecting Nutritional Intake: None identified at this time    Anthropometrics  Temp: 98.4 °F (36.9 °C)  Height Method: Measured  Height: 5' 9" (175.3 cm)  Height (inches): 69 in  Weight Method: Bed Scale  Weight: 131 kg (288 lb 12.8 oz)  Weight (lb): 288.81 lb  Ideal Body Weight (IBW), Female: 145 lb  % Ideal Body Weight, Female (lb): 199.18 %  BMI (Calculated): 42.6  BMI Grade: greater than 40 - morbid obesity       Weight History:  Wt Readings from Last 10 Encounters:   03/18/20 131 kg (288 lb 12.8 oz)   03/12/20 128.4 kg (283 lb)   08/26/19 128.8 kg (284 lb)   02/27/19 125.6 kg (277 lb)   12/30/09 (!) 139.5 kg (307 lb 10.4 oz)       Lab/Procedures/Meds: Pertinent Labs Reviewed  Clinical Chemistry:  Recent Labs   Lab 03/16/20  0512 03/17/20  0450 03/18/20  0500   *  134* 136  136 138  138   K 3.6  3.6 3.5  3.5 3.7  3.7   CL 97  97 103  103 107  107   CO2 26  " 26 29  29 26  26   *  158* 144*  144* 131*  131*   BUN 8  8 7  7 7  7   CREATININE 0.7  0.7 0.6  0.6 0.6  0.6   CALCIUM 8.3*  8.3* 8.5*  8.5* 8.1*  8.1*   PROT 6.5 7.1 6.2   ALBUMIN 3.2* 3.5 3.0*   BILITOT 0.7 0.7 0.8   ALKPHOS 44* 50* 43*   AST 29 35 29   ALT 29 29 25   ANIONGAP 11  11 4*  4* 5*  5*   ESTGFRAFRICA >60.0  >60.0 >60.0  >60.0 >60.0  >60.0   EGFRNONAA >60.0  >60.0 >60.0  >60.0 >60.0  >60.0   MG 1.5* 2.1 1.8   PHOS 2.8 3.1 3.2     CBC:   Recent Labs   Lab 03/18/20  0500   WBC 2.87*   RBC 3.91*   HGB 10.2*   HCT 32.1*      MCV 82   MCH 26.1*   MCHC 31.8*     Cardiac Profile:  Recent Labs   Lab 03/14/20  2111   BNP 19   TROPONINI <0.030     Diabetes:  Recent Labs   Lab 03/15/20  0430   HGBA1C 7.7*       Medications: Pertinent Medications reviewed  Scheduled Meds:   albuterol  2 puff Inhalation Q8H    enoxaparin  40 mg Subcutaneous Q12H    escitalopram oxalate  10 mg Oral Daily    guaiFENesin  1,200 mg Oral BID    hydroxychloroquine  200 mg Oral BID    insulin detemir U-100  10 Units Subcutaneous Daily    levoFLOXacin  500 mg Intravenous Q24H    oxymetazoline  2 spray Each Nostril BID     Continuous Infusions:   sodium chloride 0.9% 50 mL/hr at 03/17/20 2106     PRN Meds:.acetaminophen, butalbital-acetaminophen-caffeine -40 mg, dextrose 50%, dextrose 50%, glucagon (human recombinant), glucose, glucose, hydrALAZINE, ibuprofen, insulin aspart U-100, LORazepam, melatonin, morphine, ondansetron, sodium chloride, sodium chloride 0.9%    Estimated/Assessed Needs  Weight Used For Calorie Calculations: 131 kg (288 lb 12.8 oz)  Energy Calorie Requirements (kcal): 0839-2239 (15-20 kcal/kg)  Energy Need Method: Kcal/kg  Protein Requirements: 99 g (1.5 g/kg) per IBW  Weight Used For Protein Calculations: 66 kg (145 lb 8.1 oz)(IBW)     Estimated Fluid Requirement Method: RDA Method  RDA Method (mL): 1965       Nutrition Prescription Ordered  Current Diet Order: 2000  ADA     Evaluation of Received Nutrient/Fluid Intake  Energy Calories Required: meeting needs  Protein Required: meeting needs  Fluid Required: meeting needs  Tolerance: tolerating     Intake/Output Summary (Last 24 hours) at 3/18/2020 0936  Last data filed at 3/18/2020 0400  Gross per 24 hour   Intake 2950 ml   Output --   Net 2950 ml        % Meal Intake: 75 - 100 %    Nutrition Risk  Level of Risk/Frequency of Follow-up: moderate     Monitor and Evaluation  Food and Nutrient Intake: energy intake, food and beverage intake  Food and Nutrient Adminstration: diet order  Physical Activity and Function: nutrition-related ADLs and IADLs  Anthropometric Measurements: weight, weight change  Biochemical Data, Medical Tests and Procedures: electrolyte and renal panel, gastrointestinal profile, glucose/endocrine profile  Nutrition-Focused Physical Findings: overall appearance     Nutrition Follow-Up  RD Follow-up?: Yes    Damaris Campos RD 03/18/2020 9:37 AM

## 2020-03-19 LAB
ALBUMIN SERPL BCP-MCNC: 2.8 G/DL (ref 3.5–5.2)
ALP SERPL-CCNC: 46 U/L (ref 55–135)
ALT SERPL W/O P-5'-P-CCNC: 23 U/L (ref 10–44)
ANION GAP SERPL CALC-SCNC: 4 MMOL/L (ref 8–16)
ANION GAP SERPL CALC-SCNC: 4 MMOL/L (ref 8–16)
AST SERPL-CCNC: 28 U/L (ref 10–40)
BASOPHILS # BLD AUTO: 0.01 K/UL (ref 0–0.2)
BASOPHILS NFR BLD: 0.3 % (ref 0–1.9)
BILIRUB SERPL-MCNC: 0.8 MG/DL (ref 0.1–1)
BUN SERPL-MCNC: 8 MG/DL (ref 6–20)
BUN SERPL-MCNC: 8 MG/DL (ref 6–20)
CALCIUM SERPL-MCNC: 8 MG/DL (ref 8.7–10.5)
CALCIUM SERPL-MCNC: 8 MG/DL (ref 8.7–10.5)
CHLORIDE SERPL-SCNC: 105 MMOL/L (ref 95–110)
CHLORIDE SERPL-SCNC: 105 MMOL/L (ref 95–110)
CO2 SERPL-SCNC: 26 MMOL/L (ref 23–29)
CO2 SERPL-SCNC: 26 MMOL/L (ref 23–29)
CREAT SERPL-MCNC: 0.6 MG/DL (ref 0.5–1.4)
CREAT SERPL-MCNC: 0.6 MG/DL (ref 0.5–1.4)
DIFFERENTIAL METHOD: ABNORMAL
EOSINOPHIL # BLD AUTO: 0 K/UL (ref 0–0.5)
EOSINOPHIL NFR BLD: 0 % (ref 0–8)
ERYTHROCYTE [DISTWIDTH] IN BLOOD BY AUTOMATED COUNT: 13.5 % (ref 11.5–14.5)
EST. GFR  (AFRICAN AMERICAN): >60 ML/MIN/1.73 M^2
EST. GFR  (AFRICAN AMERICAN): >60 ML/MIN/1.73 M^2
EST. GFR  (NON AFRICAN AMERICAN): >60 ML/MIN/1.73 M^2
EST. GFR  (NON AFRICAN AMERICAN): >60 ML/MIN/1.73 M^2
GLUCOSE SERPL-MCNC: 113 MG/DL (ref 70–110)
GLUCOSE SERPL-MCNC: 123 MG/DL (ref 70–110)
GLUCOSE SERPL-MCNC: 139 MG/DL (ref 70–110)
GLUCOSE SERPL-MCNC: 139 MG/DL (ref 70–110)
HCT VFR BLD AUTO: 30 % (ref 37–48.5)
HGB BLD-MCNC: 9.4 G/DL (ref 12–16)
IMM GRANULOCYTES # BLD AUTO: 0.01 K/UL (ref 0–0.04)
IMM GRANULOCYTES NFR BLD AUTO: 0.3 % (ref 0–0.5)
LYMPHOCYTES # BLD AUTO: 1.1 K/UL (ref 1–4.8)
LYMPHOCYTES NFR BLD: 31.1 % (ref 18–48)
MAGNESIUM SERPL-MCNC: 1.9 MG/DL (ref 1.6–2.6)
MCH RBC QN AUTO: 25.8 PG (ref 27–31)
MCHC RBC AUTO-ENTMCNC: 31.3 G/DL (ref 32–36)
MCV RBC AUTO: 82 FL (ref 82–98)
MONOCYTES # BLD AUTO: 0.2 K/UL (ref 0.3–1)
MONOCYTES NFR BLD: 4.7 % (ref 4–15)
NEUTROPHILS # BLD AUTO: 2.2 K/UL (ref 1.8–7.7)
NEUTROPHILS NFR BLD: 63.6 % (ref 38–73)
NRBC BLD-RTO: 0 /100 WBC
PHOSPHATE SERPL-MCNC: 3.8 MG/DL (ref 2.7–4.5)
PLATELET # BLD AUTO: 184 K/UL (ref 150–350)
PMV BLD AUTO: 10 FL (ref 9.2–12.9)
POTASSIUM SERPL-SCNC: 3.5 MMOL/L (ref 3.5–5.1)
POTASSIUM SERPL-SCNC: 3.5 MMOL/L (ref 3.5–5.1)
PROCALCITONIN SERPL IA-MCNC: <0.05 NG/ML (ref 0–0.5)
PROT SERPL-MCNC: 6.3 G/DL (ref 6–8.4)
RBC # BLD AUTO: 3.64 M/UL (ref 4–5.4)
SODIUM SERPL-SCNC: 135 MMOL/L (ref 136–145)
SODIUM SERPL-SCNC: 135 MMOL/L (ref 136–145)
WBC # BLD AUTO: 3.41 K/UL (ref 3.9–12.7)

## 2020-03-19 PROCEDURE — 25000003 PHARM REV CODE 250: Performed by: INTERNAL MEDICINE

## 2020-03-19 PROCEDURE — 12000002 HC ACUTE/MED SURGE SEMI-PRIVATE ROOM

## 2020-03-19 PROCEDURE — 80053 COMPREHEN METABOLIC PANEL: CPT

## 2020-03-19 PROCEDURE — 84145 PROCALCITONIN (PCT): CPT

## 2020-03-19 PROCEDURE — 94761 N-INVAS EAR/PLS OXIMETRY MLT: CPT

## 2020-03-19 PROCEDURE — 27000221 HC OXYGEN, UP TO 24 HOURS

## 2020-03-19 PROCEDURE — 25000003 PHARM REV CODE 250: Performed by: STUDENT IN AN ORGANIZED HEALTH CARE EDUCATION/TRAINING PROGRAM

## 2020-03-19 PROCEDURE — 36415 COLL VENOUS BLD VENIPUNCTURE: CPT

## 2020-03-19 PROCEDURE — 85025 COMPLETE CBC W/AUTO DIFF WBC: CPT

## 2020-03-19 PROCEDURE — 94640 AIRWAY INHALATION TREATMENT: CPT

## 2020-03-19 PROCEDURE — 83735 ASSAY OF MAGNESIUM: CPT

## 2020-03-19 PROCEDURE — 63600175 PHARM REV CODE 636 W HCPCS: Performed by: STUDENT IN AN ORGANIZED HEALTH CARE EDUCATION/TRAINING PROGRAM

## 2020-03-19 PROCEDURE — 84100 ASSAY OF PHOSPHORUS: CPT

## 2020-03-19 RX ORDER — POTASSIUM CHLORIDE 20 MEQ/1
40 TABLET, EXTENDED RELEASE ORAL ONCE
Status: COMPLETED | OUTPATIENT
Start: 2020-03-19 | End: 2020-03-19

## 2020-03-19 RX ORDER — ACETAMINOPHEN 500 MG
1000 TABLET ORAL EVERY 6 HOURS PRN
Status: DISCONTINUED | OUTPATIENT
Start: 2020-03-19 | End: 2020-03-25 | Stop reason: HOSPADM

## 2020-03-19 RX ADMIN — IBUPROFEN 800 MG: 400 TABLET ORAL at 12:03

## 2020-03-19 RX ADMIN — POTASSIUM CHLORIDE 40 MEQ: 1500 TABLET, EXTENDED RELEASE ORAL at 03:03

## 2020-03-19 RX ADMIN — ENOXAPARIN SODIUM 40 MG: 100 INJECTION SUBCUTANEOUS at 09:03

## 2020-03-19 RX ADMIN — GUAIFENESIN 1200 MG: 600 TABLET, EXTENDED RELEASE ORAL at 09:03

## 2020-03-19 RX ADMIN — ACETAMINOPHEN 650 MG: 325 TABLET ORAL at 09:03

## 2020-03-19 RX ADMIN — ESCITALOPRAM OXALATE 10 MG: 10 TABLET ORAL at 09:03

## 2020-03-19 RX ADMIN — ALBUTEROL SULFATE 2 PUFF: 90 AEROSOL, METERED RESPIRATORY (INHALATION) at 12:03

## 2020-03-19 RX ADMIN — GUAIFENESIN 1200 MG: 600 TABLET, EXTENDED RELEASE ORAL at 08:03

## 2020-03-19 RX ADMIN — ONDANSETRON 8 MG: 4 TABLET, ORALLY DISINTEGRATING ORAL at 05:03

## 2020-03-19 RX ADMIN — ALBUTEROL SULFATE 2 PUFF: 90 AEROSOL, METERED RESPIRATORY (INHALATION) at 08:03

## 2020-03-19 RX ADMIN — HYDROXYCHLOROQUINE SULFATE 200 MG: 200 TABLET, FILM COATED ORAL at 09:03

## 2020-03-19 RX ADMIN — ALBUTEROL SULFATE 2 PUFF: 90 AEROSOL, METERED RESPIRATORY (INHALATION) at 01:03

## 2020-03-19 RX ADMIN — ENOXAPARIN SODIUM 40 MG: 100 INJECTION SUBCUTANEOUS at 08:03

## 2020-03-19 RX ADMIN — HYDROXYCHLOROQUINE SULFATE 200 MG: 200 TABLET, FILM COATED ORAL at 08:03

## 2020-03-19 RX ADMIN — ACETAMINOPHEN 650 MG: 325 TABLET ORAL at 08:03

## 2020-03-19 NOTE — PROGRESS NOTES
Progress Note  Infectious Disease    Reason for Consult:      HPI: Patria Bennett is a ill appearing 49 y.o. female  NIDDM, HTN. GERD came in complaining of cough, progressive to shortness of breath and fever, over the past 5 days.  It started on 03/10/2020.  She also had some sore throat and hurting all over.  She was seen at urgent care where she receives a steroid shot and Zithromax.  She felt better for happened a only to become progressively worse till she came to the hospital.  CT chest ruled out PE but found ground-glass opacities.  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  WBC was normal.  Procalcitonin is negative.   She was given broad antimicrobial coverage.  Zosyn, vancomycin, levofloxacin.    03/16/2020 she feels congested, nasal congestion, cough, phlegm is clear, fever as below.  She feels had headache which is mostly frontal and behind her eyes.  In general she feels improved.  She is still short of breath especially with movement.  She just received a breathing treatment which made her feel better.  She receives between 2 and 3 L of O2.  At this time she has taken the O2 of her face.  She gets short of breath when talking.    3/17: notes, labs, imaging reviewed. COVID 19 testing is pending as of 5:16 pm. Still febrile to 101+. 93% on 2 liters NC. Reports fatigue AGUILAR and malaise when she has a fever. She ambulates to the restroom without oxygen and without distress. She is eating some. No diarrhea. Minimal pink sputum. Notified after our visit, that she is positive for COVID 19(confirmed 9 pm, I did not notify patient yet)  3/18: still running fever, with malaise and headache. She is eating well, was able to tolerate taking a shower without oxygen. Near global headache. No  change in vision, mouth soreness, HSV, still has non productive cough, no chest pain, nausea, vomiting, diarrhea, dysuria, phlebitis or antibiotic intolerance.     EXAM & DIAGNOSTICS REVIEWED:   Vitals:     Temp:  [98.4 °F (36.9 °C)-102.1 °F (38.9 °C)]   Temp: 100 °F (37.8 °C) (03/18/20 1700)  Pulse: 80 (03/18/20 1700)  Resp: 18 (03/18/20 1700)  BP: 132/72 (03/18/20 1700)  SpO2: (!) 90 % (03/18/20 1700)    Intake/Output Summary (Last 24 hours) at 3/18/2020 2053  Last data filed at 3/18/2020 1700  Gross per 24 hour   Intake 2050 ml   Output --   Net 2050 ml       General:  In NAD. Alert and attentive, cooperative, uncomfortable from a headache.  Morbidly obese  Eyes:  Anicteric,   EOMI  ENT:  No ulcers, exudates, thrush, nares patent, dentition is normal.    Neck:  supple,    Lungs:  decreased BS, coarse, no wheezing or consolidation. Comfortable on RA at rest 90% but 96% with 2 liters  Heart:  RRR, no gallop/murmur/rub noted  Abd:  Soft, NT, ND, normal BS, no masses or organomegaly appreciated.  :  Voids   Musc:  Joints without effusion, swelling, erythema, synovitis, muscle wasting.   Skin:  No rashes. No palmar or plantar lesions. No subungual petechiae  Wound: No open wounds  Neuro:   Alert, attentive, speech fluent, face symmetric, moves all extremities, no focal weakness. Ambulatory  Psych:  Calm, cooperative   Lymphatic:        Extrem: No edema, erythema, phlebitis, cellulitis, warm and well perfused  VAD:  peripheral     Isolation:  Airborne/contact/droplet    Lines/Tubes/Drains:    General Labs reviewed:  Recent Labs   Lab 03/16/20  0512 03/17/20  0450 03/18/20  0500   WBC 3.79* 3.72* 2.87*   HGB 10.1* 11.2* 10.2*   HCT 31.8* 35.5* 32.1*    188 167       Recent Labs   Lab 03/16/20  0512 03/17/20  0450 03/18/20  0500   *  134* 136  136 138  138   K 3.6  3.6 3.5  3.5 3.7  3.7   CL 97  97 103  103 107  107   CO2 26  26 29  29 26  26   BUN 8  8 7  7 7  7   CREATININE 0.7  0.7 0.6   0.6 0.6  0.6   CALCIUM 8.3*  8.3* 8.5*  8.5* 8.1*  8.1*   PROT 6.5 7.1 6.2   BILITOT 0.7 0.7 0.8   ALKPHOS 44* 50* 43*   ALT 29 29 25   AST 29 35 29     COVID 19 positive      Micro:  Microbiology Results (last 7 days)     Procedure Component Value Units Date/Time    Blood Culture #2 **CANNOT BE ORDERED STAT** [[id number]] Collected:  03/14/20 2240    Order Status:  Completed Specimen:  Blood from Peripheral, Forearm, Right Updated:  03/18/20 0232     Blood Culture, Routine No Growth to date      No Growth to date      No Growth to date      No Growth to date    Blood Culture #1 **CANNOT BE ORDERED STAT** [[id number]] Collected:  03/14/20 2125    Order Status:  Completed Specimen:  Blood from Peripheral, Hand, Right Updated:  03/18/20 0232     Blood Culture, Routine No Growth to date      No Growth to date      No Growth to date      No Growth to date    Blood culture x two cultures. Draw prior to antibiotics. [[id number]]     Order Status:  Canceled Specimen:  Blood     Blood culture x two cultures. Draw prior to antibiotics. [[id number]]     Order Status:  Canceled Specimen:  Blood         Imaging Reviewed:  CT 1.  Mildly prominent pulmonary artery and its branches however no evidence of pulmonary embolism.  2. Bilateral minimal pleural effusions with passive collapse bilateral lower lobes.  3. Area of patchy groundglass opacity with patchy consolidation in bilateral  lower lobes worrisome for edema versus pneumonia. Followup after treatment  recommended to ensure resolution and exclude any pulmonary nodules. .  4. Subcentimeter-sized bilateral paratracheal, prevascular and subcarinal lymph nodes.    IMPRESSION  IMP  IMPRESSION:   COVID 19 PNEUMONIA  Mild hypoxemia, fever persists  History of diabetes, HTN, morbid obesity  HIV screen negative.    Recommendations:     Continue  hydroxychloroquine    If fever persists, or hypoxemia worsens, will add Prezcobix

## 2020-03-19 NOTE — PLAN OF CARE
03/19/20 0731   Patient Assessment/Suction   Level of Consciousness (AVPU) alert   Respiratory Effort Normal;Unlabored   Expansion/Accessory Muscles/Retractions no use of accessory muscles;no retractions;expansion symmetric   All Lung Fields Breath Sounds clear;diminished   Rhythm/Pattern, Respiratory pattern regular;depth regular;no shortness of breath reported   Cough Frequency infrequent   Cough Type good   PRE-TX-O2   O2 Device (Oxygen Therapy) nasal cannula   $ Is the patient on Low Flow Oxygen? Yes   Flow (L/min) 2   SpO2 97 %   Pulse Oximetry Type Intermittent   $ Pulse Oximetry - Multiple Charge Pulse Oximetry - Multiple   Pulse 78   Resp 17   Inhaler   $ Inhaler Charges MDI (Metered Dose Inahler) Treatment;Given With Spacer;Mouth rinsed post treatment   Daily Review of Necessity (Inhaler) completed   Respiratory Treatment Status (Inhaler) given   Treatment Route (Inhaler) mouthpiece   Patient Position (Inhaler) HOB elevated   Post Treatment Assessment (Inhaler) increased aeration   Signs of Intolerance (Inhaler) none   Breath Sounds Post-Respiratory Treatment   Throughout All Fields Post-Treatment All Fields   Throughout All Fields Post-Treatment aeration increased   Post-treatment Heart Rate (beats/min) 95   Post-treatment Resp Rate (breaths/min) 17

## 2020-03-19 NOTE — PROGRESS NOTES
[facility] Medicine   Progress Note  Patient Name: Patria Bennett MRN: [id number]   Patient Class: IP- Inpatient  Length of Stay: 5   Admission Date: 3/14/2020  7:46 PM Attending Physician:  Shaheen Ribera MD   Primary Care Provider: DINAH Leos Face-to-Face encounter date: 03/19/2020     Assessment & Plan:   Patria Bennett is a 49 y.o. female admitted for    Assessment:  Pneumonia secondary to SARS-CoV2  Hypokalemia  Hypomagnesemia  Anemia likely inflammatory secondary to SARS-CoV2  Chronic mood disorder  Diabetes mellitus type 2  Leukopenia likely inflammatorysecondary to SARS-CoV2    Plan:   Continue care.  Appreciate consultants.  I discussed the case with Infectious Disease Dr Alex.   Continue hydroxychloroquine.  Continue appropriate isolation measures.  Off empiric Levaquin.  Continue MDI bronchodilator to reduce aerosolized respiratory droplets.  Continue mucolytic and antitussives.  Replace potassium today..  Repeat a.m. Labs.  Glucose stable.  Continue sliding scale insulin.  Monitor glucose and titrate regimen as needed.  Mobilize as able around the room - she is not mobilizing much  VTE prophylaxis with Lovenox  High risk secondary to acute illness with risk to life (pneumonia due to COVID19)    Subjective:    Interval History:   Today the patient reports persistent shortness of breath, constant timing, moderate intensity, worsened slightly with activity.  She is still having fevers intermittently. She has not been mobilizing much.  She sat in the chair today.  Persistent cough with some sputum production intermittently.  Poor appetite but no nausea or vomiting.  No diarrhea.  No headache or bleeding.  She feels slightly better today.    Objective:   Physical Exam  Vitals reviewed.  General:  Morbidly obese, nontoxic, nondiaphoretic, comfortable appearing, sitting in chair with legs propped up on bed  Head and eyes:  Anicteric sclerae, no conjunctival discharge,  PERRLA  ENT:  Moist mucous membranes, no thrush  Pulmonary:  Comfortable work of breathing at rest, nasal cannula oxygen in place, fine basilar crackles not much changed  GI:  Abdomen is obese, soft and nontender  Cardiovascular:  2+ radial pulses bilaterally, regular rate and rhythm, extremities warm and well perfused  Skin:  Dry and warm no jaundice  Psych:  Mood is okay, affect normal, insight fair  Neuro:  Nonfocal motor exam, fluent speech, alert and oriented    Following labs were Reviewed   Recent Labs   Lab 03/19/20  0506   WBC 3.41*   HGB 9.4*   HCT 30.0*      CALCIUM 8.0*  8.0*   ALBUMIN 2.8*   PROT 6.3   *  135*   K 3.5  3.5   CO2 26  26     105   BUN 8  8   CREATININE 0.6  0.6   ALKPHOS 46*   ALT 23   AST 28   BILITOT 0.8     No results found for: POCTGLUCOSE     All labs within the past 24 hours have been reviewed  Microbiology Results (last 7 days)     Procedure Component Value Units Date/Time    Blood Culture #1 **CANNOT BE ORDERED STAT** [[id number]] Collected:  03/14/20 2125    Order Status:  Completed Specimen:  Blood from Peripheral, Hand, Right Updated:  03/19/20 0232     Blood Culture, Routine No Growth to date      No Growth to date      No Growth to date      No Growth to date      No Growth to date    Blood Culture #2 **CANNOT BE ORDERED STAT** [[id number]] Collected:  03/14/20 2240    Order Status:  Completed Specimen:  Blood from Peripheral, Forearm, Right Updated:  03/19/20 0232     Blood Culture, Routine No Growth to date      No Growth to date      No Growth to date      No Growth to date      No Growth to date    Blood culture x two cultures. Draw prior to antibiotics. [[id number]]     Order Status:  Canceled Specimen:  Blood     Blood culture x two cultures. Draw prior to antibiotics. [[id number]]     Order Status:  Canceled Specimen:  Blood         CTA Chest Non-Coronary (PE Study)   Final Result      X-Ray Chest AP Portable   Final Result          Inpatient  medications  Scheduled Meds:   albuterol  2 puff Inhalation Q8H    enoxaparin  40 mg Subcutaneous Q12H    escitalopram oxalate  10 mg Oral Daily    guaiFENesin  1,200 mg Oral BID    hydroxychloroquine  200 mg Oral BID    insulin detemir U-100  10 Units Subcutaneous Daily     Continuous Infusions:   sodium chloride 0.9% 50 mL/hr at 03/17/20 2106     PRN Meds:.acetaminophen, butalbital-acetaminophen-caffeine -40 mg, dextrose 50%, dextrose 50%, glucagon (human recombinant), glucose, glucose, hydrALAZINE, ibuprofen, insulin aspart U-100, LORazepam, melatonin, morphine, ondansetron, sodium chloride, sodium chloride 0.9%    Above encounter included review of the medical records, interviewing and examining the patient face-to-face, discussion with family and other health care providers, ordering and interpreting lab/test results and formulating a plan of care.     Shaheen Ribera  [facility] Hospitalist  03/19/2020

## 2020-03-20 LAB
ANION GAP SERPL CALC-SCNC: 8 MMOL/L (ref 8–16)
BACTERIA BLD CULT: NORMAL
BACTERIA BLD CULT: NORMAL
BASOPHILS # BLD AUTO: 0.01 K/UL (ref 0–0.2)
BASOPHILS NFR BLD: 0.2 % (ref 0–1.9)
BUN SERPL-MCNC: 7 MG/DL (ref 6–20)
CALCIUM SERPL-MCNC: 8.2 MG/DL (ref 8.7–10.5)
CHLORIDE SERPL-SCNC: 102 MMOL/L (ref 95–110)
CO2 SERPL-SCNC: 25 MMOL/L (ref 23–29)
CREAT SERPL-MCNC: 0.6 MG/DL (ref 0.5–1.4)
DIFFERENTIAL METHOD: ABNORMAL
EOSINOPHIL # BLD AUTO: 0 K/UL (ref 0–0.5)
EOSINOPHIL NFR BLD: 0 % (ref 0–8)
ERYTHROCYTE [DISTWIDTH] IN BLOOD BY AUTOMATED COUNT: 13.6 % (ref 11.5–14.5)
EST. GFR  (AFRICAN AMERICAN): >60 ML/MIN/1.73 M^2
EST. GFR  (NON AFRICAN AMERICAN): >60 ML/MIN/1.73 M^2
GLUCOSE SERPL-MCNC: 116 MG/DL (ref 70–110)
GLUCOSE SERPL-MCNC: 120 MG/DL (ref 70–110)
GLUCOSE SERPL-MCNC: 124 MG/DL (ref 70–110)
GLUCOSE SERPL-MCNC: 126 MG/DL (ref 70–110)
GLUCOSE SERPL-MCNC: 138 MG/DL (ref 70–110)
HCT VFR BLD AUTO: 30.6 % (ref 37–48.5)
HGB BLD-MCNC: 9.8 G/DL (ref 12–16)
IMM GRANULOCYTES # BLD AUTO: 0.02 K/UL (ref 0–0.04)
IMM GRANULOCYTES NFR BLD AUTO: 0.4 % (ref 0–0.5)
LYMPHOCYTES # BLD AUTO: 1.1 K/UL (ref 1–4.8)
LYMPHOCYTES NFR BLD: 18.9 % (ref 18–48)
MAGNESIUM SERPL-MCNC: 1.9 MG/DL (ref 1.6–2.6)
MCH RBC QN AUTO: 26.2 PG (ref 27–31)
MCHC RBC AUTO-ENTMCNC: 32 G/DL (ref 32–36)
MCV RBC AUTO: 82 FL (ref 82–98)
MONOCYTES # BLD AUTO: 0.3 K/UL (ref 0.3–1)
MONOCYTES NFR BLD: 5.6 % (ref 4–15)
NEUTROPHILS # BLD AUTO: 4.2 K/UL (ref 1.8–7.7)
NEUTROPHILS NFR BLD: 74.9 % (ref 38–73)
NRBC BLD-RTO: 0 /100 WBC
PLATELET # BLD AUTO: 208 K/UL (ref 150–350)
PMV BLD AUTO: 9.5 FL (ref 9.2–12.9)
POTASSIUM SERPL-SCNC: 3.4 MMOL/L (ref 3.5–5.1)
RBC # BLD AUTO: 3.74 M/UL (ref 4–5.4)
SODIUM SERPL-SCNC: 135 MMOL/L (ref 136–145)
TROPONIN I SERPL DL<=0.01 NG/ML-MCNC: <0.03 NG/ML
WBC # BLD AUTO: 5.57 K/UL (ref 3.9–12.7)

## 2020-03-20 PROCEDURE — 99900035 HC TECH TIME PER 15 MIN (STAT)

## 2020-03-20 PROCEDURE — 94761 N-INVAS EAR/PLS OXIMETRY MLT: CPT

## 2020-03-20 PROCEDURE — 27000221 HC OXYGEN, UP TO 24 HOURS

## 2020-03-20 PROCEDURE — 85025 COMPLETE CBC W/AUTO DIFF WBC: CPT

## 2020-03-20 PROCEDURE — 36415 COLL VENOUS BLD VENIPUNCTURE: CPT

## 2020-03-20 PROCEDURE — 25000003 PHARM REV CODE 250: Performed by: STUDENT IN AN ORGANIZED HEALTH CARE EDUCATION/TRAINING PROGRAM

## 2020-03-20 PROCEDURE — 63600175 PHARM REV CODE 636 W HCPCS: Performed by: INTERNAL MEDICINE

## 2020-03-20 PROCEDURE — 25000003 PHARM REV CODE 250: Performed by: INTERNAL MEDICINE

## 2020-03-20 PROCEDURE — 63600175 PHARM REV CODE 636 W HCPCS: Performed by: STUDENT IN AN ORGANIZED HEALTH CARE EDUCATION/TRAINING PROGRAM

## 2020-03-20 PROCEDURE — 84484 ASSAY OF TROPONIN QUANT: CPT

## 2020-03-20 PROCEDURE — 82962 GLUCOSE BLOOD TEST: CPT

## 2020-03-20 PROCEDURE — 94640 AIRWAY INHALATION TREATMENT: CPT

## 2020-03-20 PROCEDURE — 83735 ASSAY OF MAGNESIUM: CPT

## 2020-03-20 PROCEDURE — 80048 BASIC METABOLIC PNL TOTAL CA: CPT

## 2020-03-20 PROCEDURE — 12000002 HC ACUTE/MED SURGE SEMI-PRIVATE ROOM

## 2020-03-20 RX ORDER — DEXAMETHASONE SODIUM PHOSPHATE 4 MG/ML
8 INJECTION, SOLUTION INTRA-ARTICULAR; INTRALESIONAL; INTRAMUSCULAR; INTRAVENOUS; SOFT TISSUE
Status: DISCONTINUED | OUTPATIENT
Start: 2020-03-20 | End: 2020-03-25 | Stop reason: HOSPADM

## 2020-03-20 RX ORDER — POTASSIUM CHLORIDE 20 MEQ/1
40 TABLET, EXTENDED RELEASE ORAL EVERY 4 HOURS
Status: COMPLETED | OUTPATIENT
Start: 2020-03-20 | End: 2020-03-20

## 2020-03-20 RX ORDER — FUROSEMIDE 10 MG/ML
20 INJECTION INTRAMUSCULAR; INTRAVENOUS ONCE
Status: COMPLETED | OUTPATIENT
Start: 2020-03-20 | End: 2020-03-20

## 2020-03-20 RX ORDER — ALBUTEROL SULFATE 90 UG/1
2 AEROSOL, METERED RESPIRATORY (INHALATION) EVERY 8 HOURS
Status: DISCONTINUED | OUTPATIENT
Start: 2020-03-21 | End: 2020-03-23

## 2020-03-20 RX ADMIN — HYDROXYCHLOROQUINE SULFATE 200 MG: 200 TABLET, FILM COATED ORAL at 10:03

## 2020-03-20 RX ADMIN — POTASSIUM CHLORIDE 40 MEQ: 20 TABLET, EXTENDED RELEASE ORAL at 05:03

## 2020-03-20 RX ADMIN — GUAIFENESIN 1200 MG: 600 TABLET, EXTENDED RELEASE ORAL at 10:03

## 2020-03-20 RX ADMIN — ALBUTEROL SULFATE 2 PUFF: 90 AEROSOL, METERED RESPIRATORY (INHALATION) at 08:03

## 2020-03-20 RX ADMIN — ALBUTEROL SULFATE 2 PUFF: 90 AEROSOL, METERED RESPIRATORY (INHALATION) at 12:03

## 2020-03-20 RX ADMIN — INSULIN DETEMIR 10 UNITS: 100 INJECTION, SOLUTION SUBCUTANEOUS at 09:03

## 2020-03-20 RX ADMIN — ENOXAPARIN SODIUM 40 MG: 100 INJECTION SUBCUTANEOUS at 10:03

## 2020-03-20 RX ADMIN — ENOXAPARIN SODIUM 40 MG: 100 INJECTION SUBCUTANEOUS at 08:03

## 2020-03-20 RX ADMIN — GUAIFENESIN 1200 MG: 600 TABLET, EXTENDED RELEASE ORAL at 08:03

## 2020-03-20 RX ADMIN — HYDROXYCHLOROQUINE SULFATE 200 MG: 200 TABLET, FILM COATED ORAL at 08:03

## 2020-03-20 RX ADMIN — ALBUTEROL SULFATE 2 PUFF: 90 AEROSOL, METERED RESPIRATORY (INHALATION) at 03:03

## 2020-03-20 RX ADMIN — ESCITALOPRAM OXALATE 10 MG: 10 TABLET ORAL at 10:03

## 2020-03-20 RX ADMIN — POTASSIUM CHLORIDE 40 MEQ: 20 TABLET, EXTENDED RELEASE ORAL at 09:03

## 2020-03-20 RX ADMIN — FUROSEMIDE 20 MG: 10 INJECTION, SOLUTION INTRAMUSCULAR; INTRAVENOUS at 01:03

## 2020-03-20 NOTE — CARE UPDATE
03/20/20 0858   Patient Assessment/Suction   Level of Consciousness (AVPU) alert   Respiratory Effort Normal   Expansion/Accessory Muscles/Retractions no use of accessory muscles;no retractions   All Lung Fields Breath Sounds equal bilaterally;diminished   Rhythm/Pattern, Respiratory unlabored;pattern regular;depth regular   Cough Frequency infrequent   Cough Type nonproductive   PRE-TX-O2   O2 Device (Oxygen Therapy) room air   SpO2 (!) 80 %   Pulse Oximetry Type Continuous   $ Pulse Oximetry - Multiple Charge Pulse Oximetry - Multiple   Pulse 79   Resp 18   Positioning HOB elevated 45 degrees   Inhaler   $ Inhaler Charges MDI (Metered Dose Inahler) Treatment   Daily Review of Necessity (Inhaler) completed   Respiratory Treatment Status (Inhaler) given   Treatment Route (Inhaler) spacer/holding chamber   Patient Position (Inhaler) Magaña's   Post Treatment Assessment (Inhaler) breath sounds unchanged   Signs of Intolerance (Inhaler) none   Breath Sounds Post-Respiratory Treatment   Throughout All Fields Post-Treatment All Fields   Throughout All Fields Post-Treatment equal bilaterally;diminished   Post-treatment Heart Rate (beats/min) 81   Post-treatment Resp Rate (breaths/min) 16   Respiratory Evaluation   $ Care Plan Tech Time 15 min   placed pt. Back on 3 liters and pt. Sat. Returned to 92% on 32%. Asked pt. To keep O2 nasal cannula on.

## 2020-03-20 NOTE — PROGRESS NOTES
Progress Note  Infectious Disease    Reason for Consult:      HPI: Patria Bennett is a ill appearing 49 y.o. female  NIDDM, HTN. GERD came in complaining of cough, progressive to shortness of breath and fever, over the past 5 days.  It started on 03/10/2020.  She also had some sore throat and hurting all over.  She was seen at urgent care where she receives a steroid shot and Zithromax.  She felt better for happened a only to become progressively worse till she came to the hospital.  CT chest ruled out PE but found ground-glass opacities.  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  WBC was normal.  Procalcitonin is negative.   She was given broad antimicrobial coverage.  Zosyn, vancomycin, levofloxacin.    03/16/2020 she feels congested, nasal congestion, cough, phlegm is clear, fever as below.  She feels had headache which is mostly frontal and behind her eyes.  In general she feels improved.  She is still short of breath especially with movement.  She just received a breathing treatment which made her feel better.  She receives between 2 and 3 L of O2.  At this time she has taken the O2 of her face.  She gets short of breath when talking.    3/17: notes, labs, imaging reviewed. COVID 19 testing is pending as of 5:16 pm. Still febrile to 101+. 93% on 2 liters NC. Reports fatigue AGUILAR and malaise when she has a fever. She ambulates to the restroom without oxygen and without distress. She is eating some. No diarrhea. Minimal pink sputum. Notified after our visit, that she is positive for COVID 19(confirmed 9 pm, I did not notify patient yet)  3/18: still running fever, with malaise and headache. She is eating well, was able to tolerate taking a shower without oxygen. Near global headache. No  change in vision, mouth soreness, HSV, still has non productive cough, no chest pain, nausea, vomiting, diarrhea, dysuria, phlebitis or antibiotic intolerance.   3/19: temps were higher again this evening but less so all day. She sat up in the chair for several hours today, but this evening her temp is up, her RR is up and she feels unwell. She is not eating well today. She is moving to the restroom but room size and isolation hamper her activity. No other new positive on ROS    EXAM & DIAGNOSTICS REVIEWED:   Vitals:     Temp:  [99.2 °F (37.3 °C)-102.1 °F (38.9 °C)]   Temp: (!) 100.8 °F (38.2 °C) (03/19/20 2035)  Pulse: 104 (03/19/20 1958)  Resp: 18 (03/19/20 1958)  BP: 137/81 (03/19/20 1958)  SpO2: (!) 92 % (03/19/20 1958)    Intake/Output Summary (Last 24 hours) at 3/19/2020 2115  Last data filed at 3/19/2020 1700  Gross per 24 hour   Intake 600 ml   Output --   Net 600 ml       General:  In NAD. Arousable and attentive, cooperative, somewhat withdrawn.   Morbidly obese  Eyes:  Anicteric,   EOMI  ENT:  No ulcers, exudates, thrush, nares patent, dentition is normal.    Neck:  supple,    Lungs:  LLL crackles but exam is always difficult due to body habitus. Sat 92% on 2 liters with  and temp 100.5  Heart:  RRR, no gallop/murmur/rub noted  Abd:  Soft, NT, ND, normal BS, no masses or organomegaly appreciated.  :  Voids   Musc:  Joints without effusion, swelling, erythema, synovitis, muscle wasting.   Skin:  No rashes.   Wound:    Neuro:   arousable,  attentive, speech fluent, face symmetric, moves all extremities, no focal weakness. Ambulatory  Psych:  Calm, cooperative , miserable with fever  Lymphatic:        Extrem: No edema, erythema, phlebitis, cellulitis, warm and well perfused  VAD:  peripheral     Isolation:  Airborne/contact/droplet    Lines/Tubes/Drains:    General Labs reviewed:  Recent Labs   Lab 03/17/20  0450 03/18/20  0500 03/19/20  0506   WBC 3.72* 2.87* 3.41*   HGB 11.2* 10.2* 9.4*   HCT 35.5*  32.1* 30.0*    167 184       Recent Labs   Lab 03/17/20  0450 03/18/20  0500 03/19/20  0506     136 138  138 135*  135*   K 3.5  3.5 3.7  3.7 3.5  3.5     103 107  107 105  105   CO2 29  29 26  26 26  26   BUN 7  7 7  7 8  8   CREATININE 0.6  0.6 0.6  0.6 0.6  0.6   CALCIUM 8.5*  8.5* 8.1*  8.1* 8.0*  8.0*   PROT 7.1 6.2 6.3   BILITOT 0.7 0.8 0.8   ALKPHOS 50* 43* 46*   ALT 29 25 23   AST 35 29 28     COVID 19 positive      Micro:  Microbiology Results (last 7 days)     Procedure Component Value Units Date/Time    Blood Culture #1 **CANNOT BE ORDERED STAT** [[id number]] Collected:  03/14/20 2125    Order Status:  Completed Specimen:  Blood from Peripheral, Hand, Right Updated:  03/19/20 0232     Blood Culture, Routine No Growth to date      No Growth to date      No Growth to date      No Growth to date      No Growth to date    Blood Culture #2 **CANNOT BE ORDERED STAT** [[id number]] Collected:  03/14/20 2240    Order Status:  Completed Specimen:  Blood from Peripheral, Forearm, Right Updated:  03/19/20 0232     Blood Culture, Routine No Growth to date      No Growth to date      No Growth to date      No Growth to date      No Growth to date    Blood culture x two cultures. Draw prior to antibiotics. [[id number]]     Order Status:  Canceled Specimen:  Blood     Blood culture x two cultures. Draw prior to antibiotics. [[id number]]     Order Status:  Canceled Specimen:  Blood         Imaging Reviewed:  CT 1.  Mildly prominent pulmonary artery and its branches however no evidence of pulmonary embolism.  2. Bilateral minimal pleural effusions with passive collapse bilateral lower lobes.  3. Area of patchy groundglass opacity with patchy consolidation in bilateral  lower lobes worrisome for edema versus pneumonia. Followup after treatment  recommended to ensure resolution and exclude any pulmonary nodules. .  4. Subcentimeter-sized bilateral paratracheal, prevascular and subcarinal  lymph nodes.    IMPRESSION  IMP  IMPRESSION:   COVID 19 PNEUMONIA  Mild -moderate hypoxemia, fever persists  History of diabetes, HTN, morbid obesity  HIV screen negative.    Recommendations:  Check CXR(done) which may be just crowded from position or may be a bit wet  Stop IVF, stop NSAIDs, check BNP and troponin. May need diuretic  Continue  hydroxychloroquine     d/w Dr. Ribera

## 2020-03-20 NOTE — PROGRESS NOTES
[facility] Medicine   Progress Note  Patient Name: Patria Bennett MRN: [id number]   Patient Class: IP- Inpatient  Length of Stay: 6   Admission Date: 3/14/2020  7:46 PM Attending Physician:  Shaheen Ribera MD   Primary Care Provider: DINAH Leos Face-to-Face encounter date: 03/20/2020     Assessment & Plan:   Patria Bennett is a 49 y.o. female admitted for    Assessment:  Pneumonia secondary to SARS-CoV2  Hypokalemia  Hypomagnesemia  Anemia likely inflammatory secondary to SARS-CoV2  Chronic mood disorder  Diabetes mellitus type 2  Leukopenia likely inflammatorysecondary to SARS-CoV2    Plan:   Continue care.  Appreciate consultants.  I discussed the case with Infectious Disease Dr Alex.   IV Lasix 20 mg x1 dose today.  Monitor urine output and fluid status.  Likely some mild iatrogenic volume overload.  IV fluids stopped yesterday.   Continue hydroxychloroquine.  Continue appropriate isolation measures. Off empiric Levaquin.  Continue MDI bronchodilator to reduce aerosolized respiratory droplets.  Continue mucolytic and antitussives.  Replace potassium today 40 mEq p.o. x2 doses.  Repeat a.m. Labs.  Glucose stable.  Continue sliding scale insulin.  Monitor glucose and titrate regimen as needed.  Mobilize as able around the room  VTE prophylaxis with Lovenox  High risk secondary to acute illness with risk to life (pneumonia due to COVID19)    Subjective:    Interval History:   Today the patient reports some improvement in shortness of breath overnight, persistent to moderate intensity, constant timing, worse with exertion but slightly less so than previous.  She is ambulating to the bathroom better although still with some worsening exertional dyspnea.  No fever episode today.  She has not been mobilizing much.  Persistent cough with some sputum production intermittently, slowly improving.  Poor appetite but no nausea or vomiting.  No diarrhea.  Unfinished [employer] chicken  nuggets at bedside.    Objective:   Physical Exam  Vitals reviewed.  General:  Morbidly obese, nontoxic, nondiaphoretic, comfortable appearing, sitting up on side of bed  Head and eyes:  Anicteric sclerae, no conjunctival discharge  ENT:  Moist mucous membranes  Pulmonary:  Comfortable work of breathing at rest, nasal cannula oxygen in place, fine basilar crackles unchanged  GI:  Abdomen is obese, soft and nontender  Cardiovascular:  2+ radial pulses bilaterally, regular rate and rhythm, extremities warm and well perfused  Skin:  Dry and warm no jaundice  Psych:  Mood is improved, affect normal, insight normal  Neuro:  Nonfocal motor exam, fluent speech, alert and oriented    Following labs were Reviewed   Recent Labs   Lab 03/20/20  0506   WBC 5.57   HGB 9.8*   HCT 30.6*      CALCIUM 8.2*   *   K 3.4*   CO2 25      BUN 7   CREATININE 0.6     No results found for: POCTGLUCOSE     All labs within the past 24 hours have been reviewed  Microbiology Results (last 7 days)     Procedure Component Value Units Date/Time    Blood Culture #2 **CANNOT BE ORDERED STAT** [[id number]] Collected:  03/14/20 2240    Order Status:  Completed Specimen:  Blood from Peripheral, Forearm, Right Updated:  03/20/20 0232     Blood Culture, Routine No growth after 5 days.    Blood Culture #1 **CANNOT BE ORDERED STAT** [[id number]] Collected:  03/14/20 2125    Order Status:  Completed Specimen:  Blood from Peripheral, Hand, Right Updated:  03/20/20 0232     Blood Culture, Routine No growth after 5 days.    Blood culture x two cultures. Draw prior to antibiotics. [[id number]]     Order Status:  Canceled Specimen:  Blood     Blood culture x two cultures. Draw prior to antibiotics. [[id number]]     Order Status:  Canceled Specimen:  Blood         Chest x-ray personally reviewed today:  Slight worsening of bilateral patchy infiltrates    Inpatient medications  Scheduled Meds:   albuterol  2 puff Inhalation Q8H    enoxaparin  40 mg  Subcutaneous Q12H    escitalopram oxalate  10 mg Oral Daily    guaiFENesin  1,200 mg Oral BID    hydroxychloroquine  200 mg Oral BID    insulin detemir U-100  10 Units Subcutaneous Daily    potassium chloride  40 mEq Oral Q4H     Continuous Infusions:    PRN Meds:.acetaminophen, butalbital-acetaminophen-caffeine -40 mg, dextrose 50%, dextrose 50%, glucagon (human recombinant), glucose, glucose, hydrALAZINE, insulin aspart U-100, LORazepam, melatonin, morphine, ondansetron, sodium chloride, sodium chloride 0.9%    Above encounter included review of the medical records, interviewing and examining the patient face-to-face, discussion with family and other health care providers, ordering and interpreting lab/test results and formulating a plan of care.     Shaheen Ribera  [facility] Hospitalist  03/20/2020

## 2020-03-21 LAB
ANION GAP SERPL CALC-SCNC: 7 MMOL/L (ref 8–16)
BASOPHILS # BLD AUTO: 0.01 K/UL (ref 0–0.2)
BASOPHILS NFR BLD: 0.2 % (ref 0–1.9)
BUN SERPL-MCNC: 7 MG/DL (ref 6–20)
CALCIUM SERPL-MCNC: 8.6 MG/DL (ref 8.7–10.5)
CHLORIDE SERPL-SCNC: 101 MMOL/L (ref 95–110)
CO2 SERPL-SCNC: 28 MMOL/L (ref 23–29)
CREAT SERPL-MCNC: 0.5 MG/DL (ref 0.5–1.4)
DIFFERENTIAL METHOD: ABNORMAL
EOSINOPHIL # BLD AUTO: 0 K/UL (ref 0–0.5)
EOSINOPHIL NFR BLD: 0.4 % (ref 0–8)
ERYTHROCYTE [DISTWIDTH] IN BLOOD BY AUTOMATED COUNT: 13.6 % (ref 11.5–14.5)
EST. GFR  (AFRICAN AMERICAN): >60 ML/MIN/1.73 M^2
EST. GFR  (NON AFRICAN AMERICAN): >60 ML/MIN/1.73 M^2
GLUCOSE SERPL-MCNC: 103 MG/DL (ref 70–110)
GLUCOSE SERPL-MCNC: 110 MG/DL (ref 70–110)
GLUCOSE SERPL-MCNC: 116 MG/DL (ref 70–110)
GLUCOSE SERPL-MCNC: 138 MG/DL (ref 70–110)
GLUCOSE SERPL-MCNC: 160 MG/DL (ref 70–110)
HCT VFR BLD AUTO: 32 % (ref 37–48.5)
HGB BLD-MCNC: 10.1 G/DL (ref 12–16)
IMM GRANULOCYTES # BLD AUTO: 0.05 K/UL (ref 0–0.04)
IMM GRANULOCYTES NFR BLD AUTO: 1 % (ref 0–0.5)
LYMPHOCYTES # BLD AUTO: 1.1 K/UL (ref 1–4.8)
LYMPHOCYTES NFR BLD: 21.5 % (ref 18–48)
MAGNESIUM SERPL-MCNC: 2 MG/DL (ref 1.6–2.6)
MCH RBC QN AUTO: 25.8 PG (ref 27–31)
MCHC RBC AUTO-ENTMCNC: 31.6 G/DL (ref 32–36)
MCV RBC AUTO: 82 FL (ref 82–98)
MONOCYTES # BLD AUTO: 0.4 K/UL (ref 0.3–1)
MONOCYTES NFR BLD: 7.5 % (ref 4–15)
NEUTROPHILS # BLD AUTO: 3.6 K/UL (ref 1.8–7.7)
NEUTROPHILS NFR BLD: 69.4 % (ref 38–73)
NRBC BLD-RTO: 0 /100 WBC
PLATELET # BLD AUTO: 240 K/UL (ref 150–350)
PMV BLD AUTO: 9.2 FL (ref 9.2–12.9)
POTASSIUM SERPL-SCNC: 3.8 MMOL/L (ref 3.5–5.1)
RBC # BLD AUTO: 3.92 M/UL (ref 4–5.4)
SODIUM SERPL-SCNC: 136 MMOL/L (ref 136–145)
WBC # BLD AUTO: 5.17 K/UL (ref 3.9–12.7)

## 2020-03-21 PROCEDURE — 85025 COMPLETE CBC W/AUTO DIFF WBC: CPT

## 2020-03-21 PROCEDURE — 25000003 PHARM REV CODE 250: Performed by: STUDENT IN AN ORGANIZED HEALTH CARE EDUCATION/TRAINING PROGRAM

## 2020-03-21 PROCEDURE — 80048 BASIC METABOLIC PNL TOTAL CA: CPT

## 2020-03-21 PROCEDURE — 94640 AIRWAY INHALATION TREATMENT: CPT

## 2020-03-21 PROCEDURE — 94664 DEMO&/EVAL PT USE INHALER: CPT

## 2020-03-21 PROCEDURE — 83735 ASSAY OF MAGNESIUM: CPT

## 2020-03-21 PROCEDURE — 82962 GLUCOSE BLOOD TEST: CPT

## 2020-03-21 PROCEDURE — 63600175 PHARM REV CODE 636 W HCPCS: Performed by: STUDENT IN AN ORGANIZED HEALTH CARE EDUCATION/TRAINING PROGRAM

## 2020-03-21 PROCEDURE — 94761 N-INVAS EAR/PLS OXIMETRY MLT: CPT

## 2020-03-21 PROCEDURE — 25000003 PHARM REV CODE 250: Performed by: INTERNAL MEDICINE

## 2020-03-21 PROCEDURE — 63600175 PHARM REV CODE 636 W HCPCS: Performed by: INTERNAL MEDICINE

## 2020-03-21 PROCEDURE — 27000221 HC OXYGEN, UP TO 24 HOURS

## 2020-03-21 PROCEDURE — 36415 COLL VENOUS BLD VENIPUNCTURE: CPT

## 2020-03-21 PROCEDURE — 99900035 HC TECH TIME PER 15 MIN (STAT)

## 2020-03-21 PROCEDURE — 12000002 HC ACUTE/MED SURGE SEMI-PRIVATE ROOM

## 2020-03-21 RX ORDER — FUROSEMIDE 10 MG/ML
40 INJECTION INTRAMUSCULAR; INTRAVENOUS ONCE
Status: COMPLETED | OUTPATIENT
Start: 2020-03-21 | End: 2020-03-21

## 2020-03-21 RX ORDER — POTASSIUM CHLORIDE 20 MEQ/1
40 TABLET, EXTENDED RELEASE ORAL ONCE
Status: COMPLETED | OUTPATIENT
Start: 2020-03-21 | End: 2020-03-21

## 2020-03-21 RX ADMIN — FUROSEMIDE 40 MG: 10 INJECTION, SOLUTION INTRAMUSCULAR; INTRAVENOUS at 11:03

## 2020-03-21 RX ADMIN — ESCITALOPRAM OXALATE 10 MG: 10 TABLET ORAL at 08:03

## 2020-03-21 RX ADMIN — HYDROXYCHLOROQUINE SULFATE 200 MG: 200 TABLET, FILM COATED ORAL at 09:03

## 2020-03-21 RX ADMIN — ALBUTEROL SULFATE 2 PUFF: 90 AEROSOL, METERED RESPIRATORY (INHALATION) at 02:03

## 2020-03-21 RX ADMIN — ALBUTEROL SULFATE 2 PUFF: 90 AEROSOL, METERED RESPIRATORY (INHALATION) at 12:03

## 2020-03-21 RX ADMIN — POTASSIUM CHLORIDE 40 MEQ: 20 TABLET, EXTENDED RELEASE ORAL at 11:03

## 2020-03-21 RX ADMIN — ALBUTEROL SULFATE 2 PUFF: 90 AEROSOL, METERED RESPIRATORY (INHALATION) at 08:03

## 2020-03-21 RX ADMIN — ENOXAPARIN SODIUM 40 MG: 100 INJECTION SUBCUTANEOUS at 09:03

## 2020-03-21 RX ADMIN — INSULIN DETEMIR 10 UNITS: 100 INJECTION, SOLUTION SUBCUTANEOUS at 09:03

## 2020-03-21 RX ADMIN — HYDROXYCHLOROQUINE SULFATE 200 MG: 200 TABLET, FILM COATED ORAL at 08:03

## 2020-03-21 RX ADMIN — ENOXAPARIN SODIUM 40 MG: 100 INJECTION SUBCUTANEOUS at 08:03

## 2020-03-21 NOTE — NURSING
Patient's O2 sat ranged between 88-95 this shift.   Mid morning, O2 sat was 88-89 on 3L NC.  Changed from NC to face mask due to patient being a mouth breather. Sats improved to the 90's.     End of shift, patient wanted to retry NC. Changed her back to NC on 3LPM. Sat's maintained 90's (92-95%)

## 2020-03-21 NOTE — PLAN OF CARE
Problem: Adult Inpatient Plan of Care  Goal: Plan of Care Review  Outcome: Ongoing, Progressing  Goal: Patient-Specific Goal (Individualization)  Outcome: Ongoing, Progressing  Goal: Absence of Hospital-Acquired Illness or Injury  Outcome: Ongoing, Progressing  Goal: Optimal Comfort and Wellbeing  Outcome: Ongoing, Progressing  Goal: Readiness for Transition of Care  Outcome: Ongoing, Progressing  Goal: Rounds/Family Conference  Outcome: Ongoing, Progressing     Problem: Diabetes Comorbidity  Goal: Blood Glucose Level Within Desired Range  Outcome: Ongoing, Progressing     Problem: Fall Injury Risk  Goal: Absence of Fall and Fall-Related Injury  Outcome: Ongoing, Progressing     Problem: Infection  Goal: Infection Symptom Resolution  Outcome: Ongoing, Progressing     Problem: Constipation  Goal: Effective Bowel Elimination  Outcome: Ongoing, Progressing     Problem: Balance Impairment (Functional Deficit)  Goal: Improved Balance and Postural Control  Outcome: Ongoing, Progressing     Problem: Coordination Impairment (Functional Deficit)  Goal: Optimal Coordination  Outcome: Ongoing, Progressing     Problem: Muscle Strength Impairment  Goal: Improved Muscle Strength  Outcome: Ongoing, Progressing

## 2020-03-21 NOTE — PLAN OF CARE
03/21/20 0855   Patient Assessment/Suction   Level of Consciousness (AVPU) alert   Respiratory Effort Normal;Unlabored   Expansion/Accessory Muscles/Retractions no use of accessory muscles;no retractions   All Lung Fields Breath Sounds diminished   PRE-TX-O2   O2 Device (Oxygen Therapy) nasal cannula   $ Is the patient on Low Flow Oxygen? Yes   Flow (L/min) 3   SpO2 (!) 94 %   Pulse Oximetry Type Intermittent   $ Pulse Oximetry - Multiple Charge Pulse Oximetry - Multiple   Pulse 84   Resp 18   Inhaler   $ Inhaler Charges MDI (Metered Dose Inahler) Treatment;Mouth rinsed post treatment   Daily Review of Necessity (Inhaler) completed   Respiratory Treatment Status (Inhaler) given   Treatment Route (Inhaler) mouthpiece   Patient Position (Inhaler) HOB elevated   Post Treatment Assessment (Inhaler) breath sounds unchanged   Signs of Intolerance (Inhaler) none   Vibratory PEP Therapy   $ Vibratory PEP Charges Acapella Therapy   $ Vibratory PEP Equipment Aerobika Equipment   $ Vibratory PEP Tech Time Charges 15 min   Daily Review of Necessity (PEP Therapy) completed   Type (PEP Therapy) vibratory/oscillatory   Device (PEP Therapy) flutter   Route (PEP Therapy) mouthpiece   Breaths per Cycle (PEP Therapy) 10   Cycles (PEP Therapy) 2   Post Treatment Assessment (PEP) breath sounds unchanged   Signs of Intolerance (PEP Therapy) none   Respiratory Evaluation   $ Care Plan Tech Time 15 min   Evaluation For   (care plan)

## 2020-03-21 NOTE — PLAN OF CARE
Requires o2 to maintain Spo2 above 89%. Encourage pt to cough anddeep breath often, use incentive spirometer. Hydration encouraged. Call out for assist to bscc, generalized weakness noted.

## 2020-03-21 NOTE — PLAN OF CARE
Pt requires standby assist, sob on minimal exertion. Requires oxygen at all times, and desats to 86 without while getting up to bs. Lungs diminished in lower lobes, encouraged to sip on water anddeep breath and cough every hour. IS given to pt and instructions given as pt request, up to 500 noted

## 2020-03-21 NOTE — PROGRESS NOTES
[facility] Medicine   Progress Note  Patient Name: Patria Bennett MRN: [id number]   Patient Class: IP- Inpatient  Length of Stay: 7   Admission Date: 3/14/2020  7:46 PM Attending Physician:  Shaheen Ribera MD   Primary Care Provider: DINAH Leos Face-to-Face encounter date: 03/21/2020     Assessment & Plan:   Patria Bennett is a 49 y.o. female admitted for    Assessment:  Pneumonia secondary to SARS-CoV2  Hypokalemia  Hypomagnesemia  Anemia likely inflammatory secondary to SARS-CoV2  Chronic mood disorder  Diabetes mellitus type 2  Leukopenia likely inflammatorysecondary to SARS-CoV2    Plan:   Continue care.  Appreciate consultants.    IV Lasix 40 mg x1 dose today.  Monitor urine output and fluid status.  Likely some mild iatrogenic volume overload.  Continue hydroxychloroquine.  Continue appropriate isolation measures. Off empiric Levaquin.  Continue MDI bronchodilator to reduce aerosolized respiratory droplets.  Continue mucolytic and antitussives.  Replace potassium today 40 mEq p.o. x1 doses.   Add chocolate Glucerna with meals  Repeat a.m. Labs.  Repeat BNP and chest x-ray.  Glucose stable.  Continue sliding scale insulin.  Monitor glucose and titrate regimen as needed.  Mobilize as able around the room  VTE prophylaxis with Lovenox  High risk secondary to acute illness with risk to life (pneumonia due to COVID19)    Subjective:    Interval History:   Today the patient reports no significant improvement in shortness of breath overnight, persistent to moderate intensity, constant timing, worse with exertion.  She is ambulating to the bathroom better although still with some worsening exertional dyspnea.  Fever 100.8 at 7:00 p.m. last night but No fever episode today.  Cough slowly improving.  Poor appetite but no nausea or vomiting.      Objective:   Physical Exam  Vitals reviewed.  General:  Morbidly obese, nontoxic, nondiaphoretic, comfortable appearing, sitting up on  side of bed  Head and eyes:  Anicteric sclerae, no conjunctival discharge  ENT:  Moist mucous membranes  Pulmonary:  Comfortable work of breathing at rest, nasal cannula oxygen in place, fine basilar crackles unchanged  GI:  Abdomen is obese, soft and nontender  Cardiovascular:  2+ radial pulses bilaterally, regular rate and rhythm, extremities warm and well perfused  Skin:  Dry and warm no jaundice  Psych:  Mood is improved, affect normal, insight normal  Neuro:  Nonfocal motor exam, fluent speech, alert and oriented    Following labs were Reviewed   Recent Labs   Lab 03/21/20  0644 03/21/20  0645   WBC  --  5.17   HGB  --  10.1*   HCT  --  32.0*   PLT  --  240   CALCIUM 8.6*  --      --    K 3.8  --    CO2 28  --      --    BUN 7  --    CREATININE 0.5  --      No results found for: POCTGLUCOSE     All labs within the past 24 hours have been reviewed  Microbiology Results (last 7 days)     Procedure Component Value Units Date/Time    Blood Culture #2 **CANNOT BE ORDERED STAT** [[id number]] Collected:  03/14/20 2240    Order Status:  Completed Specimen:  Blood from Peripheral, Forearm, Right Updated:  03/20/20 0232     Blood Culture, Routine No growth after 5 days.    Blood Culture #1 **CANNOT BE ORDERED STAT** [[id number]] Collected:  03/14/20 2125    Order Status:  Completed Specimen:  Blood from Peripheral, Hand, Right Updated:  03/20/20 0232     Blood Culture, Routine No growth after 5 days.    Blood culture x two cultures. Draw prior to antibiotics. [[id number]]     Order Status:  Canceled Specimen:  Blood     Blood culture x two cultures. Draw prior to antibiotics. [[id number]]     Order Status:  Canceled Specimen:  Blood         Chest x-ray personally reviewed today:  Slight worsening of bilateral patchy infiltrates    Inpatient medications  Scheduled Meds:   albuterol  2 puff Inhalation Q8H    enoxaparin  40 mg Subcutaneous Q12H    escitalopram oxalate  10 mg Oral Daily    hydroxychloroquine  200  mg Oral BID    insulin detemir U-100  10 Units Subcutaneous Daily     Continuous Infusions:    PRN Meds:.acetaminophen, butalbital-acetaminophen-caffeine -40 mg, dextrose 50%, dextrose 50%, glucagon (human recombinant), glucose, glucose, hydrALAZINE, insulin aspart U-100, LORazepam, melatonin, morphine, ondansetron, sodium chloride, sodium chloride 0.9%    Above encounter included review of the medical records, interviewing and examining the patient face-to-face, discussion with family and other health care providers, ordering and interpreting lab/test results and formulating a plan of care.     Shaheen Ribera  [facility] Hospitalist  03/21/2020

## 2020-03-22 LAB
ANION GAP SERPL CALC-SCNC: 12 MMOL/L (ref 8–16)
BASOPHILS # BLD AUTO: 0.02 K/UL (ref 0–0.2)
BASOPHILS NFR BLD: 0.4 % (ref 0–1.9)
BNP SERPL-MCNC: 16 PG/ML (ref 0–99)
BUN SERPL-MCNC: 10 MG/DL (ref 6–20)
CALCIUM SERPL-MCNC: 9.1 MG/DL (ref 8.7–10.5)
CHLORIDE SERPL-SCNC: 99 MMOL/L (ref 95–110)
CO2 SERPL-SCNC: 28 MMOL/L (ref 23–29)
CREAT SERPL-MCNC: 0.6 MG/DL (ref 0.5–1.4)
DIFFERENTIAL METHOD: ABNORMAL
EOSINOPHIL # BLD AUTO: 0.1 K/UL (ref 0–0.5)
EOSINOPHIL NFR BLD: 1.6 % (ref 0–8)
ERYTHROCYTE [DISTWIDTH] IN BLOOD BY AUTOMATED COUNT: 13.7 % (ref 11.5–14.5)
EST. GFR  (AFRICAN AMERICAN): >60 ML/MIN/1.73 M^2
EST. GFR  (NON AFRICAN AMERICAN): >60 ML/MIN/1.73 M^2
GLUCOSE SERPL-MCNC: 106 MG/DL (ref 70–110)
GLUCOSE SERPL-MCNC: 108 MG/DL (ref 70–110)
GLUCOSE SERPL-MCNC: 116 MG/DL (ref 70–110)
GLUCOSE SERPL-MCNC: 131 MG/DL (ref 70–110)
HCT VFR BLD AUTO: 31.5 % (ref 37–48.5)
HGB BLD-MCNC: 9.9 G/DL (ref 12–16)
IMM GRANULOCYTES # BLD AUTO: 0.09 K/UL (ref 0–0.04)
IMM GRANULOCYTES NFR BLD AUTO: 1.8 % (ref 0–0.5)
LYMPHOCYTES # BLD AUTO: 1.2 K/UL (ref 1–4.8)
LYMPHOCYTES NFR BLD: 24.8 % (ref 18–48)
MAGNESIUM SERPL-MCNC: 2 MG/DL (ref 1.6–2.6)
MCH RBC QN AUTO: 25.8 PG (ref 27–31)
MCHC RBC AUTO-ENTMCNC: 31.4 G/DL (ref 32–36)
MCV RBC AUTO: 82 FL (ref 82–98)
MONOCYTES # BLD AUTO: 0.4 K/UL (ref 0.3–1)
MONOCYTES NFR BLD: 7.4 % (ref 4–15)
NEUTROPHILS # BLD AUTO: 3.2 K/UL (ref 1.8–7.7)
NEUTROPHILS NFR BLD: 64 % (ref 38–73)
NRBC BLD-RTO: 1 /100 WBC
PLATELET # BLD AUTO: 291 K/UL (ref 150–350)
PMV BLD AUTO: 9.3 FL (ref 9.2–12.9)
POTASSIUM SERPL-SCNC: 3.8 MMOL/L (ref 3.5–5.1)
RBC # BLD AUTO: 3.84 M/UL (ref 4–5.4)
SODIUM SERPL-SCNC: 139 MMOL/L (ref 136–145)
WBC # BLD AUTO: 5.01 K/UL (ref 3.9–12.7)

## 2020-03-22 PROCEDURE — 25000003 PHARM REV CODE 250: Performed by: INTERNAL MEDICINE

## 2020-03-22 PROCEDURE — 99900035 HC TECH TIME PER 15 MIN (STAT)

## 2020-03-22 PROCEDURE — 25000003 PHARM REV CODE 250: Performed by: STUDENT IN AN ORGANIZED HEALTH CARE EDUCATION/TRAINING PROGRAM

## 2020-03-22 PROCEDURE — 12000002 HC ACUTE/MED SURGE SEMI-PRIVATE ROOM

## 2020-03-22 PROCEDURE — 94761 N-INVAS EAR/PLS OXIMETRY MLT: CPT

## 2020-03-22 PROCEDURE — 63600175 PHARM REV CODE 636 W HCPCS: Performed by: STUDENT IN AN ORGANIZED HEALTH CARE EDUCATION/TRAINING PROGRAM

## 2020-03-22 PROCEDURE — 93005 ELECTROCARDIOGRAM TRACING: CPT | Performed by: INTERNAL MEDICINE

## 2020-03-22 PROCEDURE — 27000221 HC OXYGEN, UP TO 24 HOURS

## 2020-03-22 PROCEDURE — 94640 AIRWAY INHALATION TREATMENT: CPT

## 2020-03-22 PROCEDURE — 85025 COMPLETE CBC W/AUTO DIFF WBC: CPT

## 2020-03-22 PROCEDURE — 80048 BASIC METABOLIC PNL TOTAL CA: CPT

## 2020-03-22 PROCEDURE — 36415 COLL VENOUS BLD VENIPUNCTURE: CPT

## 2020-03-22 PROCEDURE — 83735 ASSAY OF MAGNESIUM: CPT

## 2020-03-22 PROCEDURE — 83880 ASSAY OF NATRIURETIC PEPTIDE: CPT

## 2020-03-22 RX ADMIN — LORAZEPAM 0.5 MG: 0.5 TABLET ORAL at 02:03

## 2020-03-22 RX ADMIN — ALBUTEROL SULFATE 2 PUFF: 90 AEROSOL, METERED RESPIRATORY (INHALATION) at 08:03

## 2020-03-22 RX ADMIN — ENOXAPARIN SODIUM 40 MG: 100 INJECTION SUBCUTANEOUS at 09:03

## 2020-03-22 RX ADMIN — BUTALBITAL, ACETAMINOPHEN AND CAFFEINE 1 TABLET: 50; 325; 40 TABLET ORAL at 07:03

## 2020-03-22 RX ADMIN — HYDROXYCHLOROQUINE SULFATE 200 MG: 200 TABLET, FILM COATED ORAL at 08:03

## 2020-03-22 RX ADMIN — HYDROXYCHLOROQUINE SULFATE 200 MG: 200 TABLET, FILM COATED ORAL at 09:03

## 2020-03-22 RX ADMIN — ACETAMINOPHEN 1000 MG: 500 TABLET, FILM COATED ORAL at 09:03

## 2020-03-22 RX ADMIN — ALBUTEROL SULFATE 2 PUFF: 90 AEROSOL, METERED RESPIRATORY (INHALATION) at 01:03

## 2020-03-22 RX ADMIN — INSULIN DETEMIR 10 UNITS: 100 INJECTION, SOLUTION SUBCUTANEOUS at 09:03

## 2020-03-22 RX ADMIN — ENOXAPARIN SODIUM 40 MG: 100 INJECTION SUBCUTANEOUS at 08:03

## 2020-03-22 RX ADMIN — LORAZEPAM 0.5 MG: 0.5 TABLET ORAL at 09:03

## 2020-03-22 RX ADMIN — ESCITALOPRAM OXALATE 10 MG: 10 TABLET ORAL at 08:03

## 2020-03-22 NOTE — PLAN OF CARE
03/22/20 0739   Patient Assessment/Suction   Level of Consciousness (AVPU) alert   Respiratory Effort Normal;Unlabored   Expansion/Accessory Muscles/Retractions no use of accessory muscles;no retractions   All Lung Fields Breath Sounds diminished   Rhythm/Pattern, Respiratory unlabored;pattern regular;depth regular   PRE-TX-O2   O2 Device (Oxygen Therapy) nasal cannula   $ Is the patient on Low Flow Oxygen? Yes   Flow (L/min) 3   SpO2 (!) 94 %   Pulse Oximetry Type Intermittent   $ Pulse Oximetry - Multiple Charge Pulse Oximetry - Multiple   Pulse 77   Resp 16   Inhaler   $ Inhaler Charges MDI (Metered Dose Inahler) Treatment;Given With Spacer   Daily Review of Necessity (Inhaler) completed   Respiratory Treatment Status (Inhaler) given   Treatment Route (Inhaler) mouthpiece   Patient Position (Inhaler) sitting on edge of bed   Post Treatment Assessment (Inhaler) breath sounds unchanged   Signs of Intolerance (Inhaler) none   Respiratory Evaluation   $ Care Plan Tech Time 15 min   Evaluation For   (care plan)

## 2020-03-22 NOTE — PROGRESS NOTES
[facility] Medicine   Progress Note  Patient Name: Patria Bennett MRN: [id number]   Patient Class: IP- Inpatient  Length of Stay: 8   Admission Date: 3/14/2020  7:46 PM Attending Physician:  Shaheen Ribera MD   Primary Care Provider: DINAH Leos Face-to-Face encounter date: 03/22/2020     Assessment & Plan:   Patria Bennett is a 49 y.o. female admitted for    Assessment:  Pneumonia secondary to SARS-CoV2  Acute hypoxemic respiratory failure secondary tosecondary to SARS-CoV2  Anemia likely inflammatory secondary to SARS-CoV2  Leukopenia likely inflammatorysecondary to SARS-CoV2 improved  Hypokalemia persistent  Hypomagnesemia improved  Chronic mood disorder  Diabetes mellitus type 2    Plan:   Continue care.  Appreciate consultants.  The patient is very slowly clinically improving  Persistent hypoxemia with ambulation.  Will need 6 min walk test for home oxygen assessment.  If she is unable to ambulate adequately, may need room air ABG  Appears euvolemic. Status post IV Lasix diuresis for mild iatrogenic volume overload.  Monitor urine output and fluid status.   Continue hydroxychloroquine.  Continue appropriate isolation measures. Off empiric Levaquin.  Continue MDI bronchodilator.  Continue mucolytic and antitussives.  Monitor and replace electrolytes as needed  Continue chocolate Glucerna with meals  Repeat a.m. Labs.   Glucose stable.  Continue sliding scale insulin.  Monitor glucose and titrate regimen as needed.  Mobilize as able around the room  VTE prophylaxis with Lovenox  High risk secondary to acute illness with risk to life (pneumonia due to COVID19) and hypoxemic respiratory failure requiring persistent oxygen    Subjective:    Interval History:   Today the patient reports persistent shortness of breath, slightly improved from yesterday, mild intensity at rest and worsens with any activity or exertion, improved with oxygen (3LNC) and worsens on room air.  Minimal  cough with no sputum production.  No fever or chills.  Poor appetite slowly improving.  No nausea or vomiting.  No abdominal pain or chest pain.    Objective:   Physical Exam  Vitals reviewed.  General:  Morbidly obese, nontoxic, nondiaphoretic, comfortable appearing  Head and eyes:  Anicteric sclerae, no conjunctival discharge  ENT:  Moist mucous membranes  Pulmonary:  Comfortable work of breathing at rest, nasal cannula oxygen in place, fine basilar crackles unchanged  GI:  Abdomen is obese, soft and nontender  Cardiovascular:  2+ radial pulses bilaterally, regular rate and rhythm, extremities warm and well perfused  Skin:  Dry and warm no jaundice  Psych:  Mood is improved, affect normal, insight normal  Neuro:  Nonfocal motor exam, fluent speech, alert and oriented    Following labs were Reviewed:  Recent Labs   Lab 03/22/20  0527   WBC 5.01   HGB 9.9*   HCT 31.5*      CALCIUM 9.1      K 3.8   CO2 28   CL 99   BUN 10   CREATININE 0.6     No results found for: POCTGLUCOSE     All labs within the past 24 hours have been reviewed  Microbiology Results (last 7 days)     Procedure Component Value Units Date/Time    Blood Culture #2 **CANNOT BE ORDERED STAT** [[id number]] Collected:  03/14/20 2240    Order Status:  Completed Specimen:  Blood from Peripheral, Forearm, Right Updated:  03/20/20 0232     Blood Culture, Routine No growth after 5 days.    Blood Culture #1 **CANNOT BE ORDERED STAT** [[id number]] Collected:  03/14/20 2125    Order Status:  Completed Specimen:  Blood from Peripheral, Hand, Right Updated:  03/20/20 0232     Blood Culture, Routine No growth after 5 days.        Chest x-ray personally reviewed today:  Slight improvement of bilateral basilar patchy infiltrates    Inpatient medications  Scheduled Meds:   albuterol  2 puff Inhalation Q8H    enoxaparin  40 mg Subcutaneous Q12H    escitalopram oxalate  10 mg Oral Daily    hydroxychloroquine  200 mg Oral BID    insulin detemir U-100  10  Units Subcutaneous Daily     Continuous Infusions:    PRN Meds:.acetaminophen, butalbital-acetaminophen-caffeine -40 mg, dextrose 50%, dextrose 50%, glucagon (human recombinant), glucose, glucose, hydrALAZINE, insulin aspart U-100, LORazepam, melatonin, morphine, ondansetron, sodium chloride, sodium chloride 0.9%    Above encounter included review of the medical records, interviewing and examining the patient face-to-face, discussion with family and other health care providers, ordering and interpreting lab/test results and formulating a plan of care.     Shaheen Ribera  [facility] Hospitalist  03/22/2020

## 2020-03-22 NOTE — PLAN OF CARE
Cough deep breathing often, thick secretion with blood tinged clear sputum. Pt is using IS and flutter valve. States she feeling better. General weakness conts

## 2020-03-23 LAB
ANION GAP SERPL CALC-SCNC: 6 MMOL/L (ref 8–16)
BASOPHILS # BLD AUTO: 0.02 K/UL (ref 0–0.2)
BASOPHILS NFR BLD: 0.4 % (ref 0–1.9)
BUN SERPL-MCNC: 9 MG/DL (ref 6–20)
CALCIUM SERPL-MCNC: 8.8 MG/DL (ref 8.7–10.5)
CHLORIDE SERPL-SCNC: 103 MMOL/L (ref 95–110)
CO2 SERPL-SCNC: 29 MMOL/L (ref 23–29)
CREAT SERPL-MCNC: 0.7 MG/DL (ref 0.5–1.4)
DIFFERENTIAL METHOD: ABNORMAL
EOSINOPHIL # BLD AUTO: 0.2 K/UL (ref 0–0.5)
EOSINOPHIL NFR BLD: 3.2 % (ref 0–8)
ERYTHROCYTE [DISTWIDTH] IN BLOOD BY AUTOMATED COUNT: 13.8 % (ref 11.5–14.5)
EST. GFR  (AFRICAN AMERICAN): >60 ML/MIN/1.73 M^2
EST. GFR  (NON AFRICAN AMERICAN): >60 ML/MIN/1.73 M^2
GLUCOSE SERPL-MCNC: 116 MG/DL (ref 70–110)
GLUCOSE SERPL-MCNC: 119 MG/DL (ref 70–110)
GLUCOSE SERPL-MCNC: 130 MG/DL (ref 70–110)
HCT VFR BLD AUTO: 32.1 % (ref 37–48.5)
HGB BLD-MCNC: 10.1 G/DL (ref 12–16)
IMM GRANULOCYTES # BLD AUTO: 0.11 K/UL (ref 0–0.04)
IMM GRANULOCYTES NFR BLD AUTO: 1.9 % (ref 0–0.5)
LYMPHOCYTES # BLD AUTO: 1.2 K/UL (ref 1–4.8)
LYMPHOCYTES NFR BLD: 21 % (ref 18–48)
MAGNESIUM SERPL-MCNC: 2 MG/DL (ref 1.6–2.6)
MCH RBC QN AUTO: 25.8 PG (ref 27–31)
MCHC RBC AUTO-ENTMCNC: 31.5 G/DL (ref 32–36)
MCV RBC AUTO: 82 FL (ref 82–98)
MONOCYTES # BLD AUTO: 0.5 K/UL (ref 0.3–1)
MONOCYTES NFR BLD: 8.5 % (ref 4–15)
NEUTROPHILS # BLD AUTO: 3.7 K/UL (ref 1.8–7.7)
NEUTROPHILS NFR BLD: 65 % (ref 38–73)
NRBC BLD-RTO: 1 /100 WBC
PLATELET # BLD AUTO: 322 K/UL (ref 150–350)
PMV BLD AUTO: 9.4 FL (ref 9.2–12.9)
POTASSIUM SERPL-SCNC: 3.7 MMOL/L (ref 3.5–5.1)
RBC # BLD AUTO: 3.91 M/UL (ref 4–5.4)
SODIUM SERPL-SCNC: 138 MMOL/L (ref 136–145)
WBC # BLD AUTO: 5.68 K/UL (ref 3.9–12.7)

## 2020-03-23 PROCEDURE — 25000003 PHARM REV CODE 250: Performed by: STUDENT IN AN ORGANIZED HEALTH CARE EDUCATION/TRAINING PROGRAM

## 2020-03-23 PROCEDURE — 80048 BASIC METABOLIC PNL TOTAL CA: CPT

## 2020-03-23 PROCEDURE — 25000003 PHARM REV CODE 250: Performed by: INTERNAL MEDICINE

## 2020-03-23 PROCEDURE — 12000002 HC ACUTE/MED SURGE SEMI-PRIVATE ROOM

## 2020-03-23 PROCEDURE — 94761 N-INVAS EAR/PLS OXIMETRY MLT: CPT

## 2020-03-23 PROCEDURE — 99900035 HC TECH TIME PER 15 MIN (STAT)

## 2020-03-23 PROCEDURE — 36415 COLL VENOUS BLD VENIPUNCTURE: CPT

## 2020-03-23 PROCEDURE — 85025 COMPLETE CBC W/AUTO DIFF WBC: CPT

## 2020-03-23 PROCEDURE — 63600175 PHARM REV CODE 636 W HCPCS: Performed by: STUDENT IN AN ORGANIZED HEALTH CARE EDUCATION/TRAINING PROGRAM

## 2020-03-23 PROCEDURE — 27000221 HC OXYGEN, UP TO 24 HOURS

## 2020-03-23 PROCEDURE — 83735 ASSAY OF MAGNESIUM: CPT

## 2020-03-23 RX ORDER — ALBUTEROL SULFATE 90 UG/1
2 AEROSOL, METERED RESPIRATORY (INHALATION) EVERY 4 HOURS PRN
Status: DISCONTINUED | OUTPATIENT
Start: 2020-03-24 | End: 2020-03-25 | Stop reason: HOSPADM

## 2020-03-23 RX ADMIN — BUTALBITAL, ACETAMINOPHEN AND CAFFEINE 1 TABLET: 50; 325; 40 TABLET ORAL at 08:03

## 2020-03-23 RX ADMIN — ESCITALOPRAM OXALATE 10 MG: 10 TABLET ORAL at 08:03

## 2020-03-23 RX ADMIN — ENOXAPARIN SODIUM 40 MG: 100 INJECTION SUBCUTANEOUS at 08:03

## 2020-03-23 RX ADMIN — ENOXAPARIN SODIUM 40 MG: 100 INJECTION SUBCUTANEOUS at 09:03

## 2020-03-23 RX ADMIN — INSULIN DETEMIR 10 UNITS: 100 INJECTION, SOLUTION SUBCUTANEOUS at 09:03

## 2020-03-23 RX ADMIN — HYDROXYCHLOROQUINE SULFATE 200 MG: 200 TABLET, FILM COATED ORAL at 09:03

## 2020-03-23 RX ADMIN — LORAZEPAM 0.5 MG: 0.5 TABLET ORAL at 08:03

## 2020-03-23 RX ADMIN — HYDROXYCHLOROQUINE SULFATE 200 MG: 200 TABLET, FILM COATED ORAL at 08:03

## 2020-03-23 RX ADMIN — ALBUTEROL SULFATE 2 PUFF: 90 AEROSOL, METERED RESPIRATORY (INHALATION) at 12:03

## 2020-03-23 NOTE — PROGRESS NOTES
[facility] Medicine   Progress Note  Patient Name: Patria Bennett MRN: [id number]   Patient Class: IP- Inpatient  Length of Stay: 9   Admission Date: 3/14/2020  7:46 PM Attending Physician:  Shaheen Ribera MD   Primary Care Provider: DINAH Leos Face-to-Face encounter date: 03/23/2020     Assessment & Plan:   Patria Bennett is a 49 y.o. female admitted for    Assessment:  Pneumonia secondary to SARS-CoV2  Acute hypoxemic respiratory failure secondary tosecondary to SARS-CoV2  Anemia likely inflammatory secondary to SARS-CoV2  Leukopenia likely inflammatorysecondary to SARS-CoV2 improved  Hypokalemia persistent  Hypomagnesemia improved  Chronic mood disorder  Diabetes mellitus type 2    Plan:   Continue care.  Appreciate consultants.  The patient is very slowly a a improving  6 min walk test for home oxygen assessment.  If she is unable to ambulate adequately, may need room air ABG.  Appears euvolemic.  Status post IV Lasix diuresis for mild iatrogenic volume overload.  Monitor urine output and fluid status.   Continue hydroxychloroquine.  Continue appropriate isolation measures. Off empiric Levaquin.  Continue MDI bronchodilator.  Continue mucolytic and antitussives.  Monitor and replace electrolytes as needed  Continue chocolate Glucerna with meals  Repeat a.m. Labs.   Glucose stable.  Continue sliding scale insulin.  Monitor glucose and titrate regimen as needed.  Mobilize as able around the room.  PT/OT not working with COVID patients  VTE prophylaxis with Lovenox  High risk secondary to acute illness with risk to life (pneumonia due to COVID19) and hypoxemic respiratory failure requiring persistent oxygen  Disposition:  Likely discharge 48-72 hours with home health services for PT/OT if available    Subjective:    Interval History:   Today the patient reports persistent shortness of breath, slowly improving with medical treatment, mild intensity at rest and worsens with  exertion to moderate intensity, improved with nasal cannula oxygen and worsens on room air.  She reports persistent mild dry cough that is improving slowly.  No fever or chills.  Poor appetite slowly improving coma tolerating Glucerna shakes.  She has some mild nausea earlier today but now is feeling better.  No abdominal pain or chest pain.    Objective:   Physical Exam  Vitals reviewed.  General:  Morbidly obese, nontoxic, nondiaphoretic, comfortable appearing  Head and eyes:  Anicteric sclerae, no conjunctival discharge  ENT:  Moist mucous membranes  Pulmonary:  Comfortable work of breathing at rest, nasal cannula oxygen in place, no crackles heard today  GI:  Abdomen is obese, soft and nontender  Cardiovascular:  2+ radial pulses bilaterally, regular rate and rhythm, extremities warm and well perfused  Skin:  Dry and warm no jaundice  Psych:  Mood is good, affect normal, insight normal  Neuro:  Nonfocal motor exam, fluent speech, alert and oriented    Following labs were Reviewed:  Recent Labs   Lab 03/23/20  0435   WBC 5.68   HGB 10.1*   HCT 32.1*      CALCIUM 8.8      K 3.7   CO2 29      BUN 9   CREATININE 0.7     No results found for: POCTGLUCOSE     All labs within the past 24 hours have been reviewed  Microbiology Results (last 7 days)     Procedure Component Value Units Date/Time    Blood Culture #2 **CANNOT BE ORDERED STAT** [[id number]] Collected:  03/14/20 2240    Order Status:  Completed Specimen:  Blood from Peripheral, Forearm, Right Updated:  03/20/20 0232     Blood Culture, Routine No growth after 5 days.    Blood Culture #1 **CANNOT BE ORDERED STAT** [[id number]] Collected:  03/14/20 2125    Order Status:  Completed Specimen:  Blood from Peripheral, Hand, Right Updated:  03/20/20 0232     Blood Culture, Routine No growth after 5 days.        Chest x-ray 3/22:  Slight improvement of bilateral basilar patchy infiltrates    Inpatient medications  Scheduled Meds:   albuterol  2 puff  Inhalation Q8H    enoxaparin  40 mg Subcutaneous Q12H    escitalopram oxalate  10 mg Oral Daily    hydroxychloroquine  200 mg Oral BID    insulin detemir U-100  10 Units Subcutaneous Daily     Continuous Infusions:    PRN Meds:.acetaminophen, butalbital-acetaminophen-caffeine -40 mg, dextrose 50%, dextrose 50%, glucagon (human recombinant), glucose, glucose, hydrALAZINE, insulin aspart U-100, LORazepam, melatonin, morphine, ondansetron, sodium chloride, sodium chloride 0.9%    Above encounter included review of the medical records, interviewing and examining the patient face-to-face, discussion with family and other health care providers, ordering and interpreting lab/test results and formulating a plan of care.     Shaheen Ribera  [facility] Hospitalist  03/23/2020

## 2020-03-23 NOTE — PLAN OF CARE
03/23/20 0905   Patient Assessment/Suction   Level of Consciousness (AVPU) alert   Respiratory Effort Unlabored   Expansion/Accessory Muscles/Retractions no use of accessory muscles;no retractions   All Lung Fields Breath Sounds diminished   PRE-TX-O2   O2 Device (Oxygen Therapy) nasal cannula   $ Is the patient on Low Flow Oxygen? Yes   SpO2 95 %   Pulse Oximetry Type Intermittent   $ Pulse Oximetry - Multiple Charge Pulse Oximetry - Multiple   Pulse 71   Resp 18   Aerosol Therapy   $ Aerosol Therapy Charges PRN treatment not required   Respiratory Treatment Status (SVN) PRN treatment not required

## 2020-03-23 NOTE — PLAN OF CARE
Problem: Adult Inpatient Plan of Care  Goal: Plan of Care Review  Outcome: Ongoing, Progressing  Goal: Absence of Hospital-Acquired Illness or Injury  Outcome: Ongoing, Progressing  Goal: Optimal Comfort and Wellbeing  Outcome: Ongoing, Progressing  Goal: Readiness for Transition of Care  Outcome: Ongoing, Progressing     Problem: Infection  Goal: Infection Symptom Resolution  Outcome: Ongoing, Progressing

## 2020-03-23 NOTE — CARE UPDATE
03/23/20 0907   Home Oxygen Qualification   Room Air SpO2 At Rest 90 %   Room Air SpO2 on Exertion (!) 82 %   SpO2 During Exertion on O2 94 %   Heart Rate on O2 67 bpm   Exertion O2 LPM 3 LPM   SpO2 on Recovery 95 %   Recovery Heart Rate 77 bpm   Recovery O2 LPM 3 LPM   Home O2 Eval Comments   (Pt felt weak. Only ambulated a few feet in room. Qualifies.)

## 2020-03-23 NOTE — RESPIRATORY THERAPY
03/22/20 1649   Patient Assessment/Suction   Level of Consciousness (AVPU)   (SLEEPING)   Respiratory Effort Unlabored   Expansion/Accessory Muscles/Retractions expansion symmetric;no retractions;no use of accessory muscles   All Lung Fields Breath Sounds diminished   Rhythm/Pattern, Respiratory no shortness of breath reported;pattern regular;unlabored   PRE-TX-O2   O2 Device (Oxygen Therapy) nasal cannula   Flow (L/min) 3   SpO2 95 %   Pulse Oximetry Type Intermittent   $ Pulse Oximetry - Multiple Charge Pulse Oximetry - Multiple   Pulse 78   Resp 18   Respiratory Evaluation   $ Care Plan Tech Time 15 min

## 2020-03-24 PROBLEM — J96.01 ACUTE HYPOXEMIC RESPIRATORY FAILURE: Status: ACTIVE | Noted: 2020-03-24

## 2020-03-24 PROBLEM — U07.1 PNEUMONIA DUE TO COVID-19 VIRUS: Status: ACTIVE | Noted: 2020-03-24

## 2020-03-24 PROBLEM — J12.82 PNEUMONIA DUE TO COVID-19 VIRUS: Status: ACTIVE | Noted: 2020-03-24

## 2020-03-24 PROBLEM — E66.01 MORBID OBESITY: Status: ACTIVE | Noted: 2020-03-24

## 2020-03-24 LAB
ANION GAP SERPL CALC-SCNC: 10 MMOL/L (ref 8–16)
BASOPHILS # BLD AUTO: 0.02 K/UL (ref 0–0.2)
BASOPHILS NFR BLD: 0.4 % (ref 0–1.9)
BUN SERPL-MCNC: 9 MG/DL (ref 6–20)
CALCIUM SERPL-MCNC: 8.8 MG/DL (ref 8.7–10.5)
CHLORIDE SERPL-SCNC: 104 MMOL/L (ref 95–110)
CO2 SERPL-SCNC: 26 MMOL/L (ref 23–29)
CREAT SERPL-MCNC: 0.7 MG/DL (ref 0.5–1.4)
DIFFERENTIAL METHOD: ABNORMAL
EOSINOPHIL # BLD AUTO: 0.2 K/UL (ref 0–0.5)
EOSINOPHIL NFR BLD: 3.6 % (ref 0–8)
ERYTHROCYTE [DISTWIDTH] IN BLOOD BY AUTOMATED COUNT: 13.7 % (ref 11.5–14.5)
EST. GFR  (AFRICAN AMERICAN): >60 ML/MIN/1.73 M^2
EST. GFR  (NON AFRICAN AMERICAN): >60 ML/MIN/1.73 M^2
GLUCOSE SERPL-MCNC: 107 MG/DL (ref 70–110)
GLUCOSE SERPL-MCNC: 122 MG/DL (ref 70–110)
GLUCOSE SERPL-MCNC: 132 MG/DL (ref 70–110)
GLUCOSE SERPL-MCNC: 89 MG/DL (ref 70–110)
HCT VFR BLD AUTO: 32.7 % (ref 37–48.5)
HGB BLD-MCNC: 10.1 G/DL (ref 12–16)
IMM GRANULOCYTES # BLD AUTO: 0.12 K/UL (ref 0–0.04)
IMM GRANULOCYTES NFR BLD AUTO: 2.3 % (ref 0–0.5)
LYMPHOCYTES # BLD AUTO: 1.3 K/UL (ref 1–4.8)
LYMPHOCYTES NFR BLD: 23.7 % (ref 18–48)
MAGNESIUM SERPL-MCNC: 1.9 MG/DL (ref 1.6–2.6)
MCH RBC QN AUTO: 25.4 PG (ref 27–31)
MCHC RBC AUTO-ENTMCNC: 30.9 G/DL (ref 32–36)
MCV RBC AUTO: 82 FL (ref 82–98)
MONOCYTES # BLD AUTO: 0.4 K/UL (ref 0.3–1)
MONOCYTES NFR BLD: 8.2 % (ref 4–15)
NEUTROPHILS # BLD AUTO: 3.3 K/UL (ref 1.8–7.7)
NEUTROPHILS NFR BLD: 61.8 % (ref 38–73)
NRBC BLD-RTO: 1 /100 WBC
PLATELET # BLD AUTO: 301 K/UL (ref 150–350)
PMV BLD AUTO: 9.3 FL (ref 9.2–12.9)
POTASSIUM SERPL-SCNC: 3.5 MMOL/L (ref 3.5–5.1)
RBC # BLD AUTO: 3.97 M/UL (ref 4–5.4)
SODIUM SERPL-SCNC: 140 MMOL/L (ref 136–145)
WBC # BLD AUTO: 5.27 K/UL (ref 3.9–12.7)

## 2020-03-24 PROCEDURE — 12000002 HC ACUTE/MED SURGE SEMI-PRIVATE ROOM

## 2020-03-24 PROCEDURE — 82962 GLUCOSE BLOOD TEST: CPT

## 2020-03-24 PROCEDURE — 63600175 PHARM REV CODE 636 W HCPCS: Performed by: STUDENT IN AN ORGANIZED HEALTH CARE EDUCATION/TRAINING PROGRAM

## 2020-03-24 PROCEDURE — 83735 ASSAY OF MAGNESIUM: CPT

## 2020-03-24 PROCEDURE — 25000003 PHARM REV CODE 250: Performed by: STUDENT IN AN ORGANIZED HEALTH CARE EDUCATION/TRAINING PROGRAM

## 2020-03-24 PROCEDURE — 94761 N-INVAS EAR/PLS OXIMETRY MLT: CPT

## 2020-03-24 PROCEDURE — 36415 COLL VENOUS BLD VENIPUNCTURE: CPT

## 2020-03-24 PROCEDURE — 99900035 HC TECH TIME PER 15 MIN (STAT)

## 2020-03-24 PROCEDURE — 94640 AIRWAY INHALATION TREATMENT: CPT

## 2020-03-24 PROCEDURE — 25000003 PHARM REV CODE 250: Performed by: INTERNAL MEDICINE

## 2020-03-24 PROCEDURE — 80048 BASIC METABOLIC PNL TOTAL CA: CPT

## 2020-03-24 PROCEDURE — 85025 COMPLETE CBC W/AUTO DIFF WBC: CPT

## 2020-03-24 PROCEDURE — 27000221 HC OXYGEN, UP TO 24 HOURS

## 2020-03-24 RX ORDER — FUROSEMIDE 20 MG/1
20 TABLET ORAL DAILY
Status: DISCONTINUED | OUTPATIENT
Start: 2020-03-24 | End: 2020-03-25 | Stop reason: HOSPADM

## 2020-03-24 RX ADMIN — FUROSEMIDE 20 MG: 20 TABLET ORAL at 09:03

## 2020-03-24 RX ADMIN — HYDROXYCHLOROQUINE SULFATE 200 MG: 200 TABLET, FILM COATED ORAL at 09:03

## 2020-03-24 RX ADMIN — INSULIN DETEMIR 10 UNITS: 100 INJECTION, SOLUTION SUBCUTANEOUS at 09:03

## 2020-03-24 RX ADMIN — ENOXAPARIN SODIUM 40 MG: 100 INJECTION SUBCUTANEOUS at 09:03

## 2020-03-24 RX ADMIN — ESCITALOPRAM OXALATE 10 MG: 10 TABLET ORAL at 09:03

## 2020-03-24 NOTE — PLAN OF CARE
03/24/20 1307   Post-Acute Status   Post-Acute Authorization HME   E Status Referrals Sent       Order for home O2 sent to [id number] to follow

## 2020-03-24 NOTE — PLAN OF CARE
Problem: Diabetes Comorbidity  Goal: Blood Glucose Level Within Desired Range  Outcome: Ongoing, Progressing     Problem: Oral Intake Inadequate  Goal: Improved Oral Intake  Outcome: Ongoing, Progressing       Recommendation/Intervention:  1. Continue diet as tolerated by pt; encourage PO intake   2. RD to discontinue Glucerna--not consuming   3.  to assist with meal choices daily   Goals: 1. Pt to meet at least 75% estimated needs via PO diet

## 2020-03-24 NOTE — PROGRESS NOTES
Chart reviewed remotely  Tomorrow will be day 10 of hydroxychloroquine and it can then be stopped.   Will be available by phone if needed.

## 2020-03-24 NOTE — PROGRESS NOTES
"[facility]  Adult Nutrition   Progress Note (Follow-Up)    SUMMARY     Recommendations  Recommendation/Intervention: 1. Continue diet as tolerated by pt; encourage PO intake 2. RD to discontinue Glucerna--not consuming 3.  to assist with meal choices daily   Goals: 1. Pt to meet at least 75% estimated needs via PO diet   Nutrition Goal Status: progressing towards goal  Communication of RD Recs: reviewed with RN    Dietitian Rounds Brief    Pt seen for f/u. Decreased appetite noted; but improving per RN. Not consuming Glucerna--concern that was causing diarrhea per RN. Also consuming some outside foods brought in by family. LBM 3/23. RD to discontinue supplement at this time.    Reason for Assessment  Reason For Assessment: RD follow-up  Diagnosis: infection/sepsis, pulmonary disease  Relevant Medical History: DM, HTN, GERD    Nutrition Risk Screen  Nutrition Risk Screen: no indicators present     MST Score: 0  Have you recently lost weight without trying?: No  Weight loss score: 0  Have you been eating poorly because of a decreased appetite?: No  Appetite score: 0       Nutrition/Diet History  Spiritual, Cultural Beliefs, [employer] Practices, Values that Affect Care: no  Food Allergies: NKFA  Factors Affecting Nutritional Intake: decreased appetite    Anthropometrics  Temp: 98.3 °F (36.8 °C)  Height Method: Measured  Height: 5' 9" (175.3 cm)  Height (inches): 69 in  Weight Method: Bed Scale  Weight: 131 kg (288 lb 12.8 oz)  Weight (lb): 288.81 lb  Ideal Body Weight (IBW), Female: 145 lb  % Ideal Body Weight, Female (lb): 199.18 %  BMI (Calculated): 42.6  BMI Grade: greater than 40 - morbid obesity       Weight History:  Wt Readings from Last 10 Encounters:   03/19/20 131 kg (288 lb 12.8 oz)   03/12/20 128.4 kg (283 lb)   08/26/19 128.8 kg (284 lb)   02/27/19 125.6 kg (277 lb)   12/30/09 (!) 139.5 kg (307 lb 10.4 oz)       Lab/Procedures/Meds: Pertinent Labs Reviewed  Clinical Chemistry:  Recent " Labs   Lab 03/18/20  0500 03/19/20  0506  03/24/20  0441     138 135*  135*   < > 140   K 3.7  3.7 3.5  3.5   < > 3.5     107 105  105   < > 104   CO2 26  26 26  26   < > 26   *  131* 139*  139*   < > 132*   BUN 7  7 8  8   < > 9   CREATININE 0.6  0.6 0.6  0.6   < > 0.7   CALCIUM 8.1*  8.1* 8.0*  8.0*   < > 8.8   PROT 6.2 6.3  --   --    ALBUMIN 3.0* 2.8*  --   --    BILITOT 0.8 0.8  --   --    ALKPHOS 43* 46*  --   --    AST 29 28  --   --    ALT 25 23  --   --    ANIONGAP 5*  5* 4*  4*   < > 10   ESTGFRAFRICA >60.0  >60.0 >60.0  >60.0   < > >60.0   EGFRNONAA >60.0  >60.0 >60.0  >60.0   < > >60.0   MG 1.8 1.9   < > 1.9   PHOS 3.2 3.8  --   --     < > = values in this interval not displayed.     CBC:   Recent Labs   Lab 03/24/20  0441   WBC 5.27   RBC 3.97*   HGB 10.1*   HCT 32.7*      MCV 82   MCH 25.4*   MCHC 30.9*     Lipid Panel:  No results for input(s): CHOL, HDL, LDLCALC, TRIG, CHOLHDL in the last 168 hours.  Cardiac Profile:  Recent Labs   Lab 03/20/20  0506 03/22/20  0527   BNP  --  16   TROPONINI <0.030  --      Inflammatory Labs:  No results for input(s): CRP in the last 168 hours.  Diabetes:  No results for input(s): HGBA1C, POCTGLUCOSE in the last 168 hours.  Thyroid & Parathyroid:  No results for input(s): TSH, FREET4, S1SXZAK, D6KFJQF, THYROIDAB in the last 168 hours.  Vitamins:  No results for input(s): RGLLIFCA10, [id number] in the last 168 hours.    Medications: Pertinent Medications reviewed  Scheduled Meds:   enoxaparin  40 mg Subcutaneous Q12H    escitalopram oxalate  10 mg Oral Daily    furosemide  20 mg Oral Daily    hydroxychloroquine  200 mg Oral BID    insulin detemir U-100  10 Units Subcutaneous Daily     Continuous Infusions:  PRN Meds:.acetaminophen, albuterol **AND** MDI Q4H PRN, butalbital-acetaminophen-caffeine -40 mg, dextrose 50%, dextrose 50%, glucagon (human recombinant), glucose, glucose, hydrALAZINE, insulin aspart  U-100, LORazepam, melatonin, ondansetron, sodium chloride, sodium chloride 0.9%    Estimated/Assessed Needs  Weight Used For Calorie Calculations: 131 kg (288 lb 12.8 oz)  Energy Calorie Requirements (kcal): 4290-7472 (15-20 kcal/kg)  Energy Need Method: Kcal/kg  Protein Requirements: 99 g (1.5 g/kg) per IBW  Weight Used For Protein Calculations: 66 kg (145 lb 8.1 oz)(IBW)     Estimated Fluid Requirement Method: RDA Method  RDA Method (mL): 1965       Nutrition Prescription Ordered  Current Diet Order: 2000 ADA  Oral Nutrition Supplement: Glucerna TID (660 kcal, 30 g pro)    Evaluation of Received Nutrient/Fluid Intake  Energy Calories Required: not meeting needs  Protein Required: not meeting needs  Fluid Required: meeting needs  Tolerance: tolerating   No intake or output data in the 24 hours ending 03/24/20 1023     % Meal Intake: 50 - 75 %    Nutrition Risk  Level of Risk/Frequency of Follow-up: moderate - high     Monitor and Evaluation  Food and Nutrient Intake: food and beverage intake, energy intake  Food and Nutrient Adminstration: diet order  Physical Activity and Function: nutrition-related ADLs and IADLs  Anthropometric Measurements: weight, weight change, body mass index  Biochemical Data, Medical Tests and Procedures: electrolyte and renal panel, gastrointestinal profile, glucose/endocrine profile  Nutrition-Focused Physical Findings: overall appearance     Nutrition Follow-Up  RD Follow-up?: Yes    Damaris Campos RD 03/24/2020 10:23 AM

## 2020-03-24 NOTE — RESPIRATORY THERAPY
03/23/20 2140   Patient Assessment/Suction   Level of Consciousness (AVPU) alert   Respiratory Effort Normal;Unlabored   Expansion/Accessory Muscles/Retractions expansion symmetric;no retractions;no use of accessory muscles   All Lung Fields Breath Sounds diminished;clear   PRE-TX-O2   O2 Device (Oxygen Therapy) nasal cannula w/ humidification   Flow (L/min) 3   SpO2 95 %   Pulse Oximetry Type Intermittent   $ Pulse Oximetry - Multiple Charge Pulse Oximetry - Multiple   Pulse 87   Resp 16   Inhaler   $ Inhaler Charges PRN treatment not required   Daily Review of Necessity (Inhaler) completed   Respiratory Interventions   Cough And Deep Breathing done with encouragement   Breathing Techniques/Airway Clearance deep/controlled cough encouraged;diaphragmatic breathing promoted   Respiratory Evaluation   $ Care Plan Tech Time 15 min

## 2020-03-24 NOTE — PLAN OF CARE
03/24/20 1114   Patient Assessment/Suction   Level of Consciousness (AVPU) alert   Respiratory Effort Normal;Unlabored   Expansion/Accessory Muscles/Retractions expansion symmetric;no use of accessory muscles   All Lung Fields Breath Sounds clear;diminished   Rhythm/Pattern, Respiratory pattern regular   PRE-TX-O2   O2 Device (Oxygen Therapy) nasal cannula   $ Is the patient on Low Flow Oxygen? Yes   Flow (L/min) 3  (decreased to 2L)   SpO2 95 %   Pulse Oximetry Type Intermittent   $ Pulse Oximetry - Multiple Charge Pulse Oximetry - Multiple   Pulse 70   Resp 19   Positioning Sitting in bed   Inhaler   $ Inhaler Charges MDI (Metered Dose Inahler) Treatment;Given With Spacer   Daily Review of Necessity (Inhaler) completed   Respiratory Treatment Status (Inhaler) given   Treatment Route (Inhaler) spacer/holding chamber;breath hold   Patient Position (Inhaler) other (see comments)  (sitting in bed)   Post Treatment Assessment (Inhaler) breath sounds unchanged   Signs of Intolerance (Inhaler) none   Breath Sounds Post-Respiratory Treatment   Throughout All Fields Post-Treatment All Fields   Throughout All Fields Post-Treatment no change   Post-treatment Heart Rate (beats/min) 71   Post-treatment Resp Rate (breaths/min) 19   Respiratory Evaluation   $ Care Plan Tech Time 15 min

## 2020-03-24 NOTE — PLAN OF CARE
Problem: Adult Inpatient Plan of Care  Goal: Plan of Care Review  Outcome: Ongoing, Progressing  Goal: Patient-Specific Goal (Individualization)  Outcome: Ongoing, Progressing  Goal: Absence of Hospital-Acquired Illness or Injury  Outcome: Ongoing, Progressing  Goal: Optimal Comfort and Wellbeing  Outcome: Ongoing, Progressing  Goal: Readiness for Transition of Care  Outcome: Ongoing, Progressing  Goal: Rounds/Family Conference  Outcome: Ongoing, Progressing     Problem: Diabetes Comorbidity  Goal: Blood Glucose Level Within Desired Range  Outcome: Ongoing, Progressing     Problem: Fall Injury Risk  Goal: Absence of Fall and Fall-Related Injury  Outcome: Ongoing, Progressing     Problem: Infection  Goal: Infection Symptom Resolution  Outcome: Ongoing, Progressing     Problem: Constipation  Goal: Effective Bowel Elimination  Outcome: Ongoing, Progressing     Problem: Balance Impairment (Functional Deficit)  Goal: Improved Balance and Postural Control  Outcome: Ongoing, Progressing     Problem: Coordination Impairment (Functional Deficit)  Goal: Optimal Coordination  Outcome: Ongoing, Progressing     Problem: Muscle Strength Impairment  Goal: Improved Muscle Strength  Outcome: Ongoing, Progressing     Problem: Oral Intake Inadequate  Goal: Improved Oral Intake  Outcome: Ongoing, Progressing

## 2020-03-25 VITALS
BODY MASS INDEX: 42.78 KG/M2 | WEIGHT: 288.81 LBS | OXYGEN SATURATION: 93 % | HEIGHT: 69 IN | SYSTOLIC BLOOD PRESSURE: 138 MMHG | DIASTOLIC BLOOD PRESSURE: 78 MMHG | TEMPERATURE: 98 F | HEART RATE: 79 BPM | RESPIRATION RATE: 19 BRPM

## 2020-03-25 PROBLEM — A41.9 SEPSIS DUE TO PNEUMONIA: Status: RESOLVED | Noted: 2020-03-14 | Resolved: 2020-03-25

## 2020-03-25 PROBLEM — J18.9 SEPSIS DUE TO PNEUMONIA: Status: RESOLVED | Noted: 2020-03-14 | Resolved: 2020-03-25

## 2020-03-25 PROBLEM — U07.1 PNEUMONIA DUE TO COVID-19 VIRUS: Status: RESOLVED | Noted: 2020-03-24 | Resolved: 2020-03-25

## 2020-03-25 PROBLEM — J12.82 PNEUMONIA DUE TO COVID-19 VIRUS: Status: RESOLVED | Noted: 2020-03-24 | Resolved: 2020-03-25

## 2020-03-25 LAB
ANION GAP SERPL CALC-SCNC: 9 MMOL/L (ref 8–16)
BASOPHILS # BLD AUTO: 0.03 K/UL (ref 0–0.2)
BASOPHILS NFR BLD: 0.5 % (ref 0–1.9)
BUN SERPL-MCNC: 8 MG/DL (ref 6–20)
CALCIUM SERPL-MCNC: 8.8 MG/DL (ref 8.7–10.5)
CHLORIDE SERPL-SCNC: 104 MMOL/L (ref 95–110)
CO2 SERPL-SCNC: 25 MMOL/L (ref 23–29)
CREAT SERPL-MCNC: 0.7 MG/DL (ref 0.5–1.4)
DIFFERENTIAL METHOD: ABNORMAL
EOSINOPHIL # BLD AUTO: 0.3 K/UL (ref 0–0.5)
EOSINOPHIL NFR BLD: 5.3 % (ref 0–8)
ERYTHROCYTE [DISTWIDTH] IN BLOOD BY AUTOMATED COUNT: 13.7 % (ref 11.5–14.5)
EST. GFR  (AFRICAN AMERICAN): >60 ML/MIN/1.73 M^2
EST. GFR  (NON AFRICAN AMERICAN): >60 ML/MIN/1.73 M^2
GLUCOSE SERPL-MCNC: 124 MG/DL (ref 70–110)
GLUCOSE SERPL-MCNC: 124 MG/DL (ref 70–110)
HCT VFR BLD AUTO: 32 % (ref 37–48.5)
HGB BLD-MCNC: 9.9 G/DL (ref 12–16)
IMM GRANULOCYTES # BLD AUTO: 0.1 K/UL (ref 0–0.04)
IMM GRANULOCYTES NFR BLD AUTO: 1.8 % (ref 0–0.5)
LYMPHOCYTES # BLD AUTO: 1.4 K/UL (ref 1–4.8)
LYMPHOCYTES NFR BLD: 25.9 % (ref 18–48)
MCH RBC QN AUTO: 25.4 PG (ref 27–31)
MCHC RBC AUTO-ENTMCNC: 30.9 G/DL (ref 32–36)
MCV RBC AUTO: 82 FL (ref 82–98)
MONOCYTES # BLD AUTO: 0.4 K/UL (ref 0.3–1)
MONOCYTES NFR BLD: 7.4 % (ref 4–15)
NEUTROPHILS # BLD AUTO: 3.3 K/UL (ref 1.8–7.7)
NEUTROPHILS NFR BLD: 59.1 % (ref 38–73)
NRBC BLD-RTO: 1 /100 WBC
PLATELET # BLD AUTO: 325 K/UL (ref 150–350)
PMV BLD AUTO: 9.5 FL (ref 9.2–12.9)
POTASSIUM SERPL-SCNC: 3.5 MMOL/L (ref 3.5–5.1)
RBC # BLD AUTO: 3.89 M/UL (ref 4–5.4)
SODIUM SERPL-SCNC: 138 MMOL/L (ref 136–145)
WBC # BLD AUTO: 5.52 K/UL (ref 3.9–12.7)

## 2020-03-25 PROCEDURE — 82962 GLUCOSE BLOOD TEST: CPT

## 2020-03-25 PROCEDURE — 63600175 PHARM REV CODE 636 W HCPCS: Performed by: STUDENT IN AN ORGANIZED HEALTH CARE EDUCATION/TRAINING PROGRAM

## 2020-03-25 PROCEDURE — 99900035 HC TECH TIME PER 15 MIN (STAT)

## 2020-03-25 PROCEDURE — 25000003 PHARM REV CODE 250: Performed by: STUDENT IN AN ORGANIZED HEALTH CARE EDUCATION/TRAINING PROGRAM

## 2020-03-25 PROCEDURE — 85025 COMPLETE CBC W/AUTO DIFF WBC: CPT

## 2020-03-25 PROCEDURE — 94761 N-INVAS EAR/PLS OXIMETRY MLT: CPT

## 2020-03-25 PROCEDURE — 36415 COLL VENOUS BLD VENIPUNCTURE: CPT

## 2020-03-25 PROCEDURE — 25000003 PHARM REV CODE 250: Performed by: INTERNAL MEDICINE

## 2020-03-25 PROCEDURE — 27000221 HC OXYGEN, UP TO 24 HOURS

## 2020-03-25 PROCEDURE — 80048 BASIC METABOLIC PNL TOTAL CA: CPT

## 2020-03-25 RX ADMIN — INSULIN DETEMIR 10 UNITS: 100 INJECTION, SOLUTION SUBCUTANEOUS at 09:03

## 2020-03-25 RX ADMIN — HYDROXYCHLOROQUINE SULFATE 200 MG: 200 TABLET, FILM COATED ORAL at 09:03

## 2020-03-25 RX ADMIN — ESCITALOPRAM OXALATE 10 MG: 10 TABLET ORAL at 09:03

## 2020-03-25 RX ADMIN — FUROSEMIDE 20 MG: 20 TABLET ORAL at 09:03

## 2020-03-25 RX ADMIN — ENOXAPARIN SODIUM 40 MG: 100 INJECTION SUBCUTANEOUS at 09:03

## 2020-03-25 NOTE — PROGRESS NOTES
[facility] Medicine  Progress Note    Patient Name: Patria Bennett  MRN: [id number]  Patient Class: IP- Inpatient   Admission Date: 3/14/2020  Length of Stay: 10 days  Attending Physician: Edson Liriano MD  Primary Care Provider: DINAH Leos        Subjective:     Principal Problem:Sepsis due to pneumonia        HPI:  48 yo F with PMH of HTN, DM presenting to ER c/o flu-like symptoms x 5 days. Pt reports subjective fever/chills, cough,, fatigue, generalized muscle aches, sob .  Patient states that symptoms started Tuesday with cough and mild shortness of breath.  She states that Wednesday she felt generalized fatigue and slept for much of the day.  On Thursday if symptoms worsen she went to urgent care where she was tested negative for influenza a and was diagnosed with atypical pneumonia and started on azithromycin.  She received steroid injection at urgent care.  Patient states that she had worsening shortness of breath and fevers today which prompted her to come to ER.  Patient denies any known sick contact or COVID-19 contacts.  She works at [employer] in the [place] and is in contact with many people.    In ED:  Temperature 101.3°, tachycardic to 115, respiratory rate 20.  LA 2.1, WBC 6.38, lymphocyte 1.1.  Influenza A/B negative, PCT wnl.  CTA chest no evidence of PE however patchy ground-glass opacities with patchy consolidation in bilateral lower lobes.  sepsis protocol/EGDT started in the ED with broad-spectrum antibiotics levofloxacin, Zosyn and vancomycin and IVF: 30 cc/kg.  COVID swab obtained and sent to lab gian.       Overview/Hospital Course:  -3/15/20-Stable today yet SOB with little movement. Continue to have fever. Labs stable    03/24:Still in need of O2  Overall feels better . Patient denies any weakness    Interval History:     Review of Systems   Constitutional: Negative for activity change and appetite change.   HENT: Negative for congestion and  dental problem.    Eyes: Negative for discharge and itching.   Respiratory: Positive for cough and shortness of breath.    Cardiovascular: Negative for chest pain.   Gastrointestinal: Negative for abdominal distention and abdominal pain.   Endocrine: Negative for cold intolerance.   Genitourinary: Negative for difficulty urinating and dysuria.   Musculoskeletal: Negative for arthralgias and back pain.   Skin: Negative for color change.   Neurological: Negative for dizziness and facial asymmetry.   Hematological: Negative for adenopathy.   Psychiatric/Behavioral: Negative for agitation and behavioral problems.     Objective:     Vital Signs (Most Recent):  Temp: 98.2 °F (36.8 °C) (03/24/20 1700)  Pulse: 73 (03/24/20 1700)  Resp: 19 (03/24/20 1114)  BP: (!) 140/75 (03/24/20 1700)  SpO2: (!) 94 % (03/24/20 1700) Vital Signs (24h Range):  Temp:  [98.2 °F (36.8 °C)-98.8 °F (37.1 °C)] 98.2 °F (36.8 °C)  Pulse:  [] 73  Resp:  [16-19] 19  SpO2:  [93 %-95 %] 94 %  BP: (113-147)/(58-85) 140/75     Weight: 131 kg (288 lb 12.8 oz)  Body mass index is 42.65 kg/m².    Intake/Output Summary (Last 24 hours) at 3/24/2020 1940  Last data filed at 3/24/2020 1700  Gross per 24 hour   Intake 300 ml   Output --   Net 300 ml      Physical Exam   Constitutional: She is oriented to person, place, and time. No distress.   HENT:   Right Ear: External ear normal.   Left Ear: External ear normal.   Eyes: Conjunctivae are normal.   Neck: Neck supple.   Cardiovascular: Normal rate.   Pulmonary/Chest: Effort normal.   Abdominal: Soft. Bowel sounds are normal.   Musculoskeletal: She exhibits no edema.   Neurological: She is alert and oriented to person, place, and time.   Skin: Skin is warm.   Psychiatric: She has a normal mood and affect.   Nursing note and vitals reviewed.      Significant Labs:   CBC:   Recent Labs   Lab 03/23/20  0435 03/24/20  0441   WBC 5.68 5.27   HGB 10.1* 10.1*   HCT 32.1* 32.7*    301     CMP:   Recent Labs    Lab 03/23/20  0435 03/24/20  0441    140   K 3.7 3.5    104   CO2 29 26   * 132*   BUN 9 9   CREATININE 0.7 0.7   CALCIUM 8.8 8.8   ANIONGAP 6* 10   EGFRNONAA >60.0 >60.0       Significant Imaging: I have reviewed all pertinent imaging results/findings within the past 24 hours.      Assessment/Plan:      * Sepsis due to pneumonia  Patient admitted with COVID pneumonia   Successfully completed Rx  Tmrw will be her last day of Having Plaquenil      Acute hypoxemic respiratory failure  In Need of O2  CM/SW consulted for Home O2      Pneumonia due to Covid-19 Virus  As above  If Stable and after Home O2 is obtained can go Home      Morbid obesity  Need Therapeutic Life style changes      BRANDY (generalized anxiety disorder)  Stable  Chronic medical condition  Continuing home medication          Type 2 diabetes mellitus without complication  Chronic medical condition  Stable issue            Essential hypertension  Chronic medical condition  Stable BP      Gastroesophageal reflux disease  Stable        VTE Risk Mitigation (From admission, onward)         Ordered     enoxaparin injection 40 mg  Every 12 hours      03/15/20 0252     IP VTE HIGH RISK PATIENT  Once      03/15/20 0244                      Edson Liriano MD  Department of Hospital Medicine   [facility]

## 2020-03-25 NOTE — SUBJECTIVE & OBJECTIVE
Interval History:     Review of Systems   Constitutional: Negative for activity change and appetite change.   HENT: Negative for congestion and dental problem.    Eyes: Negative for discharge and itching.   Respiratory: Positive for cough and shortness of breath.    Cardiovascular: Negative for chest pain.   Gastrointestinal: Negative for abdominal distention and abdominal pain.   Endocrine: Negative for cold intolerance.   Genitourinary: Negative for difficulty urinating and dysuria.   Musculoskeletal: Negative for arthralgias and back pain.   Skin: Negative for color change.   Neurological: Negative for dizziness and facial asymmetry.   Hematological: Negative for adenopathy.   Psychiatric/Behavioral: Negative for agitation and behavioral problems.     Objective:     Vital Signs (Most Recent):  Temp: 98.2 °F (36.8 °C) (03/24/20 1700)  Pulse: 73 (03/24/20 1700)  Resp: 19 (03/24/20 1114)  BP: (!) 140/75 (03/24/20 1700)  SpO2: (!) 94 % (03/24/20 1700) Vital Signs (24h Range):  Temp:  [98.2 °F (36.8 °C)-98.8 °F (37.1 °C)] 98.2 °F (36.8 °C)  Pulse:  [] 73  Resp:  [16-19] 19  SpO2:  [93 %-95 %] 94 %  BP: (113-147)/(58-85) 140/75     Weight: 131 kg (288 lb 12.8 oz)  Body mass index is 42.65 kg/m².    Intake/Output Summary (Last 24 hours) at 3/24/2020 1940  Last data filed at 3/24/2020 1700  Gross per 24 hour   Intake 300 ml   Output --   Net 300 ml      Physical Exam   Constitutional: She is oriented to person, place, and time. No distress.   HENT:   Right Ear: External ear normal.   Left Ear: External ear normal.   Eyes: Conjunctivae are normal.   Neck: Neck supple.   Cardiovascular: Normal rate.   Pulmonary/Chest: Effort normal.   Abdominal: Soft. Bowel sounds are normal.   Musculoskeletal: She exhibits no edema.   Neurological: She is alert and oriented to person, place, and time.   Skin: Skin is warm.   Psychiatric: She has a normal mood and affect.   Nursing note and vitals reviewed.      Significant Labs:    CBC:   Recent Labs   Lab 03/23/20  0435 03/24/20  0441   WBC 5.68 5.27   HGB 10.1* 10.1*   HCT 32.1* 32.7*    301     CMP:   Recent Labs   Lab 03/23/20  0435 03/24/20  0441    140   K 3.7 3.5    104   CO2 29 26   * 132*   BUN 9 9   CREATININE 0.7 0.7   CALCIUM 8.8 8.8   ANIONGAP 6* 10   EGFRNONAA >60.0 >60.0       Significant Imaging: I have reviewed all pertinent imaging results/findings within the past 24 hours.

## 2020-03-25 NOTE — PLAN OF CARE
03/25/20 0707   Patient Assessment/Suction   Level of Consciousness (AVPU) alert   Respiratory Effort Normal;Unlabored   Expansion/Accessory Muscles/Retractions no use of accessory muscles;no retractions   Rhythm/Pattern, Respiratory unlabored;pattern regular;depth regular   PRE-TX-O2   O2 Device (Oxygen Therapy) nasal cannula   $ Is the patient on Low Flow Oxygen? Yes   Flow (L/min) 2   SpO2 (!) 93 %   Pulse Oximetry Type Intermittent   $ Pulse Oximetry - Multiple Charge Pulse Oximetry - Multiple   Pulse 86   Resp 18   Inhaler   $ Inhaler Charges PRN treatment not required   Respiratory Evaluation   $ Care Plan Tech Time 15 min   Evaluation For   (care plan)

## 2020-03-25 NOTE — ASSESSMENT & PLAN NOTE
Patient admitted with COVID pneumonia   Successfully completed Rx  Tmrw will be her last day of Having Plaquenil

## 2020-03-25 NOTE — PLAN OF CARE
Problem: Adult Inpatient Plan of Care  Goal: Plan of Care Review  Outcome: Met  Goal: Patient-Specific Goal (Individualization)  Outcome: Met  Goal: Absence of Hospital-Acquired Illness or Injury  Outcome: Met  Goal: Optimal Comfort and Wellbeing  Outcome: Met  Goal: Readiness for Transition of Care  Outcome: Met  Goal: Rounds/Family Conference  Outcome: Met     Problem: Diabetes Comorbidity  Goal: Blood Glucose Level Within Desired Range  Outcome: Met     Problem: Fall Injury Risk  Goal: Absence of Fall and Fall-Related Injury  Outcome: Met     Problem: Infection  Goal: Infection Symptom Resolution  Outcome: Met     Problem: Constipation  Goal: Effective Bowel Elimination  Outcome: Met     Problem: Balance Impairment (Functional Deficit)  Goal: Improved Balance and Postural Control  Outcome: Met     Problem: Coordination Impairment (Functional Deficit)  Goal: Optimal Coordination  Outcome: Met     Problem: Muscle Strength Impairment  Goal: Improved Muscle Strength  Outcome: Met     Problem: Oral Intake Inadequate  Goal: Improved Oral Intake  Outcome: Met

## 2020-03-25 NOTE — PLAN OF CARE
03/25/20 1143   Final Note   Assessment Type Final Discharge Note   Anticipated Discharge Disposition Home   Post-Acute Status   Post-Acute Authorization HME   HME Status Set-up Complete       [facility] O[facility] nurses station [facility]

## 2020-03-26 NOTE — DISCHARGE SUMMARY
Formerly [facility], [facility] Medicine  Discharge Summary      Patient Name: Patria Bennett  MRN: [id number]  Admission Date: 3/14/2020  Hospital Length of Stay: 11 days  Discharge Date and Time:  03/25/2020 8:05 PM  Attending Physician: No att. providers found   Discharging Provider: Edson Liriano MD  Primary Care Provider: DINAH Leos      HPI:   48 yo F with PMH of HTN, DM presenting to ER c/o flu-like symptoms x 5 days. Pt reports subjective fever/chills, cough,, fatigue, generalized muscle aches, sob .  Patient states that symptoms started Tuesday with cough and mild shortness of breath.  She states that Wednesday she felt generalized fatigue and slept for much of the day.  On Thursday if symptoms worsen she went to urgent care where she was tested negative for influenza a and was diagnosed with atypical pneumonia and started on azithromycin.  She received steroid injection at urgent care.  Patient states that she had worsening shortness of breath and fevers today which prompted her to come to ER.  Patient denies any known sick contact or COVID-19 contacts.  She works at [employer] in the [employer] and is in contact with many people.    In ED:  Temperature 101.3°, tachycardic to 115, respiratory rate 20.  LA 2.1, WBC 6.38, lymphocyte 1.1.  Influenza A/B negative, PCT wnl.  CTA chest no evidence of PE however patchy ground-glass opacities with patchy consolidation in bilateral lower lobes.  sepsis protocol/EGDT started in the ED with broad-spectrum antibiotics levofloxacin, Zosyn and vancomycin and IVF: 30 cc/kg.  COVID swab obtained and sent to lab gian.       * No surgery found *      Hospital Course:   Patient admitted with Sepsis/Acute Hypoxic Resp Failure due to COVID19  She was treated adequately while she was in the hospital and was later Discharged to Home with Home O2     Consults:   Consults (From admission, onward)        Status Ordering Provider     Inpatient consult to  "Infectious Diseases  Once     Provider:  Marissa Alex MD    Completed FATMATA MONTOYA          No new Assessment & Plan notes have been filed under this hospital service since the last note was generated.  Service: Hospital Medicine    Final Active Diagnoses:    Diagnosis Date Noted POA    Acute hypoxemic respiratory failure [J96.01] 03/24/2020 Unknown    Morbid obesity [E66.01] 03/24/2020 Unknown    BRANDY (generalized anxiety disorder) [F41.1] 03/15/2020 Yes    Type 2 diabetes mellitus without complication [E11.9] 03/14/2020 Yes    Gastroesophageal reflux disease [K21.9] 10/09/2018 Yes    Essential hypertension [I10] 10/09/2018 Yes      Problems Resolved During this Admission:    Diagnosis Date Noted Date Resolved POA    PRINCIPAL PROBLEM:  Sepsis due to pneumonia [J18.9, A41.9] 03/14/2020 03/25/2020 Yes    Pneumonia due to Covid-19 Virus [J12.89, B97.29] 03/24/2020 03/25/2020 Unknown       Discharged Condition: good    Disposition: Home or Self Care    Follow Up:  Follow-up Information     DINAH Leos In 3 weeks.    Specialty:  Family Medicine  Contact information:  [address] [place] [address] [place] [address] [phone]                 Patient Instructions:      OXYGEN FOR HOME USE     Order Specific Question Answer Comments   Liter Flow 3    Duration Continuous    Qualifying SpO2: 82    Testing done at: [facility]    Device home concentrator with portable unit    Height: 5' 9" (1.753 m)    Weight: 131 kg (288 lb 12.8 oz)    Does patient have medical equipment at home? none    Alternative treatment measures have been tried or considered and deemed clinically ineffective. Yes      Diet diabetic     Activity as tolerated       Significant Diagnostic Studies: Labs:   CMP   Recent Labs   Lab 03/24/20  0441 03/25/20  0420    138   K 3.5 3.5    104   CO2 26 25   * 124*   BUN 9 8   CREATININE 0.7 0.7   CALCIUM 8.8 8.8   ANIONGAP 10 9   ESTGFRAFRICA >60.0 >60.0   EGFRNONAA >60.0 >60.0    " and CBC   Recent Labs   Lab 03/24/20  0441 03/25/20  0420   WBC 5.27 5.52   HGB 10.1* 9.9*   HCT 32.7* 32.0*    325       Pending Diagnostic Studies:     None         Medications:  Reconciled Home Medications:      Medication List      CONTINUE taking these medications    ferrous sulfate 325 mg (65 mg iron) Tab tablet  Commonly known as:  FEOSOL  Take 325 mg by mouth once daily.     GLYXAMBI 10-5 mg Tab  Generic drug:  empagliflozin-linagliptin  Take 10 mg by mouth once daily.     metFORMIN 1000 MG tablet  Commonly known as:  GLUCOPHAGE  Take 1,000 mg by mouth 2 (two) times daily with meals.     omeprazole 20 MG capsule  Commonly known as:  PRILOSEC  Take 20 mg by mouth once daily.     pioglitazone 30 MG tablet  Commonly known as:  ACTOS  Take 30 mg by mouth once daily.     TRULICITY 0.75 mg/0.5 mL Pnij  Generic drug:  dulaglutide  Inject 0.75 mg into the skin every 7 days.     valsartan-hydrochlorothiazide 160-25 mg per tablet  Commonly known as:  DIOVAN-HCT  Take 160 tablets by mouth once daily.        STOP taking these medications    albuterol 90 mcg/actuation inhaler  Commonly known as:  PROVENTIL HFA     azithromycin 250 MG tablet  Commonly known as:  ZITHROMAX            Indwelling Lines/Drains at time of discharge:   Lines/Drains/Airways     None                 Time spent on the discharge of patient: 25  minutes  Patient was seen and examined on the date of discharge and determined to be suitable for discharge.         Edson Liriano MD  Department of Hospital Medicine  [facility]

## 2021-03-01 ENCOUNTER — OFFICE VISIT (OUTPATIENT)
Dept: ENDOCRINOLOGY | Facility: CLINIC | Age: 51
End: 2021-03-01
Payer: OTHER GOVERNMENT

## 2021-03-01 VITALS
BODY MASS INDEX: 43.4 KG/M2 | TEMPERATURE: 98 F | OXYGEN SATURATION: 98 % | DIASTOLIC BLOOD PRESSURE: 90 MMHG | HEIGHT: 69 IN | HEART RATE: 88 BPM | SYSTOLIC BLOOD PRESSURE: 146 MMHG | WEIGHT: 293 LBS

## 2021-03-01 DIAGNOSIS — E01.0 THYROMEGALY: ICD-10-CM

## 2021-03-01 DIAGNOSIS — K46.9 HERNIA OF ABDOMINAL CAVITY: ICD-10-CM

## 2021-03-01 DIAGNOSIS — I10 ESSENTIAL HYPERTENSION: ICD-10-CM

## 2021-03-01 DIAGNOSIS — E55.9 HYPOVITAMINOSIS D: ICD-10-CM

## 2021-03-01 DIAGNOSIS — R61 NIGHT SWEATS: ICD-10-CM

## 2021-03-01 DIAGNOSIS — E66.9 OBESITY (BMI 30-39.9): ICD-10-CM

## 2021-03-01 DIAGNOSIS — Z79.4 TYPE 2 DIABETES MELLITUS WITHOUT COMPLICATION, WITH LONG-TERM CURRENT USE OF INSULIN: Primary | ICD-10-CM

## 2021-03-01 DIAGNOSIS — E11.9 TYPE 2 DIABETES MELLITUS WITHOUT COMPLICATION, WITH LONG-TERM CURRENT USE OF INSULIN: Primary | ICD-10-CM

## 2021-03-01 DIAGNOSIS — E78.5 HYPERLIPIDEMIA, UNSPECIFIED HYPERLIPIDEMIA TYPE: ICD-10-CM

## 2021-03-01 LAB — GLUCOSE SERPL-MCNC: 242 MG/DL (ref 70–110)

## 2021-03-01 PROCEDURE — 99999 PR PBB SHADOW E&M-EST. PATIENT-LVL IV: ICD-10-PCS | Mod: PBBFAC,,, | Performed by: PHYSICIAN ASSISTANT

## 2021-03-01 PROCEDURE — 99999 PR PBB SHADOW E&M-EST. PATIENT-LVL IV: CPT | Mod: PBBFAC,,, | Performed by: PHYSICIAN ASSISTANT

## 2021-03-01 PROCEDURE — 99214 OFFICE O/P EST MOD 30 MIN: CPT | Mod: PBBFAC,PO | Performed by: PHYSICIAN ASSISTANT

## 2021-03-01 PROCEDURE — 82962 GLUCOSE BLOOD TEST: CPT | Mod: PBBFAC,PO | Performed by: PHYSICIAN ASSISTANT

## 2021-03-01 PROCEDURE — 99204 OFFICE O/P NEW MOD 45 MIN: CPT | Mod: S$PBB,,, | Performed by: PHYSICIAN ASSISTANT

## 2021-03-01 PROCEDURE — 99204 PR OFFICE/OUTPT VISIT, NEW, LEVL IV, 45-59 MIN: ICD-10-PCS | Mod: S$PBB,,, | Performed by: PHYSICIAN ASSISTANT

## 2021-03-01 RX ORDER — SEMAGLUTIDE 1.34 MG/ML
INJECTION, SOLUTION SUBCUTANEOUS
Qty: 6 SYRINGE | Refills: 1 | Status: SHIPPED | OUTPATIENT
Start: 2021-03-01 | End: 2021-06-04

## 2021-03-01 RX ORDER — PIOGLITAZONEHYDROCHLORIDE 45 MG/1
45 TABLET ORAL DAILY
Qty: 90 TABLET | Refills: 3 | Status: SHIPPED | OUTPATIENT
Start: 2021-03-01 | End: 2021-09-21

## 2021-03-03 ENCOUNTER — LAB VISIT (OUTPATIENT)
Dept: LAB | Facility: HOSPITAL | Age: 51
End: 2021-03-03
Attending: PHYSICIAN ASSISTANT
Payer: OTHER GOVERNMENT

## 2021-03-03 DIAGNOSIS — E55.9 HYPOVITAMINOSIS D: ICD-10-CM

## 2021-03-03 DIAGNOSIS — E11.9 TYPE 2 DIABETES MELLITUS WITHOUT COMPLICATION, WITH LONG-TERM CURRENT USE OF INSULIN: ICD-10-CM

## 2021-03-03 DIAGNOSIS — R61 NIGHT SWEATS: ICD-10-CM

## 2021-03-03 DIAGNOSIS — Z79.4 TYPE 2 DIABETES MELLITUS WITHOUT COMPLICATION, WITH LONG-TERM CURRENT USE OF INSULIN: ICD-10-CM

## 2021-03-03 LAB
25(OH)D3+25(OH)D2 SERPL-MCNC: 21 NG/ML (ref 30–96)
ALBUMIN SERPL BCP-MCNC: 3.7 G/DL (ref 3.5–5.2)
ALP SERPL-CCNC: 47 U/L (ref 55–135)
ALT SERPL W/O P-5'-P-CCNC: 19 U/L (ref 10–44)
ANION GAP SERPL CALC-SCNC: 9 MMOL/L (ref 8–16)
AST SERPL-CCNC: 13 U/L (ref 10–40)
BILIRUB SERPL-MCNC: 0.4 MG/DL (ref 0.1–1)
BUN SERPL-MCNC: 13 MG/DL (ref 6–20)
CALCIUM SERPL-MCNC: 9.4 MG/DL (ref 8.7–10.5)
CHLORIDE SERPL-SCNC: 105 MMOL/L (ref 95–110)
CHOLEST SERPL-MCNC: 212 MG/DL (ref 120–199)
CHOLEST/HDLC SERPL: 5.7 {RATIO} (ref 2–5)
CO2 SERPL-SCNC: 25 MMOL/L (ref 23–29)
CREAT SERPL-MCNC: 0.8 MG/DL (ref 0.5–1.4)
EST. GFR  (AFRICAN AMERICAN): >60 ML/MIN/1.73 M^2
EST. GFR  (NON AFRICAN AMERICAN): >60 ML/MIN/1.73 M^2
ESTIMATED AVG GLUCOSE: 192 MG/DL (ref 68–131)
FSH SERPL-ACNC: 32.7 MIU/ML
GLUCOSE SERPL-MCNC: 229 MG/DL (ref 70–110)
HBA1C MFR BLD: 8.3 % (ref 4–5.6)
HDLC SERPL-MCNC: 37 MG/DL (ref 40–75)
HDLC SERPL: 17.5 % (ref 20–50)
LDLC SERPL CALC-MCNC: 137.2 MG/DL (ref 63–159)
LH SERPL-ACNC: 13.5 MIU/ML
NONHDLC SERPL-MCNC: 175 MG/DL
POTASSIUM SERPL-SCNC: 3.9 MMOL/L (ref 3.5–5.1)
PROT SERPL-MCNC: 7.2 G/DL (ref 6–8.4)
SODIUM SERPL-SCNC: 139 MMOL/L (ref 136–145)
T4 FREE SERPL-MCNC: 0.94 NG/DL (ref 0.71–1.51)
TRIGL SERPL-MCNC: 189 MG/DL (ref 30–150)
TSH SERPL DL<=0.005 MIU/L-ACNC: 0.89 UIU/ML (ref 0.4–4)

## 2021-03-03 PROCEDURE — 82306 VITAMIN D 25 HYDROXY: CPT | Performed by: PHYSICIAN ASSISTANT

## 2021-03-03 PROCEDURE — 80053 COMPREHEN METABOLIC PANEL: CPT | Performed by: PHYSICIAN ASSISTANT

## 2021-03-03 PROCEDURE — 84443 ASSAY THYROID STIM HORMONE: CPT | Performed by: PHYSICIAN ASSISTANT

## 2021-03-03 PROCEDURE — 83001 ASSAY OF GONADOTROPIN (FSH): CPT | Performed by: PHYSICIAN ASSISTANT

## 2021-03-03 PROCEDURE — 83002 ASSAY OF GONADOTROPIN (LH): CPT | Performed by: PHYSICIAN ASSISTANT

## 2021-03-03 PROCEDURE — 84439 ASSAY OF FREE THYROXINE: CPT | Performed by: PHYSICIAN ASSISTANT

## 2021-03-03 PROCEDURE — 36415 COLL VENOUS BLD VENIPUNCTURE: CPT | Performed by: PHYSICIAN ASSISTANT

## 2021-03-03 PROCEDURE — 83036 HEMOGLOBIN GLYCOSYLATED A1C: CPT | Performed by: PHYSICIAN ASSISTANT

## 2021-03-03 PROCEDURE — 80061 LIPID PANEL: CPT | Performed by: PHYSICIAN ASSISTANT

## 2021-03-05 ENCOUNTER — HOSPITAL ENCOUNTER (OUTPATIENT)
Dept: RADIOLOGY | Facility: HOSPITAL | Age: 51
Discharge: HOME | End: 2021-03-05
Attending: PHYSICIAN ASSISTANT
Payer: OTHER GOVERNMENT

## 2021-03-05 DIAGNOSIS — K46.9 HERNIA OF ABDOMINAL CAVITY: ICD-10-CM

## 2021-03-05 PROCEDURE — 76705 ECHO EXAM OF ABDOMEN: CPT | Mod: 26,,, | Performed by: RADIOLOGY

## 2021-03-05 PROCEDURE — 76705 ECHO EXAM OF ABDOMEN: CPT | Mod: TC

## 2021-03-05 PROCEDURE — 76705 US ABDOMEN LIMITED: ICD-10-PCS | Mod: 26,,, | Performed by: RADIOLOGY

## 2021-03-07 RX ORDER — ROSUVASTATIN CALCIUM 5 MG/1
5 TABLET, COATED ORAL DAILY
Qty: 90 TABLET | Refills: 3 | Status: SHIPPED | OUTPATIENT
Start: 2021-03-07 | End: 2021-06-15

## 2021-03-08 ENCOUNTER — TELEPHONE (OUTPATIENT)
Dept: FAMILY MEDICINE | Facility: CLINIC | Age: 51
End: 2021-03-08

## 2021-03-08 RX ORDER — ERGOCALCIFEROL 1.25 MG/1
50000 CAPSULE ORAL
COMMUNITY
End: 2021-06-04 | Stop reason: SDUPTHER

## 2021-05-10 ENCOUNTER — TELEPHONE (OUTPATIENT)
Dept: ENDOCRINOLOGY | Facility: CLINIC | Age: 51
End: 2021-05-10

## 2021-06-03 ENCOUNTER — HOSPITAL ENCOUNTER (OUTPATIENT)
Dept: RADIOLOGY | Facility: HOSPITAL | Age: 51
Discharge: HOME | End: 2021-06-03
Attending: PHYSICIAN ASSISTANT
Payer: OTHER GOVERNMENT

## 2021-06-03 DIAGNOSIS — E01.0 THYROMEGALY: ICD-10-CM

## 2021-06-03 PROCEDURE — 76536 US EXAM OF HEAD AND NECK: CPT | Mod: TC

## 2021-06-03 PROCEDURE — 76536 US SOFT TISSUE HEAD NECK THYROID: ICD-10-PCS | Mod: 26,,, | Performed by: RADIOLOGY

## 2021-06-03 PROCEDURE — 76536 US EXAM OF HEAD AND NECK: CPT | Mod: 26,,, | Performed by: RADIOLOGY

## 2021-06-04 ENCOUNTER — OFFICE VISIT (OUTPATIENT)
Dept: ENDOCRINOLOGY | Facility: CLINIC | Age: 51
End: 2021-06-04
Payer: OTHER GOVERNMENT

## 2021-06-04 VITALS
OXYGEN SATURATION: 95 % | BODY MASS INDEX: 42.97 KG/M2 | HEART RATE: 95 BPM | DIASTOLIC BLOOD PRESSURE: 84 MMHG | HEIGHT: 69 IN | TEMPERATURE: 98 F | SYSTOLIC BLOOD PRESSURE: 130 MMHG | WEIGHT: 290.13 LBS

## 2021-06-04 DIAGNOSIS — I10 ESSENTIAL HYPERTENSION: ICD-10-CM

## 2021-06-04 DIAGNOSIS — N95.1 PERIMENOPAUSE: ICD-10-CM

## 2021-06-04 DIAGNOSIS — Z79.4 TYPE 2 DIABETES MELLITUS WITHOUT COMPLICATION, WITH LONG-TERM CURRENT USE OF INSULIN: Primary | ICD-10-CM

## 2021-06-04 DIAGNOSIS — E53.8 VITAMIN B12 DEFICIENCY: ICD-10-CM

## 2021-06-04 DIAGNOSIS — E66.9 OBESITY (BMI 30-39.9): ICD-10-CM

## 2021-06-04 DIAGNOSIS — E04.9 GOITER: ICD-10-CM

## 2021-06-04 DIAGNOSIS — E11.9 TYPE 2 DIABETES MELLITUS WITHOUT COMPLICATION, WITH LONG-TERM CURRENT USE OF INSULIN: Primary | ICD-10-CM

## 2021-06-04 DIAGNOSIS — E55.9 HYPOVITAMINOSIS D: ICD-10-CM

## 2021-06-04 DIAGNOSIS — E78.5 HYPERLIPIDEMIA, UNSPECIFIED HYPERLIPIDEMIA TYPE: ICD-10-CM

## 2021-06-04 PROCEDURE — 99214 OFFICE O/P EST MOD 30 MIN: CPT | Mod: S$PBB,,, | Performed by: PHYSICIAN ASSISTANT

## 2021-06-04 PROCEDURE — 99214 PR OFFICE/OUTPT VISIT, EST, LEVL IV, 30-39 MIN: ICD-10-PCS | Mod: S$PBB,,, | Performed by: PHYSICIAN ASSISTANT

## 2021-06-04 PROCEDURE — 99214 OFFICE O/P EST MOD 30 MIN: CPT | Mod: PBBFAC,PO | Performed by: PHYSICIAN ASSISTANT

## 2021-06-04 PROCEDURE — 99999 PR PBB SHADOW E&M-EST. PATIENT-LVL IV: ICD-10-PCS | Mod: PBBFAC,,, | Performed by: PHYSICIAN ASSISTANT

## 2021-06-04 PROCEDURE — 99999 PR PBB SHADOW E&M-EST. PATIENT-LVL IV: CPT | Mod: PBBFAC,,, | Performed by: PHYSICIAN ASSISTANT

## 2021-06-04 RX ORDER — SEMAGLUTIDE 1.34 MG/ML
1 INJECTION, SOLUTION SUBCUTANEOUS
Qty: 2 PEN | Refills: 11 | Status: SHIPPED | OUTPATIENT
Start: 2021-06-04 | End: 2022-05-17

## 2021-06-04 RX ORDER — ERGOCALCIFEROL 1.25 MG/1
CAPSULE ORAL
Qty: 36 CAPSULE | Refills: 3 | Status: SHIPPED | OUTPATIENT
Start: 2021-06-04 | End: 2022-08-24 | Stop reason: SDUPTHER

## 2021-06-04 RX ORDER — EMPAGLIFLOZIN 10 MG/1
10 TABLET, FILM COATED ORAL DAILY
Qty: 90 TABLET | Refills: 0 | Status: SHIPPED | OUTPATIENT
Start: 2021-06-04 | End: 2021-09-21

## 2021-06-08 ENCOUNTER — LAB VISIT (OUTPATIENT)
Dept: LAB | Facility: HOSPITAL | Age: 51
End: 2021-06-08
Attending: PHYSICIAN ASSISTANT
Payer: OTHER GOVERNMENT

## 2021-06-08 DIAGNOSIS — Z79.4 TYPE 2 DIABETES MELLITUS WITHOUT COMPLICATION, WITH LONG-TERM CURRENT USE OF INSULIN: ICD-10-CM

## 2021-06-08 DIAGNOSIS — E11.9 TYPE 2 DIABETES MELLITUS WITHOUT COMPLICATION, WITH LONG-TERM CURRENT USE OF INSULIN: ICD-10-CM

## 2021-06-08 DIAGNOSIS — E53.8 VITAMIN B12 DEFICIENCY: ICD-10-CM

## 2021-06-08 DIAGNOSIS — E78.5 HYPERLIPIDEMIA, UNSPECIFIED HYPERLIPIDEMIA TYPE: ICD-10-CM

## 2021-06-08 LAB
ALBUMIN SERPL BCP-MCNC: 3.5 G/DL (ref 3.5–5.2)
ALP SERPL-CCNC: 48 U/L (ref 55–135)
ALT SERPL W/O P-5'-P-CCNC: 22 U/L (ref 10–44)
AMYLASE SERPL-CCNC: 70 U/L (ref 20–110)
ANION GAP SERPL CALC-SCNC: 11 MMOL/L (ref 8–16)
AST SERPL-CCNC: 17 U/L (ref 10–40)
BILIRUB SERPL-MCNC: 0.6 MG/DL (ref 0.1–1)
BUN SERPL-MCNC: 19 MG/DL (ref 6–20)
CALCIUM SERPL-MCNC: 9.7 MG/DL (ref 8.7–10.5)
CHLORIDE SERPL-SCNC: 104 MMOL/L (ref 95–110)
CHOLEST SERPL-MCNC: 235 MG/DL (ref 120–199)
CHOLEST/HDLC SERPL: 5.1 {RATIO} (ref 2–5)
CO2 SERPL-SCNC: 22 MMOL/L (ref 23–29)
CREAT SERPL-MCNC: 0.8 MG/DL (ref 0.5–1.4)
EST. GFR  (AFRICAN AMERICAN): >60 ML/MIN/1.73 M^2
EST. GFR  (NON AFRICAN AMERICAN): >60 ML/MIN/1.73 M^2
ESTIMATED AVG GLUCOSE: 209 MG/DL (ref 68–131)
GLUCOSE SERPL-MCNC: 154 MG/DL (ref 70–110)
HBA1C MFR BLD: 8.9 % (ref 4–5.6)
HDLC SERPL-MCNC: 46 MG/DL (ref 40–75)
HDLC SERPL: 19.6 % (ref 20–50)
LDLC SERPL CALC-MCNC: 157.8 MG/DL (ref 63–159)
LIPASE SERPL-CCNC: 63 U/L (ref 4–60)
NONHDLC SERPL-MCNC: 189 MG/DL
POTASSIUM SERPL-SCNC: 4.1 MMOL/L (ref 3.5–5.1)
PROT SERPL-MCNC: 7.1 G/DL (ref 6–8.4)
SODIUM SERPL-SCNC: 137 MMOL/L (ref 136–145)
TRIGL SERPL-MCNC: 156 MG/DL (ref 30–150)
VIT B12 SERPL-MCNC: >2000 PG/ML (ref 210–950)

## 2021-06-08 PROCEDURE — 83036 HEMOGLOBIN GLYCOSYLATED A1C: CPT | Performed by: PHYSICIAN ASSISTANT

## 2021-06-08 PROCEDURE — 36415 COLL VENOUS BLD VENIPUNCTURE: CPT | Mod: PO | Performed by: PHYSICIAN ASSISTANT

## 2021-06-08 PROCEDURE — 82150 ASSAY OF AMYLASE: CPT | Performed by: PHYSICIAN ASSISTANT

## 2021-06-08 PROCEDURE — 80053 COMPREHEN METABOLIC PANEL: CPT | Performed by: PHYSICIAN ASSISTANT

## 2021-06-08 PROCEDURE — 80061 LIPID PANEL: CPT | Performed by: PHYSICIAN ASSISTANT

## 2021-06-08 PROCEDURE — 82607 VITAMIN B-12: CPT | Performed by: PHYSICIAN ASSISTANT

## 2021-06-08 PROCEDURE — 83690 ASSAY OF LIPASE: CPT | Performed by: PHYSICIAN ASSISTANT

## 2021-06-15 DIAGNOSIS — E78.5 HYPERLIPIDEMIA, UNSPECIFIED HYPERLIPIDEMIA TYPE: Primary | ICD-10-CM

## 2021-06-15 RX ORDER — ROSUVASTATIN CALCIUM 10 MG/1
10 TABLET, COATED ORAL DAILY
Qty: 90 TABLET | Refills: 3 | Status: SHIPPED | OUTPATIENT
Start: 2021-06-15 | End: 2021-09-21

## 2021-06-16 ENCOUNTER — HOSPITAL ENCOUNTER (OUTPATIENT)
Dept: RADIOLOGY | Facility: HOSPITAL | Age: 51
Discharge: HOME | End: 2021-06-16
Attending: PHYSICIAN ASSISTANT
Payer: OTHER GOVERNMENT

## 2021-06-16 DIAGNOSIS — E04.9 GOITER: ICD-10-CM

## 2021-06-16 PROCEDURE — 10006 PR FINE NEEDLE ASP BIOPSY, W/US GUIDANCE, EA ADDTL LESION: ICD-10-PCS | Mod: ,,, | Performed by: RADIOLOGY

## 2021-06-16 PROCEDURE — 10005 FNA BX W/US GDN 1ST LES: CPT | Mod: ,,, | Performed by: RADIOLOGY

## 2021-06-16 PROCEDURE — 10005 US FINE NEEDLE ASPIRATION THYROID, FIRST LESION: ICD-10-PCS | Mod: ,,, | Performed by: RADIOLOGY

## 2021-06-16 PROCEDURE — 10006 FNA BX W/US GDN EA ADDL: CPT

## 2021-06-16 PROCEDURE — 10005 FNA BX W/US GDN 1ST LES: CPT

## 2021-06-16 PROCEDURE — 88173 PR  INTERPRETATION OF FNA SMEAR: ICD-10-PCS | Mod: 26,,, | Performed by: PATHOLOGY

## 2021-06-16 PROCEDURE — 10006 FNA BX W/US GDN EA ADDL: CPT | Mod: ,,, | Performed by: RADIOLOGY

## 2021-06-16 PROCEDURE — 88173 CYTOPATH EVAL FNA REPORT: CPT | Performed by: PATHOLOGY

## 2021-06-16 PROCEDURE — 88173 CYTOPATH EVAL FNA REPORT: CPT | Mod: 26,,, | Performed by: PATHOLOGY

## 2021-06-21 LAB
FINAL PATHOLOGIC DIAGNOSIS: NORMAL
Lab: NORMAL

## 2021-09-14 ENCOUNTER — LAB VISIT (OUTPATIENT)
Dept: LAB | Facility: HOSPITAL | Age: 51
End: 2021-09-14
Attending: PHYSICIAN ASSISTANT
Payer: OTHER GOVERNMENT

## 2021-09-14 DIAGNOSIS — E11.9 TYPE 2 DIABETES MELLITUS WITHOUT COMPLICATION, WITH LONG-TERM CURRENT USE OF INSULIN: ICD-10-CM

## 2021-09-14 DIAGNOSIS — Z79.4 TYPE 2 DIABETES MELLITUS WITHOUT COMPLICATION, WITH LONG-TERM CURRENT USE OF INSULIN: ICD-10-CM

## 2021-09-14 DIAGNOSIS — E78.5 HYPERLIPIDEMIA, UNSPECIFIED HYPERLIPIDEMIA TYPE: ICD-10-CM

## 2021-09-14 LAB
ALBUMIN SERPL BCP-MCNC: 3.5 G/DL (ref 3.5–5.2)
ALP SERPL-CCNC: 44 U/L (ref 55–135)
ALT SERPL W/O P-5'-P-CCNC: 16 U/L (ref 10–44)
ANION GAP SERPL CALC-SCNC: 11 MMOL/L (ref 8–16)
AST SERPL-CCNC: 16 U/L (ref 10–40)
BILIRUB SERPL-MCNC: 0.5 MG/DL (ref 0.1–1)
BUN SERPL-MCNC: 16 MG/DL (ref 6–20)
CALCIUM SERPL-MCNC: 9.3 MG/DL (ref 8.7–10.5)
CHLORIDE SERPL-SCNC: 108 MMOL/L (ref 95–110)
CHOLEST SERPL-MCNC: 168 MG/DL (ref 120–199)
CHOLEST/HDLC SERPL: 4.3 {RATIO} (ref 2–5)
CO2 SERPL-SCNC: 21 MMOL/L (ref 23–29)
CREAT SERPL-MCNC: 0.8 MG/DL (ref 0.5–1.4)
EST. GFR  (AFRICAN AMERICAN): >60 ML/MIN/1.73 M^2
EST. GFR  (NON AFRICAN AMERICAN): >60 ML/MIN/1.73 M^2
ESTIMATED AVG GLUCOSE: 148 MG/DL (ref 68–131)
GLUCOSE SERPL-MCNC: 81 MG/DL (ref 70–110)
HBA1C MFR BLD: 6.8 % (ref 4–5.6)
HDLC SERPL-MCNC: 39 MG/DL (ref 40–75)
HDLC SERPL: 23.2 % (ref 20–50)
LDLC SERPL CALC-MCNC: 113.8 MG/DL (ref 63–159)
NONHDLC SERPL-MCNC: 129 MG/DL
POTASSIUM SERPL-SCNC: 4.2 MMOL/L (ref 3.5–5.1)
PROT SERPL-MCNC: 6.9 G/DL (ref 6–8.4)
SODIUM SERPL-SCNC: 140 MMOL/L (ref 136–145)
TRIGL SERPL-MCNC: 76 MG/DL (ref 30–150)

## 2021-09-14 PROCEDURE — 83036 HEMOGLOBIN GLYCOSYLATED A1C: CPT | Performed by: PHYSICIAN ASSISTANT

## 2021-09-14 PROCEDURE — 80061 LIPID PANEL: CPT | Performed by: PHYSICIAN ASSISTANT

## 2021-09-14 PROCEDURE — 80053 COMPREHEN METABOLIC PANEL: CPT | Performed by: PHYSICIAN ASSISTANT

## 2021-09-14 PROCEDURE — 36415 COLL VENOUS BLD VENIPUNCTURE: CPT | Mod: PO | Performed by: PHYSICIAN ASSISTANT

## 2021-09-21 ENCOUNTER — OFFICE VISIT (OUTPATIENT)
Dept: ENDOCRINOLOGY | Facility: CLINIC | Age: 51
End: 2021-09-21
Payer: OTHER GOVERNMENT

## 2021-09-21 VITALS
TEMPERATURE: 98 F | HEIGHT: 69 IN | HEART RATE: 103 BPM | WEIGHT: 292.25 LBS | BODY MASS INDEX: 43.28 KG/M2 | SYSTOLIC BLOOD PRESSURE: 130 MMHG | DIASTOLIC BLOOD PRESSURE: 80 MMHG | OXYGEN SATURATION: 96 %

## 2021-09-21 DIAGNOSIS — N95.1 PERIMENOPAUSE: ICD-10-CM

## 2021-09-21 DIAGNOSIS — E11.9 TYPE 2 DIABETES MELLITUS WITHOUT COMPLICATION, WITH LONG-TERM CURRENT USE OF INSULIN: Primary | ICD-10-CM

## 2021-09-21 DIAGNOSIS — E04.9 GOITER: ICD-10-CM

## 2021-09-21 DIAGNOSIS — I10 ESSENTIAL HYPERTENSION: ICD-10-CM

## 2021-09-21 DIAGNOSIS — E66.9 OBESITY (BMI 30-39.9): ICD-10-CM

## 2021-09-21 DIAGNOSIS — E78.5 HYPERLIPIDEMIA, UNSPECIFIED HYPERLIPIDEMIA TYPE: ICD-10-CM

## 2021-09-21 DIAGNOSIS — Z79.4 TYPE 2 DIABETES MELLITUS WITHOUT COMPLICATION, WITH LONG-TERM CURRENT USE OF INSULIN: Primary | ICD-10-CM

## 2021-09-21 PROCEDURE — 99999 PR PBB SHADOW E&M-EST. PATIENT-LVL IV: CPT | Mod: PBBFAC,,, | Performed by: PHYSICIAN ASSISTANT

## 2021-09-21 PROCEDURE — 99214 OFFICE O/P EST MOD 30 MIN: CPT | Mod: PBBFAC,PO | Performed by: PHYSICIAN ASSISTANT

## 2021-09-21 PROCEDURE — 99999 PR PBB SHADOW E&M-EST. PATIENT-LVL IV: ICD-10-PCS | Mod: PBBFAC,,, | Performed by: PHYSICIAN ASSISTANT

## 2021-09-21 PROCEDURE — 99214 PR OFFICE/OUTPT VISIT, EST, LEVL IV, 30-39 MIN: ICD-10-PCS | Mod: S$PBB,,, | Performed by: PHYSICIAN ASSISTANT

## 2021-09-21 PROCEDURE — 99214 OFFICE O/P EST MOD 30 MIN: CPT | Mod: S$PBB,,, | Performed by: PHYSICIAN ASSISTANT

## 2021-09-21 RX ORDER — EMPAGLIFLOZIN 10 MG/1
10 TABLET, FILM COATED ORAL DAILY
Qty: 90 TABLET | Refills: 0 | Status: CANCELLED | OUTPATIENT
Start: 2021-09-21

## 2021-09-21 RX ORDER — ROSUVASTATIN CALCIUM 20 MG/1
20 TABLET, COATED ORAL DAILY
Qty: 90 TABLET | Refills: 3 | Status: SHIPPED | OUTPATIENT
Start: 2021-09-21 | End: 2022-12-13

## 2021-09-21 RX ORDER — EMPAGLIFLOZIN 25 MG/1
25 TABLET, FILM COATED ORAL EVERY MORNING
Qty: 90 TABLET | Refills: 3 | Status: SHIPPED | OUTPATIENT
Start: 2021-09-21 | End: 2022-02-09 | Stop reason: SDUPTHER

## 2021-09-21 RX ORDER — PIOGLITAZONEHYDROCHLORIDE 30 MG/1
30 TABLET ORAL DAILY
Qty: 90 TABLET | Refills: 3 | Status: SHIPPED | OUTPATIENT
Start: 2021-09-21 | End: 2022-05-17 | Stop reason: SDUPTHER

## 2021-09-21 RX ORDER — GABAPENTIN 300 MG/1
CAPSULE ORAL
Qty: 90 CAPSULE | Refills: 3 | Status: SHIPPED | OUTPATIENT
Start: 2021-09-21 | End: 2022-08-24 | Stop reason: SDUPTHER

## 2021-09-22 ENCOUNTER — TELEPHONE (OUTPATIENT)
Dept: ENDOCRINOLOGY | Facility: CLINIC | Age: 51
End: 2021-09-22

## 2021-10-19 ENCOUNTER — OFFICE VISIT (OUTPATIENT)
Dept: OTOLARYNGOLOGY | Facility: CLINIC | Age: 51
End: 2021-10-19
Payer: OTHER GOVERNMENT

## 2021-10-19 VITALS — WEIGHT: 283.5 LBS | BODY MASS INDEX: 41.87 KG/M2

## 2021-10-19 DIAGNOSIS — E55.9 HYPOVITAMINOSIS D: ICD-10-CM

## 2021-10-19 DIAGNOSIS — E04.2 MULTINODULAR GOITER: Primary | ICD-10-CM

## 2021-10-19 DIAGNOSIS — E66.01 MORBID OBESITY: ICD-10-CM

## 2021-10-19 DIAGNOSIS — E11.9 TYPE 2 DIABETES MELLITUS WITHOUT COMPLICATION, WITH LONG-TERM CURRENT USE OF INSULIN: ICD-10-CM

## 2021-10-19 DIAGNOSIS — Z79.4 TYPE 2 DIABETES MELLITUS WITHOUT COMPLICATION, WITH LONG-TERM CURRENT USE OF INSULIN: ICD-10-CM

## 2021-10-19 DIAGNOSIS — E04.9 GOITER: ICD-10-CM

## 2021-10-19 PROCEDURE — 99215 OFFICE O/P EST HI 40 MIN: CPT | Mod: PBBFAC,PO | Performed by: OTOLARYNGOLOGY

## 2021-10-19 PROCEDURE — 99204 PR OFFICE/OUTPT VISIT, NEW, LEVL IV, 45-59 MIN: ICD-10-PCS | Mod: S$PBB,,, | Performed by: OTOLARYNGOLOGY

## 2021-10-19 PROCEDURE — 99204 OFFICE O/P NEW MOD 45 MIN: CPT | Mod: S$PBB,,, | Performed by: OTOLARYNGOLOGY

## 2021-10-19 PROCEDURE — 99999 PR PBB SHADOW E&M-EST. PATIENT-LVL V: ICD-10-PCS | Mod: PBBFAC,,, | Performed by: OTOLARYNGOLOGY

## 2021-10-19 PROCEDURE — 99999 PR PBB SHADOW E&M-EST. PATIENT-LVL V: CPT | Mod: PBBFAC,,, | Performed by: OTOLARYNGOLOGY

## 2021-10-19 RX ORDER — HYDROCODONE BITARTRATE AND ACETAMINOPHEN 7.5; 325 MG/1; MG/1
TABLET ORAL
COMMUNITY
Start: 2021-10-01 | End: 2021-11-30 | Stop reason: CLARIF

## 2021-10-21 DIAGNOSIS — Z01.818 PRE-OP TESTING: ICD-10-CM

## 2021-11-18 ENCOUNTER — TELEPHONE (OUTPATIENT)
Dept: OTOLARYNGOLOGY | Facility: CLINIC | Age: 51
End: 2021-11-18
Payer: OTHER GOVERNMENT

## 2021-11-29 ENCOUNTER — LAB VISIT (OUTPATIENT)
Dept: PRIMARY CARE CLINIC | Facility: CLINIC | Age: 51
End: 2021-11-29
Payer: OTHER GOVERNMENT

## 2021-11-29 DIAGNOSIS — Z01.818 PRE-OP TESTING: ICD-10-CM

## 2021-11-29 PROCEDURE — U0005 INFEC AGEN DETEC AMPLI PROBE: HCPCS | Performed by: OTOLARYNGOLOGY

## 2021-11-29 PROCEDURE — U0003 INFECTIOUS AGENT DETECTION BY NUCLEIC ACID (DNA OR RNA); SEVERE ACUTE RESPIRATORY SYNDROME CORONAVIRUS 2 (SARS-COV-2) (CORONAVIRUS DISEASE [COVID-19]), AMPLIFIED PROBE TECHNIQUE, MAKING USE OF HIGH THROUGHPUT TECHNOLOGIES AS DESCRIBED BY CMS-2020-01-R: HCPCS | Performed by: OTOLARYNGOLOGY

## 2021-11-30 LAB
SARS-COV-2 RNA RESP QL NAA+PROBE: NOT DETECTED
SARS-COV-2- CYCLE NUMBER: NORMAL

## 2021-12-22 ENCOUNTER — OFFICE VISIT (OUTPATIENT)
Dept: OTOLARYNGOLOGY | Facility: CLINIC | Age: 51
End: 2021-12-22
Payer: OTHER GOVERNMENT

## 2021-12-22 VITALS — BODY MASS INDEX: 42.24 KG/M2 | WEIGHT: 277.75 LBS

## 2021-12-22 DIAGNOSIS — E89.0 S/P PARTIAL THYROIDECTOMY: Primary | ICD-10-CM

## 2021-12-22 PROCEDURE — 99999 PR PBB SHADOW E&M-EST. PATIENT-LVL III: ICD-10-PCS | Mod: PBBFAC,,, | Performed by: OTOLARYNGOLOGY

## 2021-12-22 PROCEDURE — 99024 POSTOP FOLLOW-UP VISIT: CPT | Mod: ,,, | Performed by: OTOLARYNGOLOGY

## 2021-12-22 PROCEDURE — 99024 PR POST-OP FOLLOW-UP VISIT: ICD-10-PCS | Mod: ,,, | Performed by: OTOLARYNGOLOGY

## 2021-12-22 PROCEDURE — 99999 PR PBB SHADOW E&M-EST. PATIENT-LVL III: CPT | Mod: PBBFAC,,, | Performed by: OTOLARYNGOLOGY

## 2021-12-22 PROCEDURE — 99213 OFFICE O/P EST LOW 20 MIN: CPT | Mod: PBBFAC,PO | Performed by: OTOLARYNGOLOGY

## 2021-12-22 RX ORDER — PANTOPRAZOLE SODIUM 40 MG/1
40 TABLET, DELAYED RELEASE ORAL
COMMUNITY
Start: 2021-11-12 | End: 2023-01-06 | Stop reason: SDUPTHER

## 2022-01-26 ENCOUNTER — LAB VISIT (OUTPATIENT)
Dept: LAB | Facility: CLINIC | Age: 52
End: 2022-01-26
Payer: OTHER GOVERNMENT

## 2022-01-26 DIAGNOSIS — Z79.4 TYPE 2 DIABETES MELLITUS WITHOUT COMPLICATION, WITH LONG-TERM CURRENT USE OF INSULIN: ICD-10-CM

## 2022-01-26 DIAGNOSIS — N95.1 PERIMENOPAUSE: ICD-10-CM

## 2022-01-26 DIAGNOSIS — E11.9 TYPE 2 DIABETES MELLITUS WITHOUT COMPLICATION, WITH LONG-TERM CURRENT USE OF INSULIN: ICD-10-CM

## 2022-01-26 LAB
ALBUMIN SERPL BCP-MCNC: 4 G/DL (ref 3.5–5.2)
ALP SERPL-CCNC: 49 U/L (ref 55–135)
ALT SERPL W/O P-5'-P-CCNC: 16 U/L (ref 10–44)
ANION GAP SERPL CALC-SCNC: 12 MMOL/L (ref 8–16)
AST SERPL-CCNC: 17 U/L (ref 10–40)
BILIRUB SERPL-MCNC: 0.6 MG/DL (ref 0.1–1)
BUN SERPL-MCNC: 11 MG/DL (ref 6–20)
CALCIUM SERPL-MCNC: 9.6 MG/DL (ref 8.7–10.5)
CHLORIDE SERPL-SCNC: 108 MMOL/L (ref 95–110)
CHOLEST SERPL-MCNC: 147 MG/DL (ref 120–199)
CHOLEST/HDLC SERPL: 3.4 {RATIO} (ref 2–5)
CO2 SERPL-SCNC: 22 MMOL/L (ref 23–29)
CREAT SERPL-MCNC: 0.8 MG/DL (ref 0.5–1.4)
EST. GFR  (AFRICAN AMERICAN): >60 ML/MIN/1.73 M^2
EST. GFR  (NON AFRICAN AMERICAN): >60 ML/MIN/1.73 M^2
ESTIMATED AVG GLUCOSE: 123 MG/DL (ref 68–131)
FSH SERPL-ACNC: 36.33 MIU/ML
GLUCOSE SERPL-MCNC: 115 MG/DL (ref 70–110)
HBA1C MFR BLD: 5.9 % (ref 4–5.6)
HDLC SERPL-MCNC: 43 MG/DL (ref 40–75)
HDLC SERPL: 29.3 % (ref 20–50)
LDLC SERPL CALC-MCNC: 86.8 MG/DL (ref 63–159)
LH SERPL-ACNC: 18.9 MIU/ML
NONHDLC SERPL-MCNC: 104 MG/DL
POTASSIUM SERPL-SCNC: 4.2 MMOL/L (ref 3.5–5.1)
PROT SERPL-MCNC: 7.4 G/DL (ref 6–8.4)
SODIUM SERPL-SCNC: 142 MMOL/L (ref 136–145)
TRIGL SERPL-MCNC: 86 MG/DL (ref 30–150)
URATE SERPL-MCNC: 3.4 MG/DL (ref 2.4–5.7)

## 2022-01-26 PROCEDURE — 83002 ASSAY OF GONADOTROPIN (LH): CPT | Performed by: PHYSICIAN ASSISTANT

## 2022-01-26 PROCEDURE — 83001 ASSAY OF GONADOTROPIN (FSH): CPT | Performed by: PHYSICIAN ASSISTANT

## 2022-01-26 PROCEDURE — 36415 COLL VENOUS BLD VENIPUNCTURE: CPT | Mod: PN,,, | Performed by: PHYSICIAN ASSISTANT

## 2022-01-26 PROCEDURE — 83036 HEMOGLOBIN GLYCOSYLATED A1C: CPT | Performed by: PHYSICIAN ASSISTANT

## 2022-01-26 PROCEDURE — 80053 COMPREHEN METABOLIC PANEL: CPT | Performed by: PHYSICIAN ASSISTANT

## 2022-01-26 PROCEDURE — 36415 PR COLLECTION VENOUS BLOOD,VENIPUNCTURE: ICD-10-PCS | Mod: PN,,, | Performed by: PHYSICIAN ASSISTANT

## 2022-01-26 PROCEDURE — 80061 LIPID PANEL: CPT | Performed by: PHYSICIAN ASSISTANT

## 2022-01-26 PROCEDURE — 84550 ASSAY OF BLOOD/URIC ACID: CPT | Performed by: PHYSICIAN ASSISTANT

## 2022-02-09 ENCOUNTER — OFFICE VISIT (OUTPATIENT)
Dept: ENDOCRINOLOGY | Facility: CLINIC | Age: 52
End: 2022-02-09
Payer: OTHER GOVERNMENT

## 2022-02-09 VITALS
SYSTOLIC BLOOD PRESSURE: 136 MMHG | TEMPERATURE: 98 F | DIASTOLIC BLOOD PRESSURE: 80 MMHG | HEIGHT: 68 IN | BODY MASS INDEX: 42.2 KG/M2 | WEIGHT: 278.44 LBS | HEART RATE: 96 BPM | OXYGEN SATURATION: 96 %

## 2022-02-09 DIAGNOSIS — Z78.0 POSTMENOPAUSAL: ICD-10-CM

## 2022-02-09 DIAGNOSIS — E78.5 HYPERLIPIDEMIA, UNSPECIFIED HYPERLIPIDEMIA TYPE: ICD-10-CM

## 2022-02-09 DIAGNOSIS — Z79.4 TYPE 2 DIABETES MELLITUS WITH HYPERGLYCEMIA, WITH LONG-TERM CURRENT USE OF INSULIN: Primary | ICD-10-CM

## 2022-02-09 DIAGNOSIS — Z79.4 TYPE 2 DIABETES MELLITUS WITHOUT COMPLICATION, WITH LONG-TERM CURRENT USE OF INSULIN: ICD-10-CM

## 2022-02-09 DIAGNOSIS — E66.9 OBESITY (BMI 30-39.9): ICD-10-CM

## 2022-02-09 DIAGNOSIS — E04.9 GOITER: ICD-10-CM

## 2022-02-09 DIAGNOSIS — I10 ESSENTIAL HYPERTENSION: ICD-10-CM

## 2022-02-09 DIAGNOSIS — E11.9 TYPE 2 DIABETES MELLITUS WITHOUT COMPLICATION, WITH LONG-TERM CURRENT USE OF INSULIN: ICD-10-CM

## 2022-02-09 DIAGNOSIS — E11.42 TYPE 2 DIABETES MELLITUS WITH DIABETIC POLYNEUROPATHY, WITH LONG-TERM CURRENT USE OF INSULIN: ICD-10-CM

## 2022-02-09 DIAGNOSIS — Z79.4 TYPE 2 DIABETES MELLITUS WITH DIABETIC POLYNEUROPATHY, WITH LONG-TERM CURRENT USE OF INSULIN: ICD-10-CM

## 2022-02-09 DIAGNOSIS — E11.65 TYPE 2 DIABETES MELLITUS WITH HYPERGLYCEMIA, WITH LONG-TERM CURRENT USE OF INSULIN: Primary | ICD-10-CM

## 2022-02-09 PROCEDURE — 99213 OFFICE O/P EST LOW 20 MIN: CPT | Mod: S$PBB,,, | Performed by: PHYSICIAN ASSISTANT

## 2022-02-09 PROCEDURE — 99213 PR OFFICE/OUTPT VISIT, EST, LEVL III, 20-29 MIN: ICD-10-PCS | Mod: S$PBB,,, | Performed by: PHYSICIAN ASSISTANT

## 2022-02-09 PROCEDURE — 99999 PR PBB SHADOW E&M-EST. PATIENT-LVL IV: ICD-10-PCS | Mod: PBBFAC,,, | Performed by: PHYSICIAN ASSISTANT

## 2022-02-09 PROCEDURE — 99999 PR PBB SHADOW E&M-EST. PATIENT-LVL IV: CPT | Mod: PBBFAC,,, | Performed by: PHYSICIAN ASSISTANT

## 2022-02-09 PROCEDURE — 99214 OFFICE O/P EST MOD 30 MIN: CPT | Mod: PBBFAC,PO | Performed by: PHYSICIAN ASSISTANT

## 2022-02-09 RX ORDER — EMPAGLIFLOZIN 25 MG/1
25 TABLET, FILM COATED ORAL EVERY MORNING
Qty: 90 TABLET | Refills: 3 | Status: SHIPPED | OUTPATIENT
Start: 2022-02-09 | End: 2023-09-22 | Stop reason: SDUPTHER

## 2022-02-09 NOTE — PROGRESS NOTES
CC: This 51 y.o. female presents for management of Diabetes Mellitus  and chronic conditions pending review including HTN, HLP     HPI: was diagnosed with T2DM in >10 years on lab work.   Has never been hospitalized r/t DM.  Family hx of DM: parents, aunts, uncles  Fhx of thyroid disease: gf had thyroid cancer, aunts,   Denies missing doses of DM medication.   hypoglycemia at home: none  monitoring BG at home: daily before breakfast  Fastin-120s     Diet:   BF- eggs, turkey mackay, protein shake; sometimes skips   LH- snack  DN-fish and veg  Snacks - chips   Drink - mostly water     Exercise: Decreased exercise since thyroidectomy      CURRENT DM MEDS: Actos 30 mg qd, Ozempic 1 mg weekly, metformin 1000mg bid;     Not taking Jardiance 25 mg daily     Standards of Care:  Eye exam: Not in a while  Podiatry exam:   DE: last year     DEXA scan: never. FSH is elevated. LH is normal. She has been taking ergocalciferol 1x weekly for longer than one year.     MNG  Thyroid u/s  shows a 5.8 cm nodule in the left thyroid and a 2.2 cm mass in the right thyroid. + occ sob and hoarseness. No dysphagia. FNAs were benign of both nodules .     PMHx, PSHx: reviewed in epic.  Social Hx: no tobacco. Social drinker.       Wt Readings from Last 20 Encounters:   22 126.3 kg (278 lb 7.1 oz)   21 126 kg (277 lb 12.5 oz)   21 (!) 141 kg (310 lb 12.8 oz)   10/19/21 128.6 kg (283 lb 8.2 oz)   21 132.6 kg (292 lb 3.5 oz)   21 131.6 kg (290 lb 2 oz)   21 133.4 kg (294 lb 1.5 oz)   20 131 kg (288 lb 12.8 oz)   20 128.4 kg (283 lb)   19 128.8 kg (284 lb)   19 125.6 kg (277 lb)   09 (!) 139.5 kg (307 lb 10.4 oz)         ROS:   Gen: Appetite good, + wt loss (-17 lbs since 3/21) + fatigue/weakness post thyroidectomy  Skin: Skin is intact and heals well, no rashes, no hair changes   Eyes: Denies visual disturbances  Resp: no SOB or AGUILAR, no cough  Cardiac: chest  pain, no edema, +palpitations  GI:  No nausea or vomiting, diarrhea, + constipation, or abdominal pain.  /GYN:+ night sweats, No nocturia, burning or pain.   MS/Neuro: + numbness/ tingling in hands and feet; Gait steady, speech clear  Psych: Denies drug/ETOH abuse, no hx of depression.  Other systems: negative.     There were no vitals taken for this visit.      PE:  GENERAL: middle aged female,well developed, well nourished.  PSYCH: AAOx3, appropriate mood and affect, pleasant expression, conversant, appears relaxed, well groomed.   EYES: Conjunctiva, corneas clear  NECK: Supple, trachea midline,+ thyromegaly  CHEST: Resp even and unlabored, CTA bilateral.  CARDIAC: RRR, S1, S2 heard, no murmurs  VASCULAR: DP pulses +2/4 bilaterally, no edema.  NEURO: Gait steady, CN ll-Xll grossly intact  SKIN: Skin warm and dry no acanthosis nigracans.        T2DM with hyperglycemia, neuropathy-A1c is at goal. Discussed DM, progression of disease, long term complications, tx options. Increase Jardiance to 25 mg daily. Decrease Actos 30 mg daily. Continue Ozempic and Metformin. This will help pt lose weight which will also help decrease her blood sugar.  Discussed A1c and BG goals.    Neuropathy- Start gabapentin 300 mg nightly.  HTN -stable, continue meds as previously prescribed and monitor. Check bp daily and send log in one week.     HLP -elevated LDL , increase crestor to 20 mg daily, LFTs WNL. Recheck next time.    Nodular thyroid disease-FNAs were benign. S/p left thyroidectomy

## 2022-02-09 NOTE — PROGRESS NOTES
CC: This 51 y.o. female presents for management of Diabetes Mellitus  and chronic conditions pending review including HTN, HLP    HPI: was diagnosed with T2DM in >10 years on lab work.   Has never been hospitalized r/t DM.  Family hx of DM: parents, aunts, uncles  Fhx of thyroid disease: gf had thyroid cancer, aunts,   Denies missing doses of DM medication.   hypoglycemia at home: none  monitoring BG at home:  Fastins-120s    Diet:   BF- eggs, turkey mackay, protein shake  LH-skips  DN-fish and veg  Snacks  Avoids sugary beverages.     Exercise: She has been too tired to workout.    CURRENT DM MEDS: Actos 30 mg qd, Ozempic 1 mg weekly, metformin 1000 bid, Jardiance 25 mg daily,      Standards of Care:  Eye exam: Not in a while  Podiatry exam: Follows w/ Dr. Akira LIRA: last year    DEXA scan: never. FSH is elevated. LH is normal. She has been taking ergocalciferol 2x weekly for longer than one year.     MNG  S/p left thyroidectomy   +constipation, cold intolerance, mental fog, hair loss, fatigue.  Thyroid u/s  shows a 5.8 cm nodule in the left thyroid and a 2.2 cm mass in the right thyroid. + occ sob and hoarseness. No dysphagia. FNAs were benign of both nodules .     PMHx, PSHx: reviewed in epic.  Social Hx: no E/T use.    Wt Readings from Last 6 Encounters:   22 126.3 kg (278 lb 7.1 oz)   21 126 kg (277 lb 12.5 oz)   21 (!) 141 kg (310 lb 12.8 oz)   10/19/21 128.6 kg (283 lb 8.2 oz)   21 132.6 kg (292 lb 3.5 oz)   21 131.6 kg (290 lb 2 oz)      ROS:   Gen: Appetite good, + wt stable, denies fatigue and weakness.  Skin: Skin is intact and heals well, no rashes, no hair changes  Eyes: Denies visual disturbances  Resp: no SOB or AGUILAR, no cough  Cardiac: No palpitations, chest pain, no edema   GI:  No nausea or vomiting, diarrhea, constipation, or abdominal pain.  /GYN:+ night sweats, No nocturia, burning or pain.   MS/Neuro: Denies numbness/ tingling in BLE; Gait  "steady, speech clear  Psych: Denies drug/ETOH abuse, no hx of depression.  Other systems: negative.    /80 (BP Location: Left arm, Patient Position: Sitting, BP Method: Large (Manual))   Pulse 96   Temp 98 °F (36.7 °C) (Oral)   Ht 5' 8" (1.727 m)   Wt 126.3 kg (278 lb 7.1 oz)   SpO2 96%   BMI 42.34 kg/m²      PE:  GENERAL: middle aged female,well developed, well nourished.  PSYCH: AAOx3, appropriate mood and affect, pleasant expression, conversant, appears relaxed, well groomed.   EYES: Conjunctiva, corneas clear  NECK: Supple, trachea midline,+ thyromegaly  CHEST: Resp even and unlabored, CTA bilateral.  CARDIAC: RRR, S1, S2 heard, no murmurs  VASCULAR: DP pulses +2/4 bilaterally, no edema.  NEURO: Gait steady, CN ll-Xll grossly intact  SKIN: Skin warm and dry no acanthosis nigracans.  9/21  Foot Exam: no sores or macerations noted.     Protective Sensation (w/ 10 gram monofilament):  Right: Intact  Left: Intact    Visual Inspection:  Normal -  Bilateral and Nails Intact - without Evidence of Foot Deformity- Bilateral    Pedal Pulses:   Right: Present  Left: Present    Posterior tibialis:   Right:Present  Left: Present     Vibratory Sensation  Right:Positive  Left:Decreased    Personally reviewed labs below:    Lab Visit on 01/26/2022   Component Date Value Ref Range Status    Hemoglobin A1C 01/26/2022 5.9* 4.0 - 5.6 % Final    Comment: ADA Screening Guidelines:  5.7-6.4%  Consistent with prediabetes  >or=6.5%  Consistent with diabetes    High levels of fetal hemoglobin interfere with the HbA1C  assay. Heterozygous hemoglobin variants (HbS, HgC, etc)do  not significantly interfere with this assay.   However, presence of multiple variants may affect accuracy.      Estimated Avg Glucose 01/26/2022 123  68 - 131 mg/dL Final    Cholesterol 01/26/2022 147  120 - 199 mg/dL Final    Comment: The National Cholesterol Education Program (NCEP) has set the  following guidelines (reference ranges) for " Cholesterol:  Optimal.....................<200 mg/dL  Borderline High.............200-239 mg/dL  High........................> or = 240 mg/dL      Triglycerides 01/26/2022 86  30 - 150 mg/dL Final    Comment: The National Cholesterol Education Program (NCEP) has set the  following guidelines (reference values) for triglycerides:  Normal......................<150 mg/dL  Borderline High.............150-199 mg/dL  High........................200-499 mg/dL      HDL 01/26/2022 43  40 - 75 mg/dL Final    Comment: The National Cholesterol Education Program (NCEP) has set the  following guidelines (reference values) for HDL Cholesterol:  Low...............<40 mg/dL  Optimal...........>60 mg/dL      LDL Cholesterol 01/26/2022 86.8  63.0 - 159.0 mg/dL Final    Comment: The National Cholesterol Education Program (NCEP) has set the  following guidelines (reference values) for LDL Cholesterol:  Optimal.......................<130 mg/dL  Borderline High...............130-159 mg/dL  High..........................160-189 mg/dL  Very High.....................>190 mg/dL      HDL/Cholesterol Ratio 01/26/2022 29.3  20.0 - 50.0 % Final    Total Cholesterol/HDL Ratio 01/26/2022 3.4  2.0 - 5.0 Final    Non-HDL Cholesterol 01/26/2022 104  mg/dL Final    Comment: Risk category and Non-HDL cholesterol goals:  Coronary heart disease (CHD)or equivalent (10-year risk of CHD >20%):  Non-HDL cholesterol goal     <130 mg/dL  Two or more CHD risk factors and 10-year risk of CHD <= 20%:  Non-HDL cholesterol goal     <160 mg/dL  0 to 1 CHD risk factor:  Non-HDL cholesterol goal     <190 mg/dL      Sodium 01/26/2022 142  136 - 145 mmol/L Final    Potassium 01/26/2022 4.2  3.5 - 5.1 mmol/L Final    Chloride 01/26/2022 108  95 - 110 mmol/L Final    CO2 01/26/2022 22* 23 - 29 mmol/L Final    Glucose 01/26/2022 115* 70 - 110 mg/dL Final    BUN 01/26/2022 11  6 - 20 mg/dL Final    Creatinine 01/26/2022 0.8  0.5 - 1.4 mg/dL Final    Calcium  01/26/2022 9.6  8.7 - 10.5 mg/dL Final    Total Protein 01/26/2022 7.4  6.0 - 8.4 g/dL Final    Albumin 01/26/2022 4.0  3.5 - 5.2 g/dL Final    Total Bilirubin 01/26/2022 0.6  0.1 - 1.0 mg/dL Final    Comment: For infants and newborns, interpretation of results should be based  on gestational age, weight and in agreement with clinical  observations.    Premature Infant recommended reference ranges:  Up to 24 hours.............<8.0 mg/dL  Up to 48 hours............<12.0 mg/dL  3-5 days..................<15.0 mg/dL  6-29 days.................<15.0 mg/dL      Alkaline Phosphatase 01/26/2022 49* 55 - 135 U/L Final    AST 01/26/2022 17  10 - 40 U/L Final    ALT 01/26/2022 16  10 - 44 U/L Final    Anion Gap 01/26/2022 12  8 - 16 mmol/L Final    eGFR if African American 01/26/2022 >60  >60 mL/min/1.73 m^2 Final    eGFR if non African American 01/26/2022 >60  >60 mL/min/1.73 m^2 Final    Comment: Calculation used to obtain the estimated glomerular filtration  rate (eGFR) is the CKD-EPI equation.       Uric Acid 01/26/2022 3.4  2.4 - 5.7 mg/dL Final    LH 01/26/2022 18.9  See Text mIU/mL Final    Comment: Female Reference Ranges:  Follicular phase.............1.8-11.8 mIU/mL  Midcycle phase...............7.6-89.1 mIU/mL  Luteal phase.................0.6-14.0 mIU/mL  Post-menopausal without HRT..5.2-62.0 mIU/mL  Male Reference Interval......0.6-12.1 mIU/mL      Follicle Stimulating Hormone 01/26/2022 36.33  See Text mIU/mL Final    Comment: Female Reference Ranges:  Follicular Phase.................3.03-8.08 mIU/mL  Midcycle Peak....................2.55-16.69 mIU/mL  Luteal Phase.....................1.38-5.47 mIU/mL  Postmenopausal...................26..41 mIU/mL  Male Reference Range:............0.95-11.95 mIU/mL         ASSESSMENT and PLAN:    1. Type 2 diabetes mellitus with hyperglycemia, with long-term current use of insulin  TSH    Hemoglobin A1C    empagliflozin (JARDIANCE) 25 mg tablet   2. Type 2  diabetes mellitus with diabetic polyneuropathy, with long-term current use of insulin     3. Essential hypertension     4. Hyperlipidemia, unspecified hyperlipidemia type     5. Obesity (BMI 30-39.9)     6. Postmenopausal  DXA Bone Density Spine And Hip   7. Goiter     8. Type 2 diabetes mellitus without complication, with long-term current use of insulin        T2DM with hyperglycemia, neuropathy-A1c is at goal. Continue current regimen. Pt is not having hypoglycemia. Discussed A1c and BG goals.   Reviewed  hypoglycemia, s/s and appropriate tx.   Instructed to monitor BG and bring meter/ log to every clinic visit.   - takes ASA, ACEi, statin  Neuropathy- continue gabapentin 300 mg nightly.  HTN -stable, continue meds as previously prescribed and monitor. Check bp daily and send log in one week.    HLP -stable LDL , continue crestor to 20 mg daily, LFTs WNL.   Obesity-There is no height or weight on file to calculate BMI. Continue exercise and diet.  Hypovitaminosis d-low last time-continue ergocalciferol to 3x weekly.  Postmenopausal-dexa scan.  Vitamin B 12 deficiency-check vb12.  Nodular thyroid disease- S/p left thyroidectomy. TSH is elevated. Will repeat in a few weeks and start Levothyroxine 50 mcg daily if still elevated.     TSH in 2 weeks  Dexa scan  Follow-up: in 3 months with lab prior

## 2022-02-25 ENCOUNTER — HOSPITAL ENCOUNTER (OUTPATIENT)
Dept: RADIOLOGY | Facility: CLINIC | Age: 52
Discharge: HOME | End: 2022-02-25
Attending: PHYSICIAN ASSISTANT
Payer: OTHER GOVERNMENT

## 2022-02-25 DIAGNOSIS — Z78.0 POSTMENOPAUSAL: ICD-10-CM

## 2022-02-25 PROCEDURE — 77080 DXA BONE DENSITY AXIAL: CPT | Mod: 26,,, | Performed by: RADIOLOGY

## 2022-02-25 PROCEDURE — 77080 DXA BONE DENSITY AXIAL: CPT | Mod: TC,PO

## 2022-02-25 PROCEDURE — 77080 DEXA BONE DENSITY SPINE HIP: ICD-10-PCS | Mod: 26,,, | Performed by: RADIOLOGY

## 2022-02-27 DIAGNOSIS — E03.9 HYPOTHYROIDISM, UNSPECIFIED TYPE: Primary | ICD-10-CM

## 2022-02-27 RX ORDER — LEVOTHYROXINE SODIUM 50 UG/1
50 TABLET ORAL
Qty: 30 TABLET | Refills: 11 | Status: SHIPPED | OUTPATIENT
Start: 2022-02-27 | End: 2022-03-15 | Stop reason: SDUPTHER

## 2022-03-14 ENCOUNTER — TELEPHONE (OUTPATIENT)
Dept: ENDOCRINOLOGY | Facility: CLINIC | Age: 52
End: 2022-03-14
Payer: OTHER GOVERNMENT

## 2022-03-14 NOTE — TELEPHONE ENCOUNTER
----- Message from DIXIE Lomeli PA-C sent at 3/13/2022  8:29 PM CDT -----  Your DEXA scan shows your bone density is normal for your age. We will repeat this test in 2 years.

## 2022-03-14 NOTE — TELEPHONE ENCOUNTER
"Attempted to notify patient of her Bone Scan results from Lilia Lomeli PA-C below but no ans. Lvm:  "Your DEXA scan shows your bone density is normal for your age. We will repeat this test in 2 years."     "

## 2022-03-15 DIAGNOSIS — E03.9 HYPOTHYROIDISM, UNSPECIFIED TYPE: ICD-10-CM

## 2022-03-15 RX ORDER — LEVOTHYROXINE SODIUM 50 UG/1
50 TABLET ORAL
Qty: 30 TABLET | Refills: 11 | Status: SHIPPED | OUTPATIENT
Start: 2022-03-15 | End: 2022-05-19

## 2022-03-16 ENCOUNTER — TELEPHONE (OUTPATIENT)
Dept: ENDOCRINOLOGY | Facility: CLINIC | Age: 52
End: 2022-03-16
Payer: OTHER GOVERNMENT

## 2022-03-16 NOTE — TELEPHONE ENCOUNTER
----- Message from Sabino May sent at 3/16/2022 11:00 AM CDT -----  Contact: pt at [phone]  Type:  Patient Returning Call  Who Called:  pt  Who Left Message for Patient:  Jessica  Does the patient know what this is regarding?:  yes  Best Call Back Number:  [phone]  Additional Information:  pt is calling the office back to return a call back to Jessica. Please call back and advise.

## 2022-03-16 NOTE — TELEPHONE ENCOUNTER
----- Message from Alan Michael sent at 3/16/2022  9:36 AM CDT -----  Type: Needs Medical Advice  Who Called: Patient    Best Call Back Number: [phone]  Additional Information: Patient states that she would like a callback regarding her Dexa scan results.

## 2022-05-10 ENCOUNTER — LAB VISIT (OUTPATIENT)
Dept: LAB | Facility: HOSPITAL | Age: 52
End: 2022-05-10
Attending: PHYSICIAN ASSISTANT
Payer: OTHER GOVERNMENT

## 2022-05-10 DIAGNOSIS — E11.65 TYPE 2 DIABETES MELLITUS WITH HYPERGLYCEMIA, WITH LONG-TERM CURRENT USE OF INSULIN: ICD-10-CM

## 2022-05-10 DIAGNOSIS — Z79.4 TYPE 2 DIABETES MELLITUS WITH HYPERGLYCEMIA, WITH LONG-TERM CURRENT USE OF INSULIN: ICD-10-CM

## 2022-05-10 LAB
ESTIMATED AVG GLUCOSE: 126 MG/DL (ref 68–131)
HBA1C MFR BLD: 6 % (ref 4–5.6)

## 2022-05-10 PROCEDURE — 83036 HEMOGLOBIN GLYCOSYLATED A1C: CPT | Performed by: PHYSICIAN ASSISTANT

## 2022-05-10 PROCEDURE — 36415 COLL VENOUS BLD VENIPUNCTURE: CPT | Mod: PO | Performed by: PHYSICIAN ASSISTANT

## 2022-05-17 ENCOUNTER — OFFICE VISIT (OUTPATIENT)
Dept: ENDOCRINOLOGY | Facility: CLINIC | Age: 52
End: 2022-05-17
Payer: OTHER GOVERNMENT

## 2022-05-17 ENCOUNTER — LAB VISIT (OUTPATIENT)
Dept: LAB | Facility: HOSPITAL | Age: 52
End: 2022-05-17
Attending: PHYSICIAN ASSISTANT
Payer: OTHER GOVERNMENT

## 2022-05-17 VITALS
DIASTOLIC BLOOD PRESSURE: 86 MMHG | HEIGHT: 68 IN | OXYGEN SATURATION: 96 % | TEMPERATURE: 98 F | SYSTOLIC BLOOD PRESSURE: 130 MMHG | HEART RATE: 93 BPM | BODY MASS INDEX: 42.94 KG/M2 | WEIGHT: 283.31 LBS

## 2022-05-17 DIAGNOSIS — E78.5 HYPERLIPIDEMIA, UNSPECIFIED HYPERLIPIDEMIA TYPE: ICD-10-CM

## 2022-05-17 DIAGNOSIS — E04.9 GOITER: ICD-10-CM

## 2022-05-17 DIAGNOSIS — Z79.4 TYPE 2 DIABETES MELLITUS WITH DIABETIC POLYNEUROPATHY, WITH LONG-TERM CURRENT USE OF INSULIN: ICD-10-CM

## 2022-05-17 DIAGNOSIS — E66.9 OBESITY (BMI 30-39.9): ICD-10-CM

## 2022-05-17 DIAGNOSIS — E11.42 TYPE 2 DIABETES MELLITUS WITH DIABETIC POLYNEUROPATHY, WITH LONG-TERM CURRENT USE OF INSULIN: ICD-10-CM

## 2022-05-17 DIAGNOSIS — Z79.4 TYPE 2 DIABETES MELLITUS WITH HYPERGLYCEMIA, WITH LONG-TERM CURRENT USE OF INSULIN: ICD-10-CM

## 2022-05-17 DIAGNOSIS — I10 ESSENTIAL HYPERTENSION: ICD-10-CM

## 2022-05-17 DIAGNOSIS — E53.8 VITAMIN B12 DEFICIENCY: ICD-10-CM

## 2022-05-17 DIAGNOSIS — E11.9 TYPE 2 DIABETES MELLITUS WITHOUT COMPLICATION, WITH LONG-TERM CURRENT USE OF INSULIN: ICD-10-CM

## 2022-05-17 DIAGNOSIS — E11.65 TYPE 2 DIABETES MELLITUS WITH HYPERGLYCEMIA, WITH LONG-TERM CURRENT USE OF INSULIN: Primary | ICD-10-CM

## 2022-05-17 DIAGNOSIS — E11.65 TYPE 2 DIABETES MELLITUS WITH HYPERGLYCEMIA, WITH LONG-TERM CURRENT USE OF INSULIN: ICD-10-CM

## 2022-05-17 DIAGNOSIS — E03.9 HYPOTHYROIDISM, UNSPECIFIED TYPE: ICD-10-CM

## 2022-05-17 DIAGNOSIS — E61.1 IRON DEFICIENCY: ICD-10-CM

## 2022-05-17 DIAGNOSIS — Z79.4 TYPE 2 DIABETES MELLITUS WITHOUT COMPLICATION, WITH LONG-TERM CURRENT USE OF INSULIN: ICD-10-CM

## 2022-05-17 DIAGNOSIS — E55.9 HYPOVITAMINOSIS D: ICD-10-CM

## 2022-05-17 DIAGNOSIS — Z79.4 TYPE 2 DIABETES MELLITUS WITH HYPERGLYCEMIA, WITH LONG-TERM CURRENT USE OF INSULIN: Primary | ICD-10-CM

## 2022-05-17 LAB
ALBUMIN SERPL BCP-MCNC: 4 G/DL (ref 3.5–5.2)
ANION GAP SERPL CALC-SCNC: 8 MMOL/L (ref 8–16)
BASOPHILS # BLD AUTO: 0.04 K/UL (ref 0–0.2)
BASOPHILS NFR BLD: 0.8 % (ref 0–1.9)
BUN SERPL-MCNC: 11 MG/DL (ref 6–20)
CALCIUM SERPL-MCNC: 9.4 MG/DL (ref 8.7–10.5)
CHLORIDE SERPL-SCNC: 107 MMOL/L (ref 95–110)
CO2 SERPL-SCNC: 22 MMOL/L (ref 23–29)
CREAT SERPL-MCNC: 0.8 MG/DL (ref 0.5–1.4)
DIFFERENTIAL METHOD: ABNORMAL
EOSINOPHIL # BLD AUTO: 0.2 K/UL (ref 0–0.5)
EOSINOPHIL NFR BLD: 3.9 % (ref 0–8)
ERYTHROCYTE [DISTWIDTH] IN BLOOD BY AUTOMATED COUNT: 14.6 % (ref 11.5–14.5)
EST. GFR  (AFRICAN AMERICAN): >60 ML/MIN/1.73 M^2
EST. GFR  (NON AFRICAN AMERICAN): >60 ML/MIN/1.73 M^2
GLUCOSE SERPL-MCNC: 127 MG/DL (ref 70–110)
GLUCOSE SERPL-MCNC: 93 MG/DL (ref 70–110)
HCT VFR BLD AUTO: 42.6 % (ref 37–48.5)
HGB BLD-MCNC: 12.8 G/DL (ref 12–16)
IMM GRANULOCYTES # BLD AUTO: 0.01 K/UL (ref 0–0.04)
IMM GRANULOCYTES NFR BLD AUTO: 0.2 % (ref 0–0.5)
IRON SERPL-MCNC: 59 UG/DL (ref 30–160)
LYMPHOCYTES # BLD AUTO: 2.3 K/UL (ref 1–4.8)
LYMPHOCYTES NFR BLD: 45.5 % (ref 18–48)
MCH RBC QN AUTO: 25.8 PG (ref 27–31)
MCHC RBC AUTO-ENTMCNC: 30 G/DL (ref 32–36)
MCV RBC AUTO: 86 FL (ref 82–98)
MONOCYTES # BLD AUTO: 0.4 K/UL (ref 0.3–1)
MONOCYTES NFR BLD: 8.2 % (ref 4–15)
NEUTROPHILS # BLD AUTO: 2.1 K/UL (ref 1.8–7.7)
NEUTROPHILS NFR BLD: 41.4 % (ref 38–73)
NRBC BLD-RTO: 0 /100 WBC
PHOSPHATE SERPL-MCNC: 3.3 MG/DL (ref 2.7–4.5)
PLATELET # BLD AUTO: 283 K/UL (ref 150–450)
PMV BLD AUTO: 10.2 FL (ref 9.2–12.9)
POTASSIUM SERPL-SCNC: 3.7 MMOL/L (ref 3.5–5.1)
RBC # BLD AUTO: 4.96 M/UL (ref 4–5.4)
SATURATED IRON: 14 % (ref 20–50)
SODIUM SERPL-SCNC: 137 MMOL/L (ref 136–145)
TOTAL IRON BINDING CAPACITY: 411 UG/DL (ref 250–450)
TRANSFERRIN SERPL-MCNC: 278 MG/DL (ref 200–375)
TSH SERPL DL<=0.005 MIU/L-ACNC: 3.94 UIU/ML (ref 0.4–4)
WBC # BLD AUTO: 5.14 K/UL (ref 3.9–12.7)

## 2022-05-17 PROCEDURE — 80069 RENAL FUNCTION PANEL: CPT | Performed by: PHYSICIAN ASSISTANT

## 2022-05-17 PROCEDURE — 99999 PR PBB SHADOW E&M-EST. PATIENT-LVL IV: CPT | Mod: PBBFAC,,, | Performed by: PHYSICIAN ASSISTANT

## 2022-05-17 PROCEDURE — 99214 OFFICE O/P EST MOD 30 MIN: CPT | Mod: S$PBB,,, | Performed by: PHYSICIAN ASSISTANT

## 2022-05-17 PROCEDURE — 85025 COMPLETE CBC W/AUTO DIFF WBC: CPT | Performed by: PHYSICIAN ASSISTANT

## 2022-05-17 PROCEDURE — 84443 ASSAY THYROID STIM HORMONE: CPT | Performed by: PHYSICIAN ASSISTANT

## 2022-05-17 PROCEDURE — 84466 ASSAY OF TRANSFERRIN: CPT | Performed by: PHYSICIAN ASSISTANT

## 2022-05-17 PROCEDURE — 99214 OFFICE O/P EST MOD 30 MIN: CPT | Mod: PBBFAC,PO | Performed by: PHYSICIAN ASSISTANT

## 2022-05-17 PROCEDURE — 99214 PR OFFICE/OUTPT VISIT, EST, LEVL IV, 30-39 MIN: ICD-10-PCS | Mod: S$PBB,,, | Performed by: PHYSICIAN ASSISTANT

## 2022-05-17 PROCEDURE — 99999 PR PBB SHADOW E&M-EST. PATIENT-LVL IV: ICD-10-PCS | Mod: PBBFAC,,, | Performed by: PHYSICIAN ASSISTANT

## 2022-05-17 PROCEDURE — 36415 COLL VENOUS BLD VENIPUNCTURE: CPT | Mod: PO | Performed by: PHYSICIAN ASSISTANT

## 2022-05-17 PROCEDURE — 82962 GLUCOSE BLOOD TEST: CPT | Mod: PBBFAC,PO | Performed by: PHYSICIAN ASSISTANT

## 2022-05-17 RX ORDER — PIOGLITAZONEHYDROCHLORIDE 30 MG/1
30 TABLET ORAL DAILY
Qty: 90 TABLET | Refills: 3 | Status: SHIPPED | OUTPATIENT
Start: 2022-05-17 | End: 2022-11-18

## 2022-05-17 RX ORDER — METFORMIN HYDROCHLORIDE 1000 MG/1
1000 TABLET ORAL 2 TIMES DAILY WITH MEALS
Qty: 180 TABLET | Refills: 3 | Status: SHIPPED | OUTPATIENT
Start: 2022-05-17 | End: 2024-03-04 | Stop reason: SDUPTHER

## 2022-05-17 RX ORDER — INSULIN PUMP SYRINGE, 3 ML
EACH MISCELLANEOUS
Qty: 1 EACH | Refills: 0 | Status: SHIPPED | OUTPATIENT
Start: 2022-05-17 | End: 2024-03-22 | Stop reason: SDUPTHER

## 2022-05-17 RX ORDER — SEMAGLUTIDE 2.68 MG/ML
2 INJECTION, SOLUTION SUBCUTANEOUS
Qty: 9 ML | Refills: 3 | Status: SHIPPED | OUTPATIENT
Start: 2022-05-17 | End: 2022-06-06

## 2022-05-17 NOTE — PROGRESS NOTES
CC: This 52 y.o. female presents for management of Diabetes Mellitus  and chronic conditions pending review including HTN, HLP    HPI: was diagnosed with T2DM in >10 years on lab work.   Has never been hospitalized r/t DM.  Family hx of DM: parents, aunts, uncles  Fhx of thyroid disease: gf had thyroid cancer, aunts,   Denies missing doses of DM medication.   hypoglycemia at home: none  monitoring BG at home:  Fastin-140s  DN: 150a    Diet:   BF- eggs, turkey mackay, protein shake  LH-skips  DN-fish and veg  Snacks  Avoids sugary beverages.     Exercise: None    CURRENT DM MEDS: Actos 30 mg qd, Ozempic 1 mg weekly, metformin 1000 bid, Jardiance 25 mg daily,      Standards of Care:  Eye exam: Not in a while due to insurance  Podiatry exam: Follows w/ Dr. Akira LIRA: last year    DEXA scan:  wnl. FSH is elevated. LH is normal. She has been taking ergocalciferol 2x weekly for longer than one year.     MNG  S/p left thyroidectomy -benign  Thyroid u/s  shows a 5.8 cm nodule in the left thyroid and a 2.2 cm mass in the right thyroid. + occ sob and hoarseness. No dysphagia. FNAs were benign of both nodules .     Hypothyroidism  LT4 50 mcg qd  + fatigue, constipation, cold/heat intolerance, anxiety  No diarrhea or tremors.    PMHx, PSHx: reviewed in epic.  Social Hx: no E/T use.    Wt Readings from Last 6 Encounters:   22 128.5 kg (283 lb 4.7 oz)   22 126.3 kg (278 lb 7.1 oz)   21 126 kg (277 lb 12.5 oz)   21 (!) 141 kg (310 lb 12.8 oz)   10/19/21 128.6 kg (283 lb 8.2 oz)   21 132.6 kg (292 lb 3.5 oz)      ROS:   Gen: Appetite good, + wt stable, denies fatigue and weakness.  Skin: Skin is intact and heals well, no rashes, no hair changes  Eyes: Denies visual disturbances  Resp: no SOB or AGUILAR, no cough  Cardiac: No palpitations, chest pain, no edema   GI:  No nausea or vomiting, diarrhea, constipation, or abdominal pain.  /GYN:+ night sweats, No nocturia, burning or pain.  "  MS/Neuro: + knee pain, Denies numbness/ tingling in BLE; Gait steady, speech clear  Psych: Denies drug/ETOH abuse, no hx of depression.  Other systems: negative.    /86 (BP Location: Left arm, Patient Position: Sitting, BP Method: Large (Manual))   Pulse 93   Temp 98.1 °F (36.7 °C) (Oral)   Ht 5' 8" (1.727 m)   Wt 128.5 kg (283 lb 4.7 oz)   SpO2 96%   BMI 43.07 kg/m²      PE:  GENERAL: middle aged female,well developed, well nourished.  PSYCH: AAOx3, appropriate mood and affect, pleasant expression, conversant, appears relaxed, well groomed.   EYES: Conjunctiva, corneas clear  NECK: Supple, trachea midline,+ thyromegaly  CHEST: Resp even and unlabored, CTA bilateral.  CARDIAC: RRR, S1, S2 heard, no murmurs  VASCULAR: DP pulses +2/4 bilaterally, no edema.  NEURO: Gait steady, CN ll-Xll grossly intact  SKIN: Skin warm and dry no acanthosis nigracans.  9/21  Foot Exam: no sores or macerations noted.     Protective Sensation (w/ 10 gram monofilament):  Right: Intact  Left: Intact    Visual Inspection:  Normal -  Bilateral and Nails Intact - without Evidence of Foot Deformity- Bilateral    Pedal Pulses:   Right: Present  Left: Present    Posterior tibialis:   Right:Present  Left: Present     Vibratory Sensation  Right:Positive  Left:Decreased    Personally reviewed labs below:    Lab Visit on 05/10/2022   Component Date Value Ref Range Status    Hemoglobin A1C 05/10/2022 6.0 (A) 4.0 - 5.6 % Final    Comment: ADA Screening Guidelines:  5.7-6.4%  Consistent with prediabetes  >or=6.5%  Consistent with diabetes    High levels of fetal hemoglobin interfere with the HbA1C  assay. Heterozygous hemoglobin variants (HbS, HgC, etc)do  not significantly interfere with this assay.   However, presence of multiple variants may affect accuracy.      Estimated Avg Glucose 05/10/2022 126  68 - 131 mg/dL Final       ASSESSMENT and PLAN:    1. Type 2 diabetes mellitus with hyperglycemia, with long-term current use of " insulin  TSH    Hemoglobin A1C    Renal Function Panel    Microalbumin/Creatinine Ratio, Urine    POCT Glucose, Hand-Held Device    CBC Auto Differential    semaglutide (OZEMPIC) 2 mg/dose (8 mg/3 mL) PnIj    pioglitazone (ACTOS) 30 MG tablet    metFORMIN (GLUCOPHAGE) 1000 MG tablet   2. Type 2 diabetes mellitus with diabetic polyneuropathy, with long-term current use of insulin     3. Essential hypertension     4. Hyperlipidemia, unspecified hyperlipidemia type     5. Obesity (BMI 30-39.9)     6. Hypothyroidism, unspecified type     7. Vitamin B12 deficiency     8. Goiter  US Soft Tissue Head Neck Thyroid   9. Hypovitaminosis D  Vitamin D   10. Iron deficiency  Iron and TIBC    CBC Auto Differential   11. Type 2 diabetes mellitus without complication, with long-term current use of insulin  pioglitazone (ACTOS) 30 MG tablet      T2DM with hyperglycemia, neuropathy-A1c is at goal. Readings are higher than goal. Glucose was 127 in clinic. Pt is not having hypoglycemia. Increase Ozempic to 2 mg weekly. Continue Actos, Jardiance and Metformin.  Discussed A1c and BG goals.   Reviewed  hypoglycemia, s/s and appropriate tx.   Instructed to monitor BG and bring meter/ log to every clinic visit.   - takes ASA, ACEi, statin  Neuropathy- continue gabapentin 300 mg nightly.  HTN -stable, continue meds as previously prescribed and monitor. Check bp daily and send log in one week.    HLP -stable LDL , continue crestor to 20 mg daily, LFTs WNL.   Obesity-Body mass index is 43.07 kg/m². Continue exercise and diet.  Hypovitaminosis d-low last time-continue ergocalciferol to 3x weekly. Ozempic will help with wt loss.   Postmenopausal-dexa scan 2/24.  Vitamin B 12 deficiency-check vb12.  Nodular thyroid disease- S/p left thyroidectomy. Repeat thyroid u/s next time. TSH today. Continue LT4 dose.   Iron deficiency-check iron.    TSH, mac, iron, cbc today  Follow-up: in 3 months with lab prior and thyroid u/s

## 2022-05-19 DIAGNOSIS — E03.9 HYPOTHYROIDISM, UNSPECIFIED TYPE: Primary | ICD-10-CM

## 2022-05-19 RX ORDER — LEVOTHYROXINE SODIUM 75 UG/1
75 TABLET ORAL
Qty: 30 TABLET | Refills: 11 | Status: SHIPPED | OUTPATIENT
Start: 2022-05-19 | End: 2022-08-24

## 2022-05-25 ENCOUNTER — TELEPHONE (OUTPATIENT)
Dept: ENDOCRINOLOGY | Facility: CLINIC | Age: 52
End: 2022-05-25
Payer: OTHER GOVERNMENT

## 2022-05-25 NOTE — TELEPHONE ENCOUNTER
----- Message from Alan Michael sent at 5/25/2022  2:54 PM CDT -----  Type:  Patient Returning Call    Who Called:  Patient  Who Left Message for Patient: NA   Does the patient know what this is regarding?:  Labs  Best Call Back Number:  [phone]  Additional Information:

## 2022-06-06 ENCOUNTER — TELEPHONE (OUTPATIENT)
Dept: ENDOCRINOLOGY | Facility: CLINIC | Age: 52
End: 2022-06-06
Payer: OTHER GOVERNMENT

## 2022-06-06 DIAGNOSIS — E11.65 TYPE 2 DIABETES MELLITUS WITH HYPERGLYCEMIA, WITH LONG-TERM CURRENT USE OF INSULIN: Primary | ICD-10-CM

## 2022-06-06 DIAGNOSIS — Z79.4 TYPE 2 DIABETES MELLITUS WITH HYPERGLYCEMIA, WITH LONG-TERM CURRENT USE OF INSULIN: Primary | ICD-10-CM

## 2022-06-06 RX ORDER — SEMAGLUTIDE 1.34 MG/ML
INJECTION, SOLUTION SUBCUTANEOUS
Qty: 6 PEN | Refills: 3 | Status: SHIPPED | OUTPATIENT
Start: 2022-06-06 | End: 2022-11-18

## 2022-06-07 NOTE — TELEPHONE ENCOUNTER
----- Message from Elda Littlejohn MA sent at 6/6/2022  2:01 PM CDT -----  Contact: PT  Spoke with patient, she stated she will stay with [facility] , its covered , and she will do the two shots.  ----- Message -----  From: Jodie Brown  Sent: 6/6/2022  12:37 PM CDT  To: [facility] Staff    Type: Needs Medical Advice    Who Called: PT  Best Call Back Number: [phone]  Additional  Information: Pt would like a call back to know when RX Ozempic, has been sent to pharm  Please Advise- Thank you

## 2022-08-03 ENCOUNTER — OFFICE VISIT (OUTPATIENT)
Dept: FAMILY MEDICINE | Facility: CLINIC | Age: 52
End: 2022-08-03
Payer: OTHER GOVERNMENT

## 2022-08-03 VITALS
BODY MASS INDEX: 41.93 KG/M2 | SYSTOLIC BLOOD PRESSURE: 124 MMHG | DIASTOLIC BLOOD PRESSURE: 80 MMHG | WEIGHT: 276.69 LBS | TEMPERATURE: 98 F | OXYGEN SATURATION: 97 % | HEART RATE: 85 BPM | HEIGHT: 68 IN

## 2022-08-03 DIAGNOSIS — M79.7 FIBROMYALGIA: ICD-10-CM

## 2022-08-03 DIAGNOSIS — E78.5 HYPERLIPIDEMIA, UNSPECIFIED HYPERLIPIDEMIA TYPE: ICD-10-CM

## 2022-08-03 DIAGNOSIS — E66.01 MORBID OBESITY: ICD-10-CM

## 2022-08-03 DIAGNOSIS — M79.605 BILATERAL LEG AND FOOT PAIN: ICD-10-CM

## 2022-08-03 DIAGNOSIS — K21.9 GASTROESOPHAGEAL REFLUX DISEASE WITHOUT ESOPHAGITIS: ICD-10-CM

## 2022-08-03 DIAGNOSIS — E11.9 TYPE 2 DIABETES MELLITUS WITHOUT COMPLICATION, WITHOUT LONG-TERM CURRENT USE OF INSULIN: ICD-10-CM

## 2022-08-03 DIAGNOSIS — R00.2 PALPITATIONS: ICD-10-CM

## 2022-08-03 DIAGNOSIS — M79.672 BILATERAL LEG AND FOOT PAIN: ICD-10-CM

## 2022-08-03 DIAGNOSIS — M79.671 BILATERAL LEG AND FOOT PAIN: ICD-10-CM

## 2022-08-03 DIAGNOSIS — F41.1 GAD (GENERALIZED ANXIETY DISORDER): ICD-10-CM

## 2022-08-03 DIAGNOSIS — I10 ESSENTIAL HYPERTENSION: Primary | ICD-10-CM

## 2022-08-03 DIAGNOSIS — G44.229 CHRONIC TENSION-TYPE HEADACHE, NOT INTRACTABLE: ICD-10-CM

## 2022-08-03 DIAGNOSIS — M79.604 BILATERAL LEG AND FOOT PAIN: ICD-10-CM

## 2022-08-03 PROBLEM — J96.01 ACUTE HYPOXEMIC RESPIRATORY FAILURE: Status: RESOLVED | Noted: 2020-03-24 | Resolved: 2022-08-03

## 2022-08-03 PROCEDURE — 99999 PR PBB SHADOW E&M-EST. PATIENT-LVL IV: CPT | Mod: PBBFAC,,, | Performed by: FAMILY MEDICINE

## 2022-08-03 PROCEDURE — 99204 OFFICE O/P NEW MOD 45 MIN: CPT | Mod: S$PBB,,, | Performed by: FAMILY MEDICINE

## 2022-08-03 PROCEDURE — 93005 ELECTROCARDIOGRAM TRACING: CPT | Mod: PBBFAC,PN | Performed by: INTERNAL MEDICINE

## 2022-08-03 PROCEDURE — 99214 OFFICE O/P EST MOD 30 MIN: CPT | Mod: PBBFAC,PN | Performed by: FAMILY MEDICINE

## 2022-08-03 PROCEDURE — 99999 PR PBB SHADOW E&M-EST. PATIENT-LVL IV: ICD-10-PCS | Mod: PBBFAC,,, | Performed by: FAMILY MEDICINE

## 2022-08-03 PROCEDURE — 93010 ELECTROCARDIOGRAM REPORT: CPT | Mod: S$PBB,,, | Performed by: INTERNAL MEDICINE

## 2022-08-03 PROCEDURE — 99204 PR OFFICE/OUTPT VISIT, NEW, LEVL IV, 45-59 MIN: ICD-10-PCS | Mod: S$PBB,,, | Performed by: FAMILY MEDICINE

## 2022-08-03 PROCEDURE — 93010 EKG 12-LEAD: ICD-10-PCS | Mod: S$PBB,,, | Performed by: INTERNAL MEDICINE

## 2022-08-03 RX ORDER — CLONAZEPAM 1 MG/1
1 TABLET ORAL 2 TIMES DAILY PRN
COMMUNITY

## 2022-08-03 RX ORDER — CYCLOBENZAPRINE HCL 10 MG
10 TABLET ORAL 3 TIMES DAILY PRN
Qty: 90 TABLET | Refills: 1 | Status: SHIPPED | OUTPATIENT
Start: 2022-08-03 | End: 2022-08-13

## 2022-08-03 RX ORDER — DULOXETIN HYDROCHLORIDE 60 MG/1
60 CAPSULE, DELAYED RELEASE ORAL DAILY
Qty: 30 CAPSULE | Refills: 6 | Status: SHIPPED | OUTPATIENT
Start: 2022-08-03 | End: 2023-09-21 | Stop reason: SDUPTHER

## 2022-08-03 RX ORDER — CYANOCOBALAMIN, ISOPROPYL ALCOHOL 1000MCG/ML
1000 KIT INJECTION WEEKLY
COMMUNITY

## 2022-08-03 RX ORDER — DULOXETIN HYDROCHLORIDE 30 MG/1
30 CAPSULE, DELAYED RELEASE ORAL DAILY
COMMUNITY
Start: 2022-06-22 | End: 2022-08-03 | Stop reason: SDUPTHER

## 2022-08-03 RX ORDER — MELOXICAM 15 MG/1
15 TABLET ORAL DAILY
COMMUNITY
End: 2023-07-28

## 2022-08-03 NOTE — PROGRESS NOTES
Subjective:       Patient ID: Patria Bennett is a 52 y.o. female.    Chief Complaint: Establish Care (C/o headaches, legs pain x6-7 months), Hypertension, and Diabetes (Pt here to est. Htn DM hypothryoid)    Patient Active Problem List:     Gastroesophageal reflux disease     Essential hypertension     Type 2 diabetes mellitus without complication     BRANDY (generalized anxiety disorder)     Acute hypoxemic respiratory failure     Morbid obesity     Hyperlipidemia    Current Outpatient Medications:  blood sugar diagnostic (BLOOD GLUCOSE TEST) Strp, Check 2x daily. Insurance preferred.  blood-glucose meter kit, Use as instructed. Insurance preferred.  clonazePAM (KLONOPIN) 1 MG tablet, Take 1 mg by mouth 2 (two) times daily as needed for Anxiety.  cyanocobalamin, vitamin B-12, (B-12 COMPLIANCE) 1,000 mcg/mL Kit, Inject 1,000 mcg as directed once a week.  DULoxetine (CYMBALTA) 30 MG capsule, Take 30 mg by mouth once daily.  empagliflozin (JARDIANCE) 25 mg tablet, Take 1 tablet (25 mg total) by mouth every morning.  ergocalciferol (VITAMIN D2) 50,000 unit Cap, Take one capsule 3x weekly. (Patient taking differently: Take one capsule 2x weekly.)  gabapentin (NEURONTIN) 300 MG capsule, Take one capsule nightly. (Patient taking differently: Take 300 mg by mouth nightly as needed. Take one capsule nightly.)  levothyroxine (SYNTHROID) 75 MCG tablet, Take 1 tablet (75 mcg total) by mouth before breakfast.  meloxicam (MOBIC) 15 MG tablet, Take 15 mg by mouth once daily.  metFORMIN (GLUCOPHAGE) 1000 MG tablet, Take 1 tablet (1,000 mg total) by mouth 2 (two) times daily with meals.  pantoprazole (PROTONIX) 40 MG tablet, Take 40 mg by mouth.  pioglitazone (ACTOS) 30 MG tablet, Take 1 tablet (30 mg total) by mouth once daily.  rosuvastatin (CRESTOR) 20 MG tablet, Take 1 tablet (20 mg total) by mouth once daily. (Patient taking differently: Take 20 mg by mouth every evening.)  semaglutide (OZEMPIC) 1 mg/dose (4 mg/3 mL), Inject 2  mg every 7 days.  valsartan-hydrochlorothiazide (DIOVAN-HCT) 160-25 mg per tablet, Take 160 tablets by mouth once daily.  omeprazole (PRILOSEC) 20 MG capsule, Take 20 mg by mouth once daily.  ondansetron (ZOFRAN-ODT) 4 MG TbDL, Take 1 tablet (4 mg total) by mouth every 6 (six) hours as needed (Nausea). (Patient not taking: No sig reported)    No current facility-administered medications for this visit.    Ms. Bennett presents today to discuss some ongoing issues she has been having in addition to establishing care for the issues above.    She has been told by an NP at Urgent care that she may have fibromyalgia.     She has headaches: Onset 6-7 months ago. All over. They can last all day or just 10 minutes.They are more frequent. She denies alleviating factors and states they just spontaneously go away.     Also having pain all over her body- specifically in her legs. At rest or with activity. Ongoing > 3 months. Joints ache. Skin sensitive. Lab work up including inflammatory markers is negative.     Widespread Pain Index: 13  Symptom Severity scale score: 12  MEETS CRITERIA for fibromyalgia: Symptoms at a similar level of at least 3 months. Pain cannot be attributed to another etiology. SS scale score of 5 or more AND WPI of 7 or more.      Review of Systems   Constitutional: Negative for activity change, appetite change, fatigue and fever.   Respiratory: Negative for shortness of breath.    Gastrointestinal: Negative for abdominal pain.   Musculoskeletal: Positive for arthralgias and myalgias.   Integumentary:  Negative for rash.         Objective:      Physical Exam  Vitals and nursing note reviewed.   Constitutional:       General: She is not in acute distress.     Appearance: She is not ill-appearing.   Cardiovascular:      Rate and Rhythm: Normal rate and regular rhythm.      Heart sounds: No murmur heard.  Pulmonary:      Effort: Pulmonary effort is normal.      Breath sounds: Normal breath sounds. No wheezing.    Skin:     General: Skin is warm and dry.      Findings: No rash.   Neurological:      Mental Status: She is alert.   Psychiatric:         Mood and Affect: Mood normal.         Behavior: Behavior normal.         Assessment:       1. Essential hypertension    2. Hyperlipidemia, unspecified hyperlipidemia type    3. Type 2 diabetes mellitus without complication, without long-term current use of insulin    4. BRANDY (generalized anxiety disorder)    5. Chronic tension-type headache, not intractable    6. Palpitations    7. Bilateral leg and foot pain    8. Morbid obesity    9. Gastroesophageal reflux disease without esophagitis    10. Fibromyalgia        Plan:       Problem List Items Addressed This Visit        Psychiatric    BRANDY (generalized anxiety disorder)     Stable. She has had some prn klonopin- this has helped her.               Cardiac/Vascular    Essential hypertension - Primary     Stable. Well controlled. Continue current medications.   Hypertension Medications             valsartan-hydrochlorothiazide (DIOVAN-HCT) 160-25 mg per tablet Take 160 tablets by mouth once daily.                   Hyperlipidemia     Stable. Well controlled. Continue current medications.   Lab Results   Component Value Date    CHOL 147 01/26/2022    CHOL 168 09/14/2021    CHOL 235 (H) 06/08/2021     Lab Results   Component Value Date    HDL 43 01/26/2022    HDL 39 (L) 09/14/2021    HDL 46 06/08/2021     Lab Results   Component Value Date    LDLCALC 86.8 01/26/2022    LDLCALC 113.8 09/14/2021    LDLCALC 157.8 06/08/2021     Lab Results   Component Value Date    TRIG 86 01/26/2022    TRIG 76 09/14/2021    TRIG 156 (H) 06/08/2021     Lab Results   Component Value Date    CHOLHDL 29.3 01/26/2022    CHOLHDL 23.2 09/14/2021    CHOLHDL 19.6 (L) 06/08/2021     Hyperlipidemia Medications             rosuvastatin (CRESTOR) 20 MG tablet Take 1 tablet (20 mg total) by mouth once daily.                      Endocrine    Type 2 diabetes mellitus  without complication     Stable. Well controlled. Continue current medications.   Lab Results   Component Value Date    HGBA1C 6.0 (H) 05/10/2022     Diabetes Medications             empagliflozin (JARDIANCE) 25 mg tablet Take 1 tablet (25 mg total) by mouth every morning.    metFORMIN (GLUCOPHAGE) 1000 MG tablet Take 1 tablet (1,000 mg total) by mouth 2 (two) times daily with meals.    pioglitazone (ACTOS) 30 MG tablet Take 1 tablet (30 mg total) by mouth once daily.    semaglutide (OZEMPIC) 1 mg/dose (4 mg/3 mL) Inject 2 mg every 7 days.                   Morbid obesity     Wt Readings from Last 6 Encounters:   08/03/22 125.5 kg (276 lb 10.8 oz)   05/17/22 128.5 kg (283 lb 4.7 oz)   02/09/22 126.3 kg (278 lb 7.1 oz)   12/22/21 126 kg (277 lb 12.5 oz)   12/02/21 (!) 141 kg (310 lb 12.8 oz)   10/19/21 128.6 kg (283 lb 8.2 oz)     Weight down 7 lb since May 2022.              GI    Gastroesophageal reflux disease     Stable. Well controlled. Continue current medications.                Other Visit Diagnoses     Chronic tension-type headache, not intractable        Palpitations        Relevant Orders    EKG 12-lead    Bilateral leg and foot pain        Fibromyalgia        Relevant Medications    DULoxetine (CYMBALTA) 60 MG capsule            Orders as above.     EKG reviewed by be and is normal. Will refer to cardiology with risk factors and family history,.

## 2022-08-03 NOTE — ASSESSMENT & PLAN NOTE
Stable. Well controlled. Continue current medications.   Lab Results   Component Value Date    HGBA1C 6.0 (H) 05/10/2022     Diabetes Medications             empagliflozin (JARDIANCE) 25 mg tablet Take 1 tablet (25 mg total) by mouth every morning.    metFORMIN (GLUCOPHAGE) 1000 MG tablet Take 1 tablet (1,000 mg total) by mouth 2 (two) times daily with meals.    pioglitazone (ACTOS) 30 MG tablet Take 1 tablet (30 mg total) by mouth once daily.    semaglutide (OZEMPIC) 1 mg/dose (4 mg/3 mL) Inject 2 mg every 7 days.

## 2022-08-03 NOTE — ASSESSMENT & PLAN NOTE
Stable. Well controlled. Continue current medications.   Hypertension Medications             valsartan-hydrochlorothiazide (DIOVAN-HCT) 160-25 mg per tablet Take 160 tablets by mouth once daily.

## 2022-08-03 NOTE — ASSESSMENT & PLAN NOTE
Stable. Well controlled. Continue current medications.   Lab Results   Component Value Date    CHOL 147 01/26/2022    CHOL 168 09/14/2021    CHOL 235 (H) 06/08/2021     Lab Results   Component Value Date    HDL 43 01/26/2022    HDL 39 (L) 09/14/2021    HDL 46 06/08/2021     Lab Results   Component Value Date    LDLCALC 86.8 01/26/2022    LDLCALC 113.8 09/14/2021    LDLCALC 157.8 06/08/2021     Lab Results   Component Value Date    TRIG 86 01/26/2022    TRIG 76 09/14/2021    TRIG 156 (H) 06/08/2021     Lab Results   Component Value Date    CHOLHDL 29.3 01/26/2022    CHOLHDL 23.2 09/14/2021    CHOLHDL 19.6 (L) 06/08/2021     Hyperlipidemia Medications             rosuvastatin (CRESTOR) 20 MG tablet Take 1 tablet (20 mg total) by mouth once daily.

## 2022-08-03 NOTE — ASSESSMENT & PLAN NOTE
Wt Readings from Last 6 Encounters:   08/03/22 125.5 kg (276 lb 10.8 oz)   05/17/22 128.5 kg (283 lb 4.7 oz)   02/09/22 126.3 kg (278 lb 7.1 oz)   12/22/21 126 kg (277 lb 12.5 oz)   12/02/21 (!) 141 kg (310 lb 12.8 oz)   10/19/21 128.6 kg (283 lb 8.2 oz)     Weight down 7 lb since May 2022.

## 2022-08-10 ENCOUNTER — HOSPITAL ENCOUNTER (OUTPATIENT)
Dept: RADIOLOGY | Facility: HOSPITAL | Age: 52
Discharge: HOME | End: 2022-08-10
Attending: PHYSICIAN ASSISTANT
Payer: OTHER GOVERNMENT

## 2022-08-10 DIAGNOSIS — E04.9 GOITER: ICD-10-CM

## 2022-08-10 PROCEDURE — 76536 US EXAM OF HEAD AND NECK: CPT | Mod: 26,,, | Performed by: RADIOLOGY

## 2022-08-10 PROCEDURE — 76536 US SOFT TISSUE HEAD NECK THYROID: ICD-10-PCS | Mod: 26,,, | Performed by: RADIOLOGY

## 2022-08-10 PROCEDURE — 76536 US EXAM OF HEAD AND NECK: CPT | Mod: TC

## 2022-08-17 ENCOUNTER — LAB VISIT (OUTPATIENT)
Dept: LAB | Facility: CLINIC | Age: 52
End: 2022-08-17
Payer: OTHER GOVERNMENT

## 2022-08-17 DIAGNOSIS — Z79.4 TYPE 2 DIABETES MELLITUS WITH HYPERGLYCEMIA, WITH LONG-TERM CURRENT USE OF INSULIN: ICD-10-CM

## 2022-08-17 DIAGNOSIS — E55.9 HYPOVITAMINOSIS D: ICD-10-CM

## 2022-08-17 DIAGNOSIS — E11.65 TYPE 2 DIABETES MELLITUS WITH HYPERGLYCEMIA, WITH LONG-TERM CURRENT USE OF INSULIN: ICD-10-CM

## 2022-08-17 LAB
ALBUMIN SERPL BCP-MCNC: 3.7 G/DL (ref 3.5–5.2)
ANION GAP SERPL CALC-SCNC: 12 MMOL/L (ref 8–16)
BUN SERPL-MCNC: 12 MG/DL (ref 6–20)
CALCIUM SERPL-MCNC: 9.1 MG/DL (ref 8.7–10.5)
CHLORIDE SERPL-SCNC: 110 MMOL/L (ref 95–110)
CO2 SERPL-SCNC: 17 MMOL/L (ref 23–29)
CREAT SERPL-MCNC: 0.8 MG/DL (ref 0.5–1.4)
EST. GFR  (NO RACE VARIABLE): >60 ML/MIN/1.73 M^2
ESTIMATED AVG GLUCOSE: 120 MG/DL (ref 68–131)
GLUCOSE SERPL-MCNC: 128 MG/DL (ref 70–110)
HBA1C MFR BLD: 5.8 % (ref 4–5.6)
PHOSPHATE SERPL-MCNC: 3.4 MG/DL (ref 2.7–4.5)
POTASSIUM SERPL-SCNC: 4 MMOL/L (ref 3.5–5.1)
SODIUM SERPL-SCNC: 139 MMOL/L (ref 136–145)
TSH SERPL DL<=0.005 MIU/L-ACNC: 2.73 UIU/ML (ref 0.4–4)

## 2022-08-17 PROCEDURE — 84443 ASSAY THYROID STIM HORMONE: CPT | Performed by: PHYSICIAN ASSISTANT

## 2022-08-17 PROCEDURE — 36415 PR COLLECTION VENOUS BLOOD,VENIPUNCTURE: ICD-10-PCS | Mod: PN,,, | Performed by: STUDENT IN AN ORGANIZED HEALTH CARE EDUCATION/TRAINING PROGRAM

## 2022-08-17 PROCEDURE — 82570 ASSAY OF URINE CREATININE: CPT | Performed by: PHYSICIAN ASSISTANT

## 2022-08-17 PROCEDURE — 83036 HEMOGLOBIN GLYCOSYLATED A1C: CPT | Performed by: PHYSICIAN ASSISTANT

## 2022-08-17 PROCEDURE — 80069 RENAL FUNCTION PANEL: CPT | Performed by: PHYSICIAN ASSISTANT

## 2022-08-17 PROCEDURE — 82043 UR ALBUMIN QUANTITATIVE: CPT | Performed by: PHYSICIAN ASSISTANT

## 2022-08-17 PROCEDURE — 36415 COLL VENOUS BLD VENIPUNCTURE: CPT | Mod: PN,,, | Performed by: STUDENT IN AN ORGANIZED HEALTH CARE EDUCATION/TRAINING PROGRAM

## 2022-08-17 PROCEDURE — 82306 VITAMIN D 25 HYDROXY: CPT | Performed by: PHYSICIAN ASSISTANT

## 2022-08-18 LAB
25(OH)D3+25(OH)D2 SERPL-MCNC: 27 NG/ML (ref 30–96)
ALBUMIN/CREAT UR: 25.1 UG/MG (ref 0–30)
CREAT UR-MCNC: 251 MG/DL (ref 15–325)
MICROALBUMIN UR DL<=1MG/L-MCNC: 63 UG/ML

## 2022-08-24 ENCOUNTER — OFFICE VISIT (OUTPATIENT)
Dept: ENDOCRINOLOGY | Facility: CLINIC | Age: 52
End: 2022-08-24
Payer: OTHER GOVERNMENT

## 2022-08-24 VITALS
HEIGHT: 68 IN | TEMPERATURE: 98 F | SYSTOLIC BLOOD PRESSURE: 120 MMHG | WEIGHT: 277.88 LBS | HEART RATE: 98 BPM | DIASTOLIC BLOOD PRESSURE: 80 MMHG | BODY MASS INDEX: 42.11 KG/M2 | OXYGEN SATURATION: 96 %

## 2022-08-24 DIAGNOSIS — E66.9 OBESITY (BMI 30-39.9): ICD-10-CM

## 2022-08-24 DIAGNOSIS — E78.5 HYPERLIPIDEMIA, UNSPECIFIED HYPERLIPIDEMIA TYPE: ICD-10-CM

## 2022-08-24 DIAGNOSIS — E11.9 TYPE 2 DIABETES MELLITUS WITHOUT COMPLICATION, WITH LONG-TERM CURRENT USE OF INSULIN: ICD-10-CM

## 2022-08-24 DIAGNOSIS — E61.1 IRON DEFICIENCY: ICD-10-CM

## 2022-08-24 DIAGNOSIS — I10 ESSENTIAL HYPERTENSION: ICD-10-CM

## 2022-08-24 DIAGNOSIS — E53.8 VITAMIN B12 DEFICIENCY: ICD-10-CM

## 2022-08-24 DIAGNOSIS — E04.9 GOITER: ICD-10-CM

## 2022-08-24 DIAGNOSIS — E55.9 HYPOVITAMINOSIS D: ICD-10-CM

## 2022-08-24 DIAGNOSIS — Z79.4 TYPE 2 DIABETES MELLITUS WITH HYPERGLYCEMIA, WITH LONG-TERM CURRENT USE OF INSULIN: Primary | ICD-10-CM

## 2022-08-24 DIAGNOSIS — E11.65 TYPE 2 DIABETES MELLITUS WITH HYPERGLYCEMIA, WITH LONG-TERM CURRENT USE OF INSULIN: Primary | ICD-10-CM

## 2022-08-24 DIAGNOSIS — Z79.4 TYPE 2 DIABETES MELLITUS WITHOUT COMPLICATION, WITH LONG-TERM CURRENT USE OF INSULIN: ICD-10-CM

## 2022-08-24 PROCEDURE — 99999 PR PBB SHADOW E&M-EST. PATIENT-LVL IV: CPT | Mod: PBBFAC,,, | Performed by: PHYSICIAN ASSISTANT

## 2022-08-24 PROCEDURE — 99214 OFFICE O/P EST MOD 30 MIN: CPT | Mod: S$PBB,,, | Performed by: PHYSICIAN ASSISTANT

## 2022-08-24 PROCEDURE — 99214 PR OFFICE/OUTPT VISIT, EST, LEVL IV, 30-39 MIN: ICD-10-PCS | Mod: S$PBB,,, | Performed by: PHYSICIAN ASSISTANT

## 2022-08-24 PROCEDURE — 99214 OFFICE O/P EST MOD 30 MIN: CPT | Mod: PBBFAC,PO | Performed by: PHYSICIAN ASSISTANT

## 2022-08-24 PROCEDURE — 99999 PR PBB SHADOW E&M-EST. PATIENT-LVL IV: ICD-10-PCS | Mod: PBBFAC,,, | Performed by: PHYSICIAN ASSISTANT

## 2022-08-24 RX ORDER — ERGOCALCIFEROL 1.25 MG/1
CAPSULE ORAL
Qty: 36 CAPSULE | Refills: 3 | Status: SHIPPED | OUTPATIENT
Start: 2022-08-24

## 2022-08-24 RX ORDER — GABAPENTIN 300 MG/1
CAPSULE ORAL
Qty: 90 CAPSULE | Refills: 3 | Status: SHIPPED | OUTPATIENT
Start: 2022-08-24 | End: 2022-10-07

## 2022-08-24 RX ORDER — LEVOTHYROXINE SODIUM 88 UG/1
88 TABLET ORAL
Qty: 90 TABLET | Refills: 3 | Status: SHIPPED | OUTPATIENT
Start: 2022-08-24 | End: 2022-11-18

## 2022-08-24 NOTE — PATIENT INSTRUCTIONS
Increase ergocalciferol to 3x weekly. Increase Levothyroxine to 88 mcg daily. Decrease Actos to 15 mg daily. Continue Ozempic, Metformin, Jardiance.

## 2022-08-24 NOTE — PROGRESS NOTES
CC: This 52 y.o. female presents for management of Diabetes Mellitus  and chronic conditions pending review including HTN, HLP    HPI: was diagnosed with T2DM in >10 years on lab work.   Has never been hospitalized r/t DM.  Family hx of DM: parents, aunts, uncles  Fhx of thyroid disease: gf had thyroid cancer, aunts,   Denies missing doses of DM medication.   hypoglycemia at home: none  monitoring BG at home:  Fastins    Diet:   1 meal daily.  Ate red beans and rice yesterday w/ chicken.  Avoids sugary beverages.     Exercise: Rides 5 miles on the bike and two miles on the treadmill    CURRENT DM MEDS: Actos 30 mg qd, Ozempic 2 mg weekly, metformin 1000 qd, Jardiance 25 mg daily,      Standards of Care:  Eye exam: Not in a while due to insurance  Podiatry exam: Follows w/ Dr. Akira LIRA: last year    DEXA scan:  wnl. FSH is elevated. LH is normal. She has been taking ergocalciferol 2x weekly for longer than one year.     MNG  S/p left thyroidectomy -benign  Thyroid u/s  shows a 2.2 cm mass in the right thyroid. + occ sob and hoarseness. No dysphagia. FNAs were benign of both nodules .     Hypothyroidism  LT4 75 mcg qd  + fatigue, constipation, cold/heat intolerance, anxiety  No diarrhea or tremors.    PMHx, PSHx: reviewed in epic.  Social Hx: no E/T use.    Wt Readings from Last 6 Encounters:   22 126.1 kg (277 lb 14.2 oz)   22 125.5 kg (276 lb 10.8 oz)   22 128.5 kg (283 lb 4.7 oz)   22 126.3 kg (278 lb 7.1 oz)   21 126 kg (277 lb 12.5 oz)   21 (!) 141 kg (310 lb 12.8 oz)      ROS:   Gen: Appetite good, + wt stable, denies fatigue and weakness.  Skin: Skin is intact and heals well, no rashes, no hair changes  Eyes: Denies visual disturbances  Resp: no SOB or AGUILAR, no cough  Cardiac: No palpitations, chest pain, no edema   GI:  No nausea or vomiting, diarrhea, constipation, or abdominal pain.  /GYN:+ night sweats, No nocturia, burning or pain.   MS/Neuro: +  "knee pain, Denies numbness/ tingling in BLE; Gait steady, speech clear  Psych: Denies drug/ETOH abuse, no hx of depression.  Other systems: negative.    Pulse 98   Temp 98.2 °F (36.8 °C) (Oral)   Ht 5' 8" (1.727 m)   Wt 126.1 kg (277 lb 14.2 oz)   SpO2 96%   BMI 42.25 kg/m²      PE:  GENERAL: middle aged female,well developed, well nourished.  PSYCH: AAOx3, appropriate mood and affect, pleasant expression, conversant, appears relaxed, well groomed.   EYES: Conjunctiva, corneas clear  NECK: Supple, trachea midline,+ thyromegaly  CHEST: Resp even and unlabored, CTA bilateral.  CARDIAC: RRR, S1, S2 heard, no murmurs  VASCULAR: DP pulses +2/4 bilaterally, no edema.  NEURO: Gait steady, CN ll-Xll grossly intact  SKIN: Skin warm and dry no acanthosis nigracans.  8/22  Foot Exam: no sores or macerations noted.     Protective Sensation (w/ 10 gram monofilament):  Right: Intact  Left: Intact    Visual Inspection:  Normal -  Bilateral and Nails Intact - without Evidence of Foot Deformity- Bilateral    Pedal Pulses:   Right: Present  Left: Present    Posterior tibialis:   Right:Present  Left: Present     Vibratory Sensation  Right:Decreased  Left:Decreased    Personally reviewed labs below:    Lab Visit on 08/17/2022   Component Date Value Ref Range Status    Microalbumin, Urine 08/17/2022 63.0  ug/mL Final    Creatinine, Urine 08/17/2022 251.0  15.0 - 325.0 mg/dL Final    Microalb/Creat Ratio 08/17/2022 25.1  0.0 - 30.0 ug/mg Final   Lab Visit on 08/17/2022   Component Date Value Ref Range Status    TSH 08/17/2022 2.729  0.400 - 4.000 uIU/mL Final    Hemoglobin A1C 08/17/2022 5.8 (A) 4.0 - 5.6 % Final    Comment: ADA Screening Guidelines:  5.7-6.4%  Consistent with prediabetes  >or=6.5%  Consistent with diabetes    High levels of fetal hemoglobin interfere with the HbA1C  assay. Heterozygous hemoglobin variants (HbS, HgC, etc)do  not significantly interfere with this assay.   However, presence of multiple variants " may affect accuracy.      Estimated Avg Glucose 08/17/2022 120  68 - 131 mg/dL Final    Glucose 08/17/2022 128 (A) 70 - 110 mg/dL Final    Sodium 08/17/2022 139  136 - 145 mmol/L Final    Potassium 08/17/2022 4.0  3.5 - 5.1 mmol/L Final    Chloride 08/17/2022 110  95 - 110 mmol/L Final    CO2 08/17/2022 17 (A) 23 - 29 mmol/L Final    BUN 08/17/2022 12  6 - 20 mg/dL Final    Calcium 08/17/2022 9.1  8.7 - 10.5 mg/dL Final    Creatinine 08/17/2022 0.8  0.5 - 1.4 mg/dL Final    Albumin 08/17/2022 3.7  3.5 - 5.2 g/dL Final    Phosphorus 08/17/2022 3.4  2.7 - 4.5 mg/dL Final    eGFR 08/17/2022 >60  >60 mL/min/1.73 m^2 Final    Anion Gap 08/17/2022 12  8 - 16 mmol/L Final    Vit D, 25-Hydroxy 08/17/2022 27 (A) 30 - 96 ng/mL Final    Comment: Vitamin D deficiency.........<10 ng/mL                              Vitamin D insufficiency......10-29 ng/mL       Vitamin D sufficiency........> or equal to 30 ng/mL  Vitamin D toxicity............>100 ng/mL         ASSESSMENT and PLAN:    1. Type 2 diabetes mellitus with hyperglycemia, with long-term current use of insulin     2. Type 2 diabetes mellitus, uncontrolled, with neuropathy     3. Essential hypertension     4. Hyperlipidemia, unspecified hyperlipidemia type     5. Obesity (BMI 30-39.9)     6. Vitamin B12 deficiency     7. Goiter     8. Iron deficiency        T2DM with hyperglycemia, neuropathy-A1c is at goal. Readings are at goal. Pt is not having hypoglycemia.Decrease Actos to 15 mg daily. This will help with wt loss. Continue Ozempic, Jardiance and Metformin.  Discussed A1c and BG goals.   Reviewed  hypoglycemia, s/s and appropriate tx.   Instructed to monitor BG and bring meter/ log to every clinic visit.   - takes ASA, ACEi, statin  Neuropathy- continue gabapentin 300 mg nightly.  HTN -stable, continue meds as previously prescribed and monitor. Check bp daily and send log in one week.    HLP -stable LDL , continue crestor to 20 mg daily, LFTs WNL.    Obesity-Body mass index is 42.25 kg/m². Continue exercise and diet.  Hypovitaminosis d-low last time-continue ergocalciferol to 3x weekly. Ozempic will help with wt loss.   Postmenopausal-dexa scan 2/24.  Vitamin B 12 deficiency-check vb12. Pt previously on injections.  Nodular thyroid disease- S/p left thyroidectomy. Elevated TSH. Increase dose to 88 mcg daily. Recheck in six weeks.   Iron deficiency-check iron.    TSH, T4, vb12  in six weeks  Follow-up: in 3 months with fasting lab prior

## 2022-08-31 DIAGNOSIS — Z11.59 NEED FOR HEPATITIS C SCREENING TEST: ICD-10-CM

## 2022-09-14 DIAGNOSIS — E11.9 TYPE 2 DIABETES MELLITUS WITHOUT COMPLICATION, UNSPECIFIED WHETHER LONG TERM INSULIN USE: ICD-10-CM

## 2022-09-15 ENCOUNTER — OFFICE VISIT (OUTPATIENT)
Dept: URGENT CARE | Facility: CLINIC | Age: 52
End: 2022-09-15
Payer: OTHER GOVERNMENT

## 2022-09-15 VITALS
WEIGHT: 275 LBS | DIASTOLIC BLOOD PRESSURE: 86 MMHG | OXYGEN SATURATION: 97 % | SYSTOLIC BLOOD PRESSURE: 139 MMHG | HEART RATE: 100 BPM | BODY MASS INDEX: 40.73 KG/M2 | RESPIRATION RATE: 20 BRPM | TEMPERATURE: 98 F | HEIGHT: 69 IN

## 2022-09-15 DIAGNOSIS — M25.512 ACUTE PAIN OF LEFT SHOULDER: ICD-10-CM

## 2022-09-15 DIAGNOSIS — R52 BODY ACHES: Primary | ICD-10-CM

## 2022-09-15 DIAGNOSIS — J01.10 ACUTE NON-RECURRENT FRONTAL SINUSITIS: ICD-10-CM

## 2022-09-15 LAB
CTP QC/QA: YES
CTP QC/QA: YES
FLUAV AG NPH QL: NEGATIVE
FLUBV AG NPH QL: NEGATIVE
SARS-COV-2 AG RESP QL IA.RAPID: NEGATIVE

## 2022-09-15 PROCEDURE — 87804 INFLUENZA ASSAY W/OPTIC: CPT | Mod: QW,,,

## 2022-09-15 PROCEDURE — 99214 PR OFFICE/OUTPT VISIT, EST, LEVL IV, 30-39 MIN: ICD-10-PCS | Mod: S$GLB,,,

## 2022-09-15 PROCEDURE — 87804 POCT INFLUENZA A/B: ICD-10-PCS | Mod: 59,QW,,

## 2022-09-15 PROCEDURE — 87811 SARS-COV-2 COVID19 W/OPTIC: CPT | Mod: QW,S$GLB,,

## 2022-09-15 PROCEDURE — 87811 SARS CORONAVIRUS 2 ANTIGEN POCT, MANUAL READ: ICD-10-PCS | Mod: QW,S$GLB,,

## 2022-09-15 PROCEDURE — 99214 OFFICE O/P EST MOD 30 MIN: CPT | Mod: S$GLB,,,

## 2022-09-15 RX ORDER — AZELASTINE 1 MG/ML
1 SPRAY, METERED NASAL 2 TIMES DAILY
Qty: 30 ML | Refills: 0 | Status: SHIPPED | OUTPATIENT
Start: 2022-09-15 | End: 2023-01-06

## 2022-09-15 RX ORDER — AMOXICILLIN 875 MG/1
875 TABLET, FILM COATED ORAL EVERY 12 HOURS
Qty: 14 TABLET | Refills: 0 | Status: SHIPPED | OUTPATIENT
Start: 2022-09-15 | End: 2022-09-22

## 2022-09-15 NOTE — PROGRESS NOTES
"Subjective:       Patient ID: Patria Bennett is a 52 y.o. female.    Vitals:  height is 5' 9" (1.753 m) and weight is 124.7 kg (275 lb). Her oral temperature is 98.1 °F (36.7 °C). Her blood pressure is 139/86 and her pulse is 100. Her respiration is 20 and oxygen saturation is 97%.     Chief Complaint: Sinus Problem    Pt also states "Left shoulder pain x's 4 days; no injury."    Sinus Problem  This is a new problem. Episode onset: 4 days ago. The problem has been gradually worsening since onset. There has been no fever. Associated symptoms include congestion, ear pain, sinus pressure, a sore throat and swollen glands. Pertinent negatives include no chills, coughing, diaphoresis or shortness of breath. Treatments tried: Sudafed, Nyquil. The treatment provided no relief.     Constitution: Negative for activity change, appetite change, chills, sweating and fever.   HENT:  Positive for ear pain, congestion, sinus pressure and sore throat. Negative for sinus pain.    Cardiovascular:  Negative for chest pain.   Eyes:  Negative for blurred vision.   Respiratory:  Negative for chest tightness, cough and shortness of breath.    Gastrointestinal:  Negative for abdominal pain.   Musculoskeletal:  Positive for joint pain (left shoulder pain).   Neurological:  Negative for dizziness and history of vertigo.     Objective:      Physical Exam   Constitutional:  Non-toxic appearance. She does not appear ill. No distress.   HENT:   Head: Normocephalic.   Ears:   Right Ear: Tympanic membrane, external ear and ear canal normal.   Left Ear: Tympanic membrane, external ear and ear canal normal.   Nose: Right sinus exhibits frontal sinus tenderness. Left sinus exhibits frontal sinus tenderness.   Mouth/Throat: Mucous membranes are moist. No oropharyngeal exudate or posterior oropharyngeal erythema.   Eyes: Conjunctivae are normal. Extraocular movement intact   Cardiovascular: Normal rate, normal heart sounds and normal pulses. "   Pulmonary/Chest: Effort normal and breath sounds normal. No respiratory distress. She has no wheezes.   Musculoskeletal:      Left shoulder: She exhibits tenderness. She exhibits normal range of motion, no swelling and normal strength.        Arms:    Neurological: no focal deficit. She is alert.   Skin: Skin is not diaphoretic. Capillary refill takes 2 to 3 seconds.   Psychiatric: Her behavior is normal. Mood normal.     Pain at the front of left shoulder with palpation where x is brionna, patient has normal abduction and adduction, full ROM.   Assessment:       1. Body aches    2. Acute non-recurrent frontal sinusitis    3. Acute pain of left shoulder          Plan:         Body aches  -     SARS Coronavirus 2 Antigen, POCT Manual Read  -     POCT Influenza A/B    Acute non-recurrent frontal sinusitis  -     azelastine (ASTELIN) 137 mcg (0.1 %) nasal spray; 1 spray (137 mcg total) by Nasal route 2 (two) times daily.  Dispense: 30 mL; Refill: 0  -     amoxicillin (AMOXIL) 875 MG tablet; Take 1 tablet (875 mg total) by mouth every 12 (twelve) hours. for 7 days  Dispense: 14 tablet; Refill: 0    Acute pain of left shoulder       Patient presents with sinus congestion and pressure 4-5 days and left shoulder pain. Covid/flu negative, reports history of sinus infections, will cover for bacterial sinusitis.    Denies truama, fall or injury to left shoulder. Having pain on the front of the shoulder near deltoid, full Rom, will do a trail of RICE and pt already taking mobic, instructed to fu with ortho if symptoms persist.

## 2022-09-15 NOTE — PATIENT INSTRUCTIONS
For the next 2-3 days do a trail of Claritin  or zrytec .   Rotate tylenol and ibuprofen as needed if no contraindications for fever and pain.  Use Flonase and Astelin for nasal congestion.   Follow up with ENT in 1-2 weeks if symptoms do not resolve.      Start antibiotics in 48-72 hours if symptoms not improving or worsen with zyrtec/astelin.     [facility]  Phone: [phone]  Email: [email or web address]  Address: [address] [place] [address]    Take mobic as needed for pain.     Rest, Ice, elevate and compression of the affected extremity.     If no improvements with conservative measurements follow up with Orthopedics as directed.      [facility] [place]: [phone]

## 2022-10-05 ENCOUNTER — LAB VISIT (OUTPATIENT)
Dept: LAB | Facility: CLINIC | Age: 52
End: 2022-10-05
Payer: OTHER GOVERNMENT

## 2022-10-05 DIAGNOSIS — E11.65 TYPE 2 DIABETES MELLITUS WITH HYPERGLYCEMIA, WITH LONG-TERM CURRENT USE OF INSULIN: ICD-10-CM

## 2022-10-05 DIAGNOSIS — E53.8 VITAMIN B12 DEFICIENCY: ICD-10-CM

## 2022-10-05 DIAGNOSIS — Z79.4 TYPE 2 DIABETES MELLITUS WITH HYPERGLYCEMIA, WITH LONG-TERM CURRENT USE OF INSULIN: ICD-10-CM

## 2022-10-05 LAB
T4 FREE SERPL-MCNC: 0.82 NG/DL (ref 0.71–1.51)
TSH SERPL DL<=0.005 MIU/L-ACNC: 1.95 UIU/ML (ref 0.4–4)
VIT B12 SERPL-MCNC: 632 PG/ML (ref 210–950)

## 2022-10-05 PROCEDURE — 84439 ASSAY OF FREE THYROXINE: CPT | Performed by: PHYSICIAN ASSISTANT

## 2022-10-05 PROCEDURE — 82607 VITAMIN B-12: CPT | Performed by: PHYSICIAN ASSISTANT

## 2022-10-05 PROCEDURE — 36415 PR COLLECTION VENOUS BLOOD,VENIPUNCTURE: ICD-10-PCS | Mod: PN,,, | Performed by: STUDENT IN AN ORGANIZED HEALTH CARE EDUCATION/TRAINING PROGRAM

## 2022-10-05 PROCEDURE — 36415 COLL VENOUS BLD VENIPUNCTURE: CPT | Mod: PN,,, | Performed by: STUDENT IN AN ORGANIZED HEALTH CARE EDUCATION/TRAINING PROGRAM

## 2022-10-05 PROCEDURE — 84443 ASSAY THYROID STIM HORMONE: CPT | Performed by: PHYSICIAN ASSISTANT

## 2022-10-07 ENCOUNTER — OFFICE VISIT (OUTPATIENT)
Dept: FAMILY MEDICINE | Facility: CLINIC | Age: 52
End: 2022-10-07
Payer: OTHER GOVERNMENT

## 2022-10-07 VITALS
WEIGHT: 274.88 LBS | SYSTOLIC BLOOD PRESSURE: 132 MMHG | BODY MASS INDEX: 40.71 KG/M2 | HEIGHT: 69 IN | DIASTOLIC BLOOD PRESSURE: 94 MMHG | HEART RATE: 85 BPM | OXYGEN SATURATION: 98 %

## 2022-10-07 DIAGNOSIS — I10 ESSENTIAL HYPERTENSION: ICD-10-CM

## 2022-10-07 DIAGNOSIS — Z79.4 TYPE 2 DIABETES MELLITUS WITHOUT COMPLICATION, WITH LONG-TERM CURRENT USE OF INSULIN: ICD-10-CM

## 2022-10-07 DIAGNOSIS — K58.1 IRRITABLE BOWEL SYNDROME WITH CONSTIPATION: Primary | ICD-10-CM

## 2022-10-07 DIAGNOSIS — M79.7 FIBROMYALGIA: ICD-10-CM

## 2022-10-07 DIAGNOSIS — E11.9 TYPE 2 DIABETES MELLITUS WITHOUT COMPLICATION, WITH LONG-TERM CURRENT USE OF INSULIN: ICD-10-CM

## 2022-10-07 DIAGNOSIS — M79.604 LEG PAIN, BILATERAL: ICD-10-CM

## 2022-10-07 DIAGNOSIS — M79.605 LEG PAIN, BILATERAL: ICD-10-CM

## 2022-10-07 PROCEDURE — 99999 PR PBB SHADOW E&M-EST. PATIENT-LVL V: CPT | Mod: PBBFAC,,, | Performed by: FAMILY MEDICINE

## 2022-10-07 PROCEDURE — 99999 PR PBB SHADOW E&M-EST. PATIENT-LVL V: ICD-10-PCS | Mod: PBBFAC,,, | Performed by: FAMILY MEDICINE

## 2022-10-07 PROCEDURE — 99214 OFFICE O/P EST MOD 30 MIN: CPT | Mod: S$PBB,,, | Performed by: FAMILY MEDICINE

## 2022-10-07 PROCEDURE — 99214 PR OFFICE/OUTPT VISIT, EST, LEVL IV, 30-39 MIN: ICD-10-PCS | Mod: S$PBB,,, | Performed by: FAMILY MEDICINE

## 2022-10-07 PROCEDURE — 99215 OFFICE O/P EST HI 40 MIN: CPT | Mod: PBBFAC,PN | Performed by: FAMILY MEDICINE

## 2022-10-07 RX ORDER — GABAPENTIN 300 MG/1
CAPSULE ORAL
Qty: 180 CAPSULE | Refills: 3 | Status: SHIPPED | OUTPATIENT
Start: 2022-10-07 | End: 2023-09-21 | Stop reason: SDUPTHER

## 2022-10-07 NOTE — PROGRESS NOTES
Subjective:       Patient ID: Patria Bennett is a 52 y.o. female.    Chief Complaint: Follow-up (2 mth f/u for HTN), Hypertension, and Leg Pain (Pt c/o ongoing bilateral leg pain. )    Following up after establishing care in August.     Essential hypertension: Blood pressures have been labile  BP Readings from Last 3 Encounters:  10/07/22 : (!) 132/94  09/15/22 : 139/86  08/24/22 : 120/80     Hypertension Medications     valsartan-hydrochlorothiazide (DIOVAN-HCT) 160-25 mg per tablet Take 1   tablet by mouth once daily.       Patient denies headache, chest pain, palpitations, shortness of breath.     Bilateral persistent leg pain and heavyness. Patient feels knots and wants to rule out blood clots.     Also has IBS with constipation and was previously on Linzess. Would like to restart if possible.       Review of Systems   Constitutional:  Negative for activity change, appetite change and fever.   Eyes:  Negative for visual disturbance.   Respiratory:  Negative for cough and shortness of breath.    Cardiovascular:  Negative for chest pain.   Gastrointestinal:  Positive for abdominal distention and constipation.   Endocrine: Negative for polydipsia, polyphagia and polyuria.   Genitourinary:  Negative for dysuria and pelvic pain.   Musculoskeletal:  Positive for leg pain.   Integumentary:  Negative for breast mass.   Neurological:  Negative for headaches.   Psychiatric/Behavioral:  Negative for sleep disturbance.    Breast: Negative for mass      Objective:      Physical Exam  Vitals and nursing note reviewed.   Constitutional:       General: She is not in acute distress.     Appearance: She is not ill-appearing.   Cardiovascular:      Rate and Rhythm: Normal rate and regular rhythm.      Heart sounds: No murmur heard.  Pulmonary:      Effort: Pulmonary effort is normal.      Breath sounds: Normal breath sounds. No wheezing.   Skin:     General: Skin is warm and dry.      Findings: No rash.   Neurological:       Mental Status: She is alert.   Psychiatric:         Mood and Affect: Mood normal.         Behavior: Behavior normal.       Assessment:       1. Irritable bowel syndrome with constipation    2. Fibromyalgia    3. Type 2 diabetes mellitus without complication, with long-term current use of insulin    4. Essential hypertension    5. Leg pain, bilateral        Plan:       Problem List Items Addressed This Visit          Cardiac/Vascular    Essential hypertension     Mildly elevated. 2 week blood pressure check then consider med change if persistent elevation            Endocrine    Type 2 diabetes mellitus without complication    Relevant Medications    gabapentin (NEURONTIN) 300 MG capsule     Other Visit Diagnoses       Irritable bowel syndrome with constipation    -  Primary    Relevant Medications    linaCLOtide (LINZESS) 290 mcg Cap capsule    Fibromyalgia        Leg pain, bilateral        Relevant Orders    US Lower Extremity Veins Right    US Lower Extremity Veins Left

## 2022-11-11 ENCOUNTER — LAB VISIT (OUTPATIENT)
Dept: LAB | Facility: CLINIC | Age: 52
End: 2022-11-11
Payer: OTHER GOVERNMENT

## 2022-11-11 DIAGNOSIS — E61.1 IRON DEFICIENCY: ICD-10-CM

## 2022-11-11 DIAGNOSIS — Z79.4 TYPE 2 DIABETES MELLITUS WITH HYPERGLYCEMIA, WITH LONG-TERM CURRENT USE OF INSULIN: ICD-10-CM

## 2022-11-11 DIAGNOSIS — E11.65 TYPE 2 DIABETES MELLITUS WITH HYPERGLYCEMIA, WITH LONG-TERM CURRENT USE OF INSULIN: ICD-10-CM

## 2022-11-11 DIAGNOSIS — E53.8 VITAMIN B12 DEFICIENCY: ICD-10-CM

## 2022-11-11 LAB
ALBUMIN SERPL BCP-MCNC: 4 G/DL (ref 3.5–5.2)
ALP SERPL-CCNC: 55 U/L (ref 55–135)
ALT SERPL W/O P-5'-P-CCNC: 18 U/L (ref 10–44)
ANION GAP SERPL CALC-SCNC: 12 MMOL/L (ref 8–16)
AST SERPL-CCNC: 18 U/L (ref 10–40)
BILIRUB SERPL-MCNC: 0.8 MG/DL (ref 0.1–1)
BUN SERPL-MCNC: 16 MG/DL (ref 6–20)
CALCIUM SERPL-MCNC: 9.7 MG/DL (ref 8.7–10.5)
CHLORIDE SERPL-SCNC: 105 MMOL/L (ref 95–110)
CHOLEST SERPL-MCNC: 185 MG/DL (ref 120–199)
CHOLEST/HDLC SERPL: 5.3 {RATIO} (ref 2–5)
CO2 SERPL-SCNC: 21 MMOL/L (ref 23–29)
CREAT SERPL-MCNC: 0.9 MG/DL (ref 0.5–1.4)
EST. GFR  (NO RACE VARIABLE): >60 ML/MIN/1.73 M^2
ESTIMATED AVG GLUCOSE: 140 MG/DL (ref 68–131)
GLUCOSE SERPL-MCNC: 160 MG/DL (ref 70–110)
HBA1C MFR BLD: 6.5 % (ref 4–5.6)
HDLC SERPL-MCNC: 35 MG/DL (ref 40–75)
HDLC SERPL: 18.9 % (ref 20–50)
IRON SERPL-MCNC: 54 UG/DL (ref 30–160)
LDLC SERPL CALC-MCNC: 110.8 MG/DL (ref 63–159)
NONHDLC SERPL-MCNC: 150 MG/DL
POTASSIUM SERPL-SCNC: 3.8 MMOL/L (ref 3.5–5.1)
PROT SERPL-MCNC: 7.7 G/DL (ref 6–8.4)
SATURATED IRON: 14 % (ref 20–50)
SODIUM SERPL-SCNC: 138 MMOL/L (ref 136–145)
T4 FREE SERPL-MCNC: 0.84 NG/DL (ref 0.71–1.51)
TOTAL IRON BINDING CAPACITY: 389 UG/DL (ref 250–450)
TRANSFERRIN SERPL-MCNC: 263 MG/DL (ref 200–375)
TRIGL SERPL-MCNC: 196 MG/DL (ref 30–150)
TSH SERPL DL<=0.005 MIU/L-ACNC: 4.28 UIU/ML (ref 0.4–4)
VIT B12 SERPL-MCNC: 414 PG/ML (ref 210–950)

## 2022-11-11 PROCEDURE — 82607 VITAMIN B-12: CPT | Performed by: PHYSICIAN ASSISTANT

## 2022-11-11 PROCEDURE — 83036 HEMOGLOBIN GLYCOSYLATED A1C: CPT | Performed by: PHYSICIAN ASSISTANT

## 2022-11-11 PROCEDURE — 84443 ASSAY THYROID STIM HORMONE: CPT | Performed by: PHYSICIAN ASSISTANT

## 2022-11-11 PROCEDURE — 80061 LIPID PANEL: CPT | Performed by: PHYSICIAN ASSISTANT

## 2022-11-11 PROCEDURE — 36415 PR COLLECTION VENOUS BLOOD,VENIPUNCTURE: ICD-10-PCS | Mod: PN,,, | Performed by: STUDENT IN AN ORGANIZED HEALTH CARE EDUCATION/TRAINING PROGRAM

## 2022-11-11 PROCEDURE — 84439 ASSAY OF FREE THYROXINE: CPT | Performed by: PHYSICIAN ASSISTANT

## 2022-11-11 PROCEDURE — 84466 ASSAY OF TRANSFERRIN: CPT | Performed by: PHYSICIAN ASSISTANT

## 2022-11-11 PROCEDURE — 80053 COMPREHEN METABOLIC PANEL: CPT | Performed by: PHYSICIAN ASSISTANT

## 2022-11-11 PROCEDURE — 36415 COLL VENOUS BLD VENIPUNCTURE: CPT | Mod: PN,,, | Performed by: STUDENT IN AN ORGANIZED HEALTH CARE EDUCATION/TRAINING PROGRAM

## 2022-11-18 ENCOUNTER — OFFICE VISIT (OUTPATIENT)
Dept: ENDOCRINOLOGY | Facility: CLINIC | Age: 52
End: 2022-11-18
Payer: OTHER GOVERNMENT

## 2022-11-18 VITALS
WEIGHT: 275.69 LBS | OXYGEN SATURATION: 97 % | DIASTOLIC BLOOD PRESSURE: 82 MMHG | TEMPERATURE: 98 F | BODY MASS INDEX: 40.83 KG/M2 | HEART RATE: 98 BPM | SYSTOLIC BLOOD PRESSURE: 126 MMHG | HEIGHT: 69 IN

## 2022-11-18 DIAGNOSIS — E04.9 GOITER: ICD-10-CM

## 2022-11-18 DIAGNOSIS — E11.9 TYPE 2 DIABETES MELLITUS WITHOUT COMPLICATION, WITHOUT LONG-TERM CURRENT USE OF INSULIN: Primary | ICD-10-CM

## 2022-11-18 DIAGNOSIS — E78.5 HYPERLIPIDEMIA, UNSPECIFIED HYPERLIPIDEMIA TYPE: ICD-10-CM

## 2022-11-18 DIAGNOSIS — Z78.0 POSTMENOPAUSAL: ICD-10-CM

## 2022-11-18 DIAGNOSIS — E61.1 IRON DEFICIENCY: ICD-10-CM

## 2022-11-18 DIAGNOSIS — E66.9 OBESITY (BMI 30-39.9): ICD-10-CM

## 2022-11-18 DIAGNOSIS — I10 ESSENTIAL HYPERTENSION: ICD-10-CM

## 2022-11-18 DIAGNOSIS — E53.8 VITAMIN B12 DEFICIENCY: ICD-10-CM

## 2022-11-18 DIAGNOSIS — E03.9 HYPOTHYROIDISM, UNSPECIFIED TYPE: ICD-10-CM

## 2022-11-18 DIAGNOSIS — E55.9 HYPOVITAMINOSIS D: ICD-10-CM

## 2022-11-18 PROCEDURE — 99214 PR OFFICE/OUTPT VISIT, EST, LEVL IV, 30-39 MIN: ICD-10-PCS | Mod: S$PBB,,, | Performed by: PHYSICIAN ASSISTANT

## 2022-11-18 PROCEDURE — 99999 PR PBB SHADOW E&M-EST. PATIENT-LVL IV: CPT | Mod: PBBFAC,,, | Performed by: PHYSICIAN ASSISTANT

## 2022-11-18 PROCEDURE — 99214 OFFICE O/P EST MOD 30 MIN: CPT | Mod: S$PBB,,, | Performed by: PHYSICIAN ASSISTANT

## 2022-11-18 PROCEDURE — 99999 PR PBB SHADOW E&M-EST. PATIENT-LVL IV: ICD-10-PCS | Mod: PBBFAC,,, | Performed by: PHYSICIAN ASSISTANT

## 2022-11-18 PROCEDURE — 99214 OFFICE O/P EST MOD 30 MIN: CPT | Mod: PBBFAC,PO | Performed by: PHYSICIAN ASSISTANT

## 2022-11-18 RX ORDER — TIRZEPATIDE 10 MG/.5ML
10 INJECTION, SOLUTION SUBCUTANEOUS
Qty: 4 PEN | Refills: 11 | Status: SHIPPED | OUTPATIENT
Start: 2022-11-18 | End: 2022-12-01

## 2022-11-18 RX ORDER — LEVOTHYROXINE SODIUM 100 UG/1
100 TABLET ORAL
Qty: 30 TABLET | Refills: 11 | Status: SHIPPED | OUTPATIENT
Start: 2022-11-18 | End: 2023-08-07

## 2022-11-18 RX ORDER — TIRZEPATIDE 7.5 MG/.5ML
7.5 INJECTION, SOLUTION SUBCUTANEOUS
Qty: 4 PEN | Refills: 0 | Status: SHIPPED | OUTPATIENT
Start: 2022-11-18 | End: 2022-11-25 | Stop reason: SDUPTHER

## 2022-11-18 NOTE — PROGRESS NOTES
CC: This 52 y.o. female presents for management of Diabetes Mellitus  and chronic conditions pending review including HTN, HLP    HPI: was diagnosed with T2DM in >10 years on lab work.   Has never been hospitalized r/t DM.  Family hx of DM: parents, aunts, uncles  Fhx of thyroid disease: gf had thyroid cancer, aunts,   Denies missing doses of DM medication.   hypoglycemia at home: none  monitoring BG at home:  Fastins    Diet:   1 meal daily.  Ate soup yesterday.  Avoids sugary beverages.     Exercise: Walks 5 miles on the treadmill    CURRENT DM MEDS: Actos 30 mg qd (not taking), Ozempic 2 mg weekly, metformin 1000 qd, Jardiance 25 mg daily,      Standards of Care:  Eye exam: Not in a while due to insurance  Podiatry exam: Follows w/ Dr. Akira LIRA: last year    DEXA scan:  wnl. FSH is elevated. LH is normal. She has been taking ergocalciferol 2x weekly for longer than one year.     MNG  S/p left thyroidectomy -benign  Thyroid u/s  shows a 2.2 cm mass in the right thyroid. + occ sob and hoarseness. No dysphagia. FNAs were benign of both nodules .     Hypothyroidism  LT4 88 mcg qd  + fatigue, constipation, sweating, anxiety  No diarrhea or tremors.    PMHx, PSHx: reviewed in epic.  Social Hx: no E/T use.    Wt Readings from Last 6 Encounters:   22 125 kg (275 lb 11 oz)   10/07/22 124.7 kg (274 lb 14.4 oz)   09/15/22 124.7 kg (275 lb)   22 126.1 kg (277 lb 14.2 oz)   22 125.5 kg (276 lb 10.8 oz)   22 128.5 kg (283 lb 4.7 oz)      ROS:   Gen: Appetite good, + wt stable, denies fatigue and weakness.  Skin: Skin is intact and heals well, no rashes, no hair changes  Eyes: Denies visual disturbances  Resp: no SOB or AGUILAR, no cough  Cardiac: No palpitations, chest pain, no edema   GI:  No nausea or vomiting, diarrhea, constipation, or abdominal pain.  /GYN:+ night sweats, No nocturia, burning or pain.   MS/Neuro: + knee pain, Denies numbness/ tingling in BLE; Gait steady,  "speech clear  Psych: Denies drug/ETOH abuse, no hx of depression.  Other systems: negative.    /82 (BP Location: Left arm, Patient Position: Sitting, BP Method: Large (Manual))   Pulse 98   Temp 98.2 °F (36.8 °C) (Oral)   Ht 5' 9" (1.753 m)   Wt 125 kg (275 lb 11 oz)   SpO2 97%   BMI 40.71 kg/m²      PE:  GENERAL: middle aged female,well developed, well nourished.  PSYCH: AAOx3, appropriate mood and affect, pleasant expression, conversant, appears relaxed, well groomed.   EYES: Conjunctiva, corneas clear  NECK: Supple, trachea midline,+ thyromegaly  CHEST: Resp even and unlabored, CTA bilateral.  CARDIAC: RRR, S1, S2 heard, no murmurs  VASCULAR: DP pulses +2/4 bilaterally, no edema.  NEURO: Gait steady, CN ll-Xll grossly intact  SKIN: Skin warm and dry no acanthosis nigracans.  8/22  Foot Exam: no sores or macerations noted.     Protective Sensation (w/ 10 gram monofilament):  Right: Intact  Left: Intact    Visual Inspection:  Normal -  Bilateral and Nails Intact - without Evidence of Foot Deformity- Bilateral    Pedal Pulses:   Right: Present  Left: Present    Posterior tibialis:   Right:Present  Left: Present     Vibratory Sensation  Right:Decreased  Left:Decreased    Personally reviewed labs below:    Lab Visit on 11/11/2022   Component Date Value Ref Range Status    Free T4 11/11/2022 0.84  0.71 - 1.51 ng/dL Final    Cholesterol 11/11/2022 185  120 - 199 mg/dL Final    Comment: The National Cholesterol Education Program (NCEP) has set the  following guidelines (reference ranges) for Cholesterol:  Optimal.....................<200 mg/dL  Borderline High.............200-239 mg/dL  High........................> or = 240 mg/dL      Triglycerides 11/11/2022 196 (H)  30 - 150 mg/dL Final    Comment: The National Cholesterol Education Program (NCEP) has set the  following guidelines (reference values) for triglycerides:  Normal......................<150 mg/dL  Borderline High.............150-199 " mg/dL  High........................200-499 mg/dL      HDL 11/11/2022 35 (L)  40 - 75 mg/dL Final    Comment: The National Cholesterol Education Program (NCEP) has set the  following guidelines (reference values) for HDL Cholesterol:  Low...............<40 mg/dL  Optimal...........>60 mg/dL      LDL Cholesterol 11/11/2022 110.8  63.0 - 159.0 mg/dL Final    Comment: The National Cholesterol Education Program (NCEP) has set the  following guidelines (reference values) for LDL Cholesterol:  Optimal.......................<130 mg/dL  Borderline High...............130-159 mg/dL  High..........................160-189 mg/dL  Very High.....................>190 mg/dL      HDL/Cholesterol Ratio 11/11/2022 18.9 (L)  20.0 - 50.0 % Final    Total Cholesterol/HDL Ratio 11/11/2022 5.3 (H)  2.0 - 5.0 Final    Non-HDL Cholesterol 11/11/2022 150  mg/dL Final    Comment: Risk category and Non-HDL cholesterol goals:  Coronary heart disease (CHD)or equivalent (10-year risk of CHD >20%):  Non-HDL cholesterol goal     <130 mg/dL  Two or more CHD risk factors and 10-year risk of CHD <= 20%:  Non-HDL cholesterol goal     <160 mg/dL  0 to 1 CHD risk factor:  Non-HDL cholesterol goal     <190 mg/dL      Hemoglobin A1C 11/11/2022 6.5 (H)  4.0 - 5.6 % Final    Comment: ADA Screening Guidelines:  5.7-6.4%  Consistent with prediabetes  >or=6.5%  Consistent with diabetes    High levels of fetal hemoglobin interfere with the HbA1C  assay. Heterozygous hemoglobin variants (HbS, HgC, etc)do  not significantly interfere with this assay.   However, presence of multiple variants may affect accuracy.      Estimated Avg Glucose 11/11/2022 140 (H)  68 - 131 mg/dL Final    Sodium 11/11/2022 138  136 - 145 mmol/L Final    Potassium 11/11/2022 3.8  3.5 - 5.1 mmol/L Final    Chloride 11/11/2022 105  95 - 110 mmol/L Final    CO2 11/11/2022 21 (L)  23 - 29 mmol/L Final    Glucose 11/11/2022 160 (H)  70 - 110 mg/dL Final    BUN 11/11/2022 16  6 - 20 mg/dL Final     Creatinine 11/11/2022 0.9  0.5 - 1.4 mg/dL Final    Calcium 11/11/2022 9.7  8.7 - 10.5 mg/dL Final    Total Protein 11/11/2022 7.7  6.0 - 8.4 g/dL Final    Albumin 11/11/2022 4.0  3.5 - 5.2 g/dL Final    Total Bilirubin 11/11/2022 0.8  0.1 - 1.0 mg/dL Final    Comment: For infants and newborns, interpretation of results should be based  on gestational age, weight and in agreement with clinical  observations.    Premature Infant recommended reference ranges:  Up to 24 hours.............<8.0 mg/dL  Up to 48 hours............<12.0 mg/dL  3-5 days..................<15.0 mg/dL  6-29 days.................<15.0 mg/dL      Alkaline Phosphatase 11/11/2022 55  55 - 135 U/L Final    AST 11/11/2022 18  10 - 40 U/L Final    ALT 11/11/2022 18  10 - 44 U/L Final    Anion Gap 11/11/2022 12  8 - 16 mmol/L Final    eGFR 11/11/2022 >60  >60 mL/min/1.73 m^2 Final    TSH 11/11/2022 4.281 (H)  0.400 - 4.000 uIU/mL Final    Iron 11/11/2022 54  30 - 160 ug/dL Final    Transferrin 11/11/2022 263  200 - 375 mg/dL Final    TIBC 11/11/2022 389  250 - 450 ug/dL Final    Saturated Iron 11/11/2022 14 (L)  20 - 50 % Final    Vitamin B-12 11/11/2022 414  210 - 950 pg/mL Final       ASSESSMENT and PLAN:    1. Type 2 diabetes mellitus without complication, without long-term current use of insulin        2. Essential hypertension        3. Hyperlipidemia, unspecified hyperlipidemia type        4. Obesity (BMI 30-39.9)        5. Hypovitaminosis D        6. Postmenopausal        7. Vitamin B12 deficiency        8. Goiter        9. Iron deficiency           T2DM with hyperglycemia, neuropathy-A1c is at goal. Readings are at goal. Change Ozempic to Mounjaro 7.5 mg weekly. Increase the dose to 10 mg after one month. Continue Metformin and Jardiance.This will help with wt loss.  Discussed A1c and BG goals.   Reviewed  hypoglycemia, s/s and appropriate tx.   Instructed to monitor BG and bring meter/ log to every clinic visit.   - takes ASA, ACEi,  statin  Neuropathy- continue gabapentin 300 mg nightly.  HTN -stable, continue meds as previously prescribed and monitor. Check bp daily and send log in one week.    HLP -elevated  LDL , Could have increased from bs. Recheck next time. Continue crestor to 20 mg daily, LFTs WNL.   Obesity-Body mass index is 40.71 kg/m². Continue exercise and diet.  Hypovitaminosis d-low last time-continue ergocalciferol to 3x weekly. Mounjaro will help with wt loss.   Postmenopausal-dexa scan 2/24.  Vitamin B 12 [id number] injections.  Nodular thyroid disease- S/p left thyroidectomy. Elevated TSH. Increase dose to 100 mcg daily. Recheck in six weeks.   Iron deficiency-stable-monitor-continue iron.    Cardiology number  TFTs in six weeks  Follow-up: in 3 months with fasting lab prior

## 2022-11-18 NOTE — PATIENT INSTRUCTIONS
Increase Levothyroxine to 100 mcg daily. Change Ozempic to Mounjaro 7.5 mg weekly. Increase the dose to 10 mg after one month. Continue Metformin and Jardiance.

## 2022-11-25 DIAGNOSIS — E11.9 TYPE 2 DIABETES MELLITUS WITHOUT COMPLICATION, WITHOUT LONG-TERM CURRENT USE OF INSULIN: ICD-10-CM

## 2022-11-25 RX ORDER — TIRZEPATIDE 7.5 MG/.5ML
7.5 INJECTION, SOLUTION SUBCUTANEOUS
Qty: 4 PEN | Refills: 0 | Status: SHIPPED | OUTPATIENT
Start: 2022-11-25 | End: 2022-11-29 | Stop reason: SDUPTHER

## 2022-11-29 DIAGNOSIS — E11.9 TYPE 2 DIABETES MELLITUS WITHOUT COMPLICATION, WITHOUT LONG-TERM CURRENT USE OF INSULIN: ICD-10-CM

## 2022-11-29 RX ORDER — TIRZEPATIDE 7.5 MG/.5ML
7.5 INJECTION, SOLUTION SUBCUTANEOUS
Qty: 4 PEN | Refills: 0 | Status: SHIPPED | OUTPATIENT
Start: 2022-11-29 | End: 2023-01-06

## 2022-11-30 DIAGNOSIS — E11.9 TYPE 2 DIABETES MELLITUS WITHOUT COMPLICATION, WITHOUT LONG-TERM CURRENT USE OF INSULIN: ICD-10-CM

## 2022-11-30 RX ORDER — TIRZEPATIDE 10 MG/.5ML
10 INJECTION, SOLUTION SUBCUTANEOUS
Qty: 4 PEN | Refills: 11 | OUTPATIENT
Start: 2022-11-30

## 2022-11-30 RX ORDER — TIRZEPATIDE 7.5 MG/.5ML
7.5 INJECTION, SOLUTION SUBCUTANEOUS
Qty: 4 PEN | Refills: 0 | OUTPATIENT
Start: 2022-11-30

## 2022-12-01 RX ORDER — TIRZEPATIDE 5 MG/.5ML
INJECTION, SOLUTION SUBCUTANEOUS
Qty: 8 PEN | Refills: 11 | Status: SHIPPED | OUTPATIENT
Start: 2022-12-01 | End: 2023-01-06

## 2022-12-29 DIAGNOSIS — E11.9 TYPE 2 DIABETES MELLITUS WITHOUT COMPLICATION, WITHOUT LONG-TERM CURRENT USE OF INSULIN: ICD-10-CM

## 2022-12-29 RX ORDER — TIRZEPATIDE 5 MG/.5ML
INJECTION, SOLUTION SUBCUTANEOUS
Qty: 8 PEN | Refills: 11 | OUTPATIENT
Start: 2022-12-29

## 2022-12-29 NOTE — TELEPHONE ENCOUNTER
Can you send the Rx. for tirzepatide (MOUNJARO) 5 mg/0.5 mL PnIj to [facility] & they will submit the prior authorization. Once approved the pharmacy will mail the medication to the patient & if denied they will process the appeal.

## 2023-01-04 ENCOUNTER — LAB VISIT (OUTPATIENT)
Dept: LAB | Facility: CLINIC | Age: 53
End: 2023-01-04
Payer: OTHER GOVERNMENT

## 2023-01-04 DIAGNOSIS — Z12.31 OTHER SCREENING MAMMOGRAM: ICD-10-CM

## 2023-01-04 DIAGNOSIS — E11.9 TYPE 2 DIABETES MELLITUS WITHOUT COMPLICATION, WITHOUT LONG-TERM CURRENT USE OF INSULIN: ICD-10-CM

## 2023-01-04 LAB
T4 FREE SERPL-MCNC: 0.94 NG/DL (ref 0.71–1.51)
TSH SERPL DL<=0.005 MIU/L-ACNC: 2.12 UIU/ML (ref 0.4–4)

## 2023-01-04 PROCEDURE — 84439 ASSAY OF FREE THYROXINE: CPT | Performed by: PHYSICIAN ASSISTANT

## 2023-01-04 PROCEDURE — 84443 ASSAY THYROID STIM HORMONE: CPT | Performed by: PHYSICIAN ASSISTANT

## 2023-01-04 PROCEDURE — 36415 COLL VENOUS BLD VENIPUNCTURE: CPT | Mod: ,,, | Performed by: STUDENT IN AN ORGANIZED HEALTH CARE EDUCATION/TRAINING PROGRAM

## 2023-01-04 PROCEDURE — 36415 PR COLLECTION VENOUS BLOOD,VENIPUNCTURE: ICD-10-PCS | Mod: ,,, | Performed by: STUDENT IN AN ORGANIZED HEALTH CARE EDUCATION/TRAINING PROGRAM

## 2023-01-06 ENCOUNTER — OFFICE VISIT (OUTPATIENT)
Dept: FAMILY MEDICINE | Facility: CLINIC | Age: 53
End: 2023-01-06
Payer: OTHER GOVERNMENT

## 2023-01-06 VITALS
DIASTOLIC BLOOD PRESSURE: 90 MMHG | WEIGHT: 272.5 LBS | BODY MASS INDEX: 40.36 KG/M2 | HEART RATE: 87 BPM | HEIGHT: 69 IN | SYSTOLIC BLOOD PRESSURE: 136 MMHG | OXYGEN SATURATION: 99 %

## 2023-01-06 DIAGNOSIS — K21.9 GASTROESOPHAGEAL REFLUX DISEASE WITHOUT ESOPHAGITIS: Primary | ICD-10-CM

## 2023-01-06 DIAGNOSIS — Z79.4 TYPE 2 DIABETES MELLITUS WITH HYPERGLYCEMIA, WITH LONG-TERM CURRENT USE OF INSULIN: ICD-10-CM

## 2023-01-06 DIAGNOSIS — E11.65 TYPE 2 DIABETES MELLITUS WITH HYPERGLYCEMIA, WITH LONG-TERM CURRENT USE OF INSULIN: ICD-10-CM

## 2023-01-06 DIAGNOSIS — Z82.49 FAMILY HISTORY OF HEART DISEASE: ICD-10-CM

## 2023-01-06 DIAGNOSIS — E78.2 MIXED HYPERLIPIDEMIA: ICD-10-CM

## 2023-01-06 DIAGNOSIS — Z12.31 SCREENING MAMMOGRAM FOR BREAST CANCER: ICD-10-CM

## 2023-01-06 PROCEDURE — 99999 PR PBB SHADOW E&M-EST. PATIENT-LVL V: ICD-10-PCS | Mod: PBBFAC,,, | Performed by: FAMILY MEDICINE

## 2023-01-06 PROCEDURE — 99215 OFFICE O/P EST HI 40 MIN: CPT | Mod: PBBFAC,PN | Performed by: FAMILY MEDICINE

## 2023-01-06 PROCEDURE — 99214 PR OFFICE/OUTPT VISIT, EST, LEVL IV, 30-39 MIN: ICD-10-PCS | Mod: S$PBB,,, | Performed by: FAMILY MEDICINE

## 2023-01-06 PROCEDURE — 99999 PR PBB SHADOW E&M-EST. PATIENT-LVL V: CPT | Mod: PBBFAC,,, | Performed by: FAMILY MEDICINE

## 2023-01-06 PROCEDURE — 99214 OFFICE O/P EST MOD 30 MIN: CPT | Mod: S$PBB,,, | Performed by: FAMILY MEDICINE

## 2023-01-06 RX ORDER — PANTOPRAZOLE SODIUM 40 MG/1
40 TABLET, DELAYED RELEASE ORAL DAILY
Qty: 90 TABLET | Refills: 3 | Status: SHIPPED | OUTPATIENT
Start: 2023-01-06 | End: 2023-11-29 | Stop reason: SDUPTHER

## 2023-01-06 RX ORDER — SEMAGLUTIDE 2.68 MG/ML
INJECTION, SOLUTION SUBCUTANEOUS
COMMUNITY
End: 2023-03-10 | Stop reason: SDUPTHER

## 2023-01-06 NOTE — PROGRESS NOTES
Subjective:       Patient ID: Patria Bennett is a 52 y.o. female.    Chief Complaint: Follow-up (3 mth f/u for HTN. ), Dizziness (2 episodes where she gets a dizzy/lightheaded feeling in her head. Pt states she fell both times when it happened. ), and Nasal Congestion (Pt states this has been going on x1 week. Pt denies any fever, cough, body aches. Pt feels like this is just all sinuses. )    Hypertension follow up.   Hypertension Medications      valsartan-hydrochlorothiazide (DIOVAN-HCT) 160-25 mg per tablet Take 1 tablet by mouth once daily.   BP Readings from Last 3 Encounters:  01/06/23 : (!) 136/90  11/18/22 : 126/82  10/07/22 : (!) 132/94    A week of respiratory symptoms without systemic symptoms    Due for mammogram         Review of Systems   Constitutional:  Negative for activity change, appetite change, fatigue and fever.   HENT:  Positive for nasal congestion.    Respiratory:  Negative for shortness of breath.    Gastrointestinal:  Negative for abdominal pain.   Integumentary:  Negative for rash.   Neurological:  Positive for vertigo.       Objective:      Physical Exam  Vitals and nursing note reviewed.   Constitutional:       General: She is not in acute distress.     Appearance: She is not ill-appearing.   Cardiovascular:      Rate and Rhythm: Normal rate and regular rhythm.      Heart sounds: No murmur heard.  Pulmonary:      Effort: Pulmonary effort is normal.      Breath sounds: Normal breath sounds. No wheezing.   Skin:     General: Skin is warm and dry.      Findings: No rash.   Neurological:      Mental Status: She is alert.   Psychiatric:         Mood and Affect: Mood normal.         Behavior: Behavior normal.       Assessment:       1. Gastroesophageal reflux disease without esophagitis    2. Type 2 diabetes mellitus with hyperglycemia, with long-term current use of insulin    3. Screening mammogram for breast cancer    4. Mixed hyperlipidemia    5. Family history of heart disease           Plan:       Problem List Items Addressed This Visit          Cardiac/Vascular    Hyperlipidemia    Relevant Orders    Ambulatory referral/consult to Cardiology       GI    Gastroesophageal reflux disease - Primary    Relevant Medications    pantoprazole (PROTONIX) 40 MG tablet     Other Visit Diagnoses       Type 2 diabetes mellitus with hyperglycemia, with long-term current use of insulin        Relevant Medications    semaglutide (OZEMPIC) 2 mg/dose (8 mg/3 mL) PnIj    Other Relevant Orders    Ambulatory referral/consult to Cardiology    Screening mammogram for breast cancer        Relevant Orders    Mammo Digital Screening Bilat    Family history of heart disease        Relevant Orders    Ambulatory referral/consult to Cardiology

## 2023-01-06 NOTE — PATIENT INSTRUCTIONS
Thank you for allowing me to participate in your care today. It is an honor to be a part of your healthcare team at [facility]. If you had labs ordered today, you will receive notification via [employer], phone call or mailed letter regarding your results within 7 days. If you have any questions or concerns regarding your visit today, please do not hesitate to contact us.  Sincerely,   Madonna Collins M.D.

## 2023-02-21 NOTE — PROGRESS NOTES
Subjective:    Patient ID:  Patria Bennett is a 52 y.o. female who presents for evaluation of   Chief Complaint   Patient presents with    Establish Care    Shortness of Breath     FATIGUE       HPI:    1/6/23  Chief Complaint: Follow-up (3 mth f/u for HTN. ), Dizziness (2 episodes where she gets a dizzy/lightheaded feeling in her head. Pt states she fell both times when it happened. ), and Nasal Congestion (Pt states this has been going on x1 week. Pt denies any fever, cough, body aches. Pt feels like this is just all sinuses. )     Hypertension follow up.   Hypertension Medications      valsartan-hydrochlorothiazide (DIOVAN-HCT) 160-25 mg per tablet Take 1 tablet by mouth once daily.   BP Readings from Last 3 Encounters:  01/06/23 : (!) 136/90  11/18/22 : 126/82  10/07/22 : (!) 132/94     A week of respiratory symptoms without systemic symptoms        Gastroesophageal reflux disease without esophagitis    2. Type 2 diabetes mellitus with hyperglycemia, with long-term current use of insulin    3. Screening mammogram for breast cancer    4. Mixed hyperlipidemia    5. Family history of heart disease       Plan:       Problem List Items Addressed This Visit                  Cardiac/Vascular     Hyperlipidemia     Relevant Orders     Ambulatory referral/consult to Cardiology          GI     Gastroesophageal reflux disease - Primary     Relevant Medications     pantoprazole (PROTONIX) 40 MG tablet      Other Visit Diagnoses         Type 2 diabetes mellitus with hyperglycemia, with long-term current use of insulin         Relevant Medications     semaglutide (OZEMPIC) 2 mg/dose (8 mg/3 mL) PnIj     Other Relevant Orders     Ambulatory referral/consult to Cardiology     Screening mammogram for breast cancer         Relevant Orders     Mammo Digital Screening Bilat     Family history of heart disease         Relevant Orders     Ambulatory referral/consult to Cardiology       2/22/23  She is here today to establish care  for the problems as stated above.  She is referred by ZULEIMA Mello [    [id number]]    A 2 episodes of syncope when getting up around November last year.  One time she was standing riding on the counter and wall within got blurry and then she fell backwards hit her bottom 1st and then her head..  She has rapid heartbeats and the time.  Second time happened when getting off of the sofa she felt herself falling and was able to grab on to a chair and break her fall.  He reports constant pain in both legs this is no matter activity.  She has constant aching.  She has sharp pains sometimes walking she has numbness.  He has a constant ache was then moves her legs night.  She wakes up in middle the night and occasional breathing leg sometimes hurt to touch., she has blood pressure monitor home but does not check her blood pressure.  He has been on Jardiance and Ozempic.  She did have her spells of syncope which she was off of the Jardiance.  Feels like she could fall again.  She does go to the gym 5 days a week and walks on a treadmill without symptoms.  EKG today reveals normal sinus rhythm can not rule out old inferior infarct possible old anterior infarct    Review of patient's allergies indicates:  No Known Allergies    Past Medical History:   Diagnosis Date    Acid reflux     Acute hypoxemic respiratory failure 3/24/2020    Depression     Diabetes mellitus, type 2     Encounter for blood transfusion     Hypertension     Iron deficiency     Neuropathy     Vitamin B 12 deficiency      Past Surgical History:   Procedure Laterality Date    COLONOSCOPY  02/01/2022    REDUCTION OF BOTH BREASTS      THYROIDECTOMY, PARTIAL Left 12/02/2021    Procedure: THYROIDECTOMY-SUBTOTAL;  Surgeon: Nishant William MD;  Location: [facility];  Service: ENT;  Laterality: Left;    TONSILLECTOMY       Social History     Tobacco Use    Smoking status: Never    Smokeless tobacco: Never   Substance Use Topics    Alcohol use: Yes     Comment:  social , not monthly    Drug use: Never     Family History   Problem Relation Age of Onset    COPD Mother     Diabetes Mother     Seizures Mother     Hypertension Mother     Heart attack Father         Review of Systems:   Constitution: Negative for diaphoresis and fever.   HEENT: Negative for nosebleeds.    Cardiovascular: Negative for chest pain       No dyspnea on exertion       No leg swelling        No palpitations  Respiratory: Negative for shortness of breath and wheezing.    Hematologic/Lymphatic: Negative for bleeding problem. Does not bruise/bleed easily.   Skin: Negative for color change and rash.   Musculoskeletal: Negative for falls and myalgias.   Gastrointestinal: Negative for hematemesis and hematochezia.   Genitourinary: Negative for hematuria.   Neurological: Negative for dizziness and light-headedness.   Psychiatric/Behavioral: Negative for altered mental status and memory loss.          Objective:        Vitals:    02/22/23 1421   BP: (!) 140/84   Pulse: 96       Lab Results   Component Value Date    WBC 5.14 05/17/2022    HGB 12.8 05/17/2022    HCT 42.6 05/17/2022     05/17/2022    CHOL 185 11/11/2022    TRIG 196 (H) 11/11/2022    HDL 35 (L) 11/11/2022    ALT 18 11/11/2022    AST 18 11/11/2022     11/11/2022    K 3.8 11/11/2022     11/11/2022    CREATININE 0.9 11/11/2022    BUN 16 11/11/2022    CO2 21 (L) 11/11/2022    TSH 2.125 01/04/2023    HGBA1C 6.5 (H) 11/11/2022        ECHOCARDIOGRAM RESULTS  No results found for this or any previous visit.        CURRENT/PREVIOUS VISIT EKG  Results for orders placed or performed in visit on 08/03/22   EKG 12-lead    Collection Time: 08/03/22 10:06 AM    Narrative    Test Reason : R00.2,    Vent. Rate : 081 BPM     Atrial Rate : 081 BPM     P-R Int : 180 ms          QRS Dur : 076 ms      QT Int : 386 ms       P-R-T Axes : 067 042 028 degrees     QTc Int : 448 ms    Normal sinus rhythm  Normal ECG  When compared with ECG of 22-MAR-2020  09:40,  Nonspecific T wave abnormality has replaced inverted T waves in Inferior  leads  Confirmed by Erasmo Akhtar MD ([id number]) on 8/3/2022 1:44:46 PM    Referred By: ALEJANDRA   SELF           Confirmed By:Erasmo Akhtar MD     No valid procedures specified.   No results found for this or any previous visit.      Physical Exam:  CONSTITUTIONAL: No fever, no chills  HEENT: Normocephalic, atraumatic,pupils reactive to light                 NECK:  No JVD no carotid bruit  CVS: S1S2+, RRR, no murmurs,   LUNGS: Clear  ABDOMEN: Soft, NT, BS+  EXTREMITIES: No cyanosis, edema  : No hillman catheter  NEURO: AAO X 3  PSY: Normal affect      Medication List with Changes/Refills   Current Medications    BLOOD SUGAR DIAGNOSTIC (BLOOD GLUCOSE TEST) STRP    Check 2x daily. Insurance preferred.    BLOOD-GLUCOSE METER KIT    Use as instructed. Insurance preferred.    CLONAZEPAM (KLONOPIN) 1 MG TABLET    Take 1 mg by mouth 2 (two) times daily as needed for Anxiety.    CRESTOR 20 MG TABLET    TAKE ONE TABLET BY MOUTH EVERY DAY    CYANOCOBALAMIN, VITAMIN B-12, (B-12 COMPLIANCE) 1,000 MCG/ML KIT    Inject 1,000 mcg as directed once a week.    DULOXETINE (CYMBALTA) 60 MG CAPSULE    Take 1 capsule (60 mg total) by mouth once daily.    EMPAGLIFLOZIN (JARDIANCE) 25 MG TABLET    Take 1 tablet (25 mg total) by mouth every morning.    ERGOCALCIFEROL (VITAMIN D2) 50,000 UNIT CAP    Take one capsule 3x weekly.    GABAPENTIN (NEURONTIN) 300 MG CAPSULE    Take two capsules nightly.    LEVOTHYROXINE (SYNTHROID) 100 MCG TABLET    Take 1 tablet (100 mcg total) by mouth before breakfast.    LINACLOTIDE (LINZESS) 290 MCG CAP CAPSULE    Take 1 capsule (290 mcg total) by mouth before breakfast.    MELOXICAM (MOBIC) 15 MG TABLET    Take 15 mg by mouth once daily.    METFORMIN (GLUCOPHAGE) 1000 MG TABLET    Take 1 tablet (1,000 mg total) by mouth 2 (two) times daily with meals.    PANTOPRAZOLE (PROTONIX) 40 MG TABLET    Take 1 tablet (40 mg total) by mouth once  daily.    SEMAGLUTIDE (OZEMPIC) 2 MG/DOSE (8 MG/3 ML) PNIJ    Inject into the skin every 7 days.    VALSARTAN-HYDROCHLOROTHIAZIDE (DIOVAN-HCT) 160-25 MG PER TABLET    Take 1 tablet by mouth once daily.             Assessment:       1. Essential hypertension    2. Syncope and collapse    3. Mixed hyperlipidemia    4. Family history of heart disease    5. Type 2 diabetes mellitus without complication, without long-term current use of insulin    6. Gastroesophageal reflux disease with esophagitis without hemorrhage    7. Morbid obesity    8. Type 2 diabetes mellitus with hyperglycemia, with long-term current use of insulin    9. Dizziness    10. Excessive sweating    11. Localized edema    12. Palpitations    13. Sleep disorder    14. Restless leg syndrome         Plan:     Problem List Items Addressed This Visit          Cardiac/Vascular    Essential hypertension - Primary    Hyperlipidemia       Endocrine    Type 2 diabetes mellitus without complication    Relevant Orders    Echo Saline Bubble? No    Cardiac event monitor    Nuclear Stress - Cardiology Interpreted    Morbid obesity       GI    Gastroesophageal reflux disease     Other Visit Diagnoses       Syncope and collapse        Relevant Orders    Echo Saline Bubble? No    Cardiac event monitor    Nuclear Stress - Cardiology Interpreted    CV Ultrasound Bilateral Doppler Carotid    Segmental Pressure Lower Extremity    Ambulatory referral/consult to Sleep Disorders    Family history of heart disease        Relevant Orders    CV Ultrasound Bilateral Doppler Carotid    Segmental Pressure Lower Extremity    Ambulatory referral/consult to Sleep Disorders    Type 2 diabetes mellitus with hyperglycemia, with long-term current use of insulin        Relevant Orders    Echo Saline Bubble? No    Cardiac event monitor    Nuclear Stress - Cardiology Interpreted    Dizziness        Relevant Orders    IN OFFICE EKG 12-LEAD (to [place])    Echo Saline Bubble? No    Cardiac event  monitor    Nuclear Stress - Cardiology Interpreted    Excessive sweating        Localized edema        Palpitations        Sleep disorder        Relevant Orders    CV Ultrasound Bilateral Doppler Carotid    Segmental Pressure Lower Extremity    Ambulatory referral/consult to Sleep Disorders    Restless leg syndrome            We did check for orthostatic hypotension which is likely  Will need to monitor the home blood pressure carefully outside of the office setting..  We need to have a sleep study and rule out restless leg syndrome versus neuropathy in the lower extremity secondary to diabetes..  Arterial survey of the legs and neck to vascular causes of syncope and leg pain.  Holter monitor because of tachycardias.  Routine stress test and echo because of the above-stated problems.        Follow up in about 2 months (around 4/22/2023).    The patients questions were answered, they verbalized understanding, and agreed with the treatment plan.     ANNAMARIE LYONS MD  [facility]

## 2023-02-22 ENCOUNTER — OFFICE VISIT (OUTPATIENT)
Dept: CARDIOLOGY | Facility: CLINIC | Age: 53
End: 2023-02-22
Payer: OTHER GOVERNMENT

## 2023-02-22 VITALS
BODY MASS INDEX: 40.27 KG/M2 | WEIGHT: 272.69 LBS | HEART RATE: 96 BPM | DIASTOLIC BLOOD PRESSURE: 84 MMHG | SYSTOLIC BLOOD PRESSURE: 140 MMHG | OXYGEN SATURATION: 95 %

## 2023-02-22 DIAGNOSIS — G25.81 RESTLESS LEG SYNDROME: ICD-10-CM

## 2023-02-22 DIAGNOSIS — E11.65 TYPE 2 DIABETES MELLITUS WITH HYPERGLYCEMIA, WITH LONG-TERM CURRENT USE OF INSULIN: ICD-10-CM

## 2023-02-22 DIAGNOSIS — R00.2 PALPITATIONS: ICD-10-CM

## 2023-02-22 DIAGNOSIS — E78.2 MIXED HYPERLIPIDEMIA: ICD-10-CM

## 2023-02-22 DIAGNOSIS — R61 EXCESSIVE SWEATING: ICD-10-CM

## 2023-02-22 DIAGNOSIS — G47.9 SLEEP DISORDER: ICD-10-CM

## 2023-02-22 DIAGNOSIS — E11.9 TYPE 2 DIABETES MELLITUS WITHOUT COMPLICATION, WITHOUT LONG-TERM CURRENT USE OF INSULIN: ICD-10-CM

## 2023-02-22 DIAGNOSIS — R55 SYNCOPE AND COLLAPSE: ICD-10-CM

## 2023-02-22 DIAGNOSIS — K21.00 GASTROESOPHAGEAL REFLUX DISEASE WITH ESOPHAGITIS WITHOUT HEMORRHAGE: ICD-10-CM

## 2023-02-22 DIAGNOSIS — R42 DIZZINESS: ICD-10-CM

## 2023-02-22 DIAGNOSIS — Z82.49 FAMILY HISTORY OF HEART DISEASE: ICD-10-CM

## 2023-02-22 DIAGNOSIS — E66.01 MORBID OBESITY: ICD-10-CM

## 2023-02-22 DIAGNOSIS — R60.0 LOCALIZED EDEMA: ICD-10-CM

## 2023-02-22 DIAGNOSIS — I10 ESSENTIAL HYPERTENSION: Primary | ICD-10-CM

## 2023-02-22 DIAGNOSIS — Z79.4 TYPE 2 DIABETES MELLITUS WITH HYPERGLYCEMIA, WITH LONG-TERM CURRENT USE OF INSULIN: ICD-10-CM

## 2023-02-22 PROCEDURE — 93010 EKG 12-LEAD: ICD-10-PCS | Mod: S$PBB,,, | Performed by: GENERAL PRACTICE

## 2023-02-22 PROCEDURE — 99215 OFFICE O/P EST HI 40 MIN: CPT | Mod: PBBFAC,PN | Performed by: GENERAL PRACTICE

## 2023-02-22 PROCEDURE — 93005 ELECTROCARDIOGRAM TRACING: CPT | Mod: PBBFAC,PN | Performed by: GENERAL PRACTICE

## 2023-02-22 PROCEDURE — 99202 PR OFFICE/OUTPT VISIT, NEW, LEVL II, 15-29 MIN: ICD-10-PCS | Mod: S$PBB,,, | Performed by: GENERAL PRACTICE

## 2023-02-22 PROCEDURE — 99202 OFFICE O/P NEW SF 15 MIN: CPT | Mod: S$PBB,,, | Performed by: GENERAL PRACTICE

## 2023-02-22 PROCEDURE — 93010 ELECTROCARDIOGRAM REPORT: CPT | Mod: S$PBB,,, | Performed by: GENERAL PRACTICE

## 2023-02-22 PROCEDURE — 99999 PR PBB SHADOW E&M-EST. PATIENT-LVL V: ICD-10-PCS | Mod: PBBFAC,,, | Performed by: GENERAL PRACTICE

## 2023-02-22 PROCEDURE — 99999 PR PBB SHADOW E&M-EST. PATIENT-LVL V: CPT | Mod: PBBFAC,,, | Performed by: GENERAL PRACTICE

## 2023-02-23 ENCOUNTER — HOSPITAL ENCOUNTER (OUTPATIENT)
Dept: RADIOLOGY | Facility: HOSPITAL | Age: 53
Discharge: HOME | End: 2023-02-23
Attending: FAMILY MEDICINE
Payer: OTHER GOVERNMENT

## 2023-02-23 DIAGNOSIS — Z12.31 ENCOUNTER FOR SCREENING MAMMOGRAM FOR MALIGNANT NEOPLASM OF BREAST: ICD-10-CM

## 2023-02-23 PROCEDURE — 77063 MAMMO DIGITAL SCREENING BILAT WITH TOMO: ICD-10-PCS | Mod: 26,,, | Performed by: RADIOLOGY

## 2023-02-23 PROCEDURE — 77067 MAMMO DIGITAL SCREENING BILAT WITH TOMO: ICD-10-PCS | Mod: 26,,, | Performed by: RADIOLOGY

## 2023-02-23 PROCEDURE — 77067 SCR MAMMO BI INCL CAD: CPT | Mod: TC

## 2023-02-23 PROCEDURE — 77067 SCR MAMMO BI INCL CAD: CPT | Mod: 26,,, | Performed by: RADIOLOGY

## 2023-02-23 PROCEDURE — 77063 BREAST TOMOSYNTHESIS BI: CPT | Mod: 26,,, | Performed by: RADIOLOGY

## 2023-03-01 ENCOUNTER — TELEPHONE (OUTPATIENT)
Dept: CARDIOLOGY | Facility: HOSPITAL | Age: 53
End: 2023-03-01

## 2023-03-01 NOTE — TELEPHONE ENCOUNTER
Patient advised, [facility]   Will need to register on the first floor at the main entrance.   Patient advised that arrival time is 8:45am.  Patient advised that she may be here about 2.5-3 hours, and may want to bring something to occupy their time, as there will be periods of waiting.    Patient advised, may take her medications prior to testing if you need to.   Advised if she needs to eat to take her medications, please keep it light, like toast and juice.    Patient advised to avoid all caffeine 12 hours prior to testing.  This includes decaf tea and coffee.    Will provide peanut butter crackers for a snack after stress test.  If patient would prefer something else, please bring a snack from home.    Wear comfortable clothing.   No lotions, oils, or powders to the upper chest area. May wear deodorant.    No metal jewelry, buttons, or zippers to the upper body.  Patient verbalizes understanding of instructions.

## 2023-03-02 ENCOUNTER — HOSPITAL ENCOUNTER (OUTPATIENT)
Dept: RADIOLOGY | Facility: HOSPITAL | Age: 53
Discharge: HOME | End: 2023-03-02
Attending: GENERAL PRACTICE
Payer: OTHER GOVERNMENT

## 2023-03-02 ENCOUNTER — HOSPITAL ENCOUNTER (OUTPATIENT)
Dept: CARDIOLOGY | Facility: HOSPITAL | Age: 53
Discharge: HOME | End: 2023-03-02
Attending: GENERAL PRACTICE
Payer: OTHER GOVERNMENT

## 2023-03-02 DIAGNOSIS — R55 SYNCOPE AND COLLAPSE: ICD-10-CM

## 2023-03-02 DIAGNOSIS — E11.65 TYPE 2 DIABETES MELLITUS WITH HYPERGLYCEMIA, WITH LONG-TERM CURRENT USE OF INSULIN: ICD-10-CM

## 2023-03-02 DIAGNOSIS — E11.9 TYPE 2 DIABETES MELLITUS WITHOUT COMPLICATION, WITHOUT LONG-TERM CURRENT USE OF INSULIN: ICD-10-CM

## 2023-03-02 DIAGNOSIS — Z79.4 TYPE 2 DIABETES MELLITUS WITH HYPERGLYCEMIA, WITH LONG-TERM CURRENT USE OF INSULIN: ICD-10-CM

## 2023-03-02 DIAGNOSIS — R42 DIZZINESS: ICD-10-CM

## 2023-03-02 PROCEDURE — 78452 HT MUSCLE IMAGE SPECT MULT: CPT | Mod: 26,,, | Performed by: GENERAL PRACTICE

## 2023-03-02 PROCEDURE — 78452 NUCLEAR STRESS - CARDIOLOGY INTERPRETED (CUPID ONLY): ICD-10-PCS | Mod: 26,,, | Performed by: GENERAL PRACTICE

## 2023-03-02 PROCEDURE — A9502 TC99M TETROFOSMIN: HCPCS

## 2023-03-02 PROCEDURE — 93016 CV STRESS TEST SUPVJ ONLY: CPT | Mod: ,,, | Performed by: GENERAL PRACTICE

## 2023-03-02 PROCEDURE — 93018 NUCLEAR STRESS - CARDIOLOGY INTERPRETED (CUPID ONLY): ICD-10-PCS | Mod: ,,, | Performed by: GENERAL PRACTICE

## 2023-03-02 PROCEDURE — 78452 HT MUSCLE IMAGE SPECT MULT: CPT

## 2023-03-02 PROCEDURE — 93016 NUCLEAR STRESS - CARDIOLOGY INTERPRETED (CUPID ONLY): ICD-10-PCS | Mod: ,,, | Performed by: GENERAL PRACTICE

## 2023-03-02 PROCEDURE — 93018 CV STRESS TEST I&R ONLY: CPT | Mod: ,,, | Performed by: GENERAL PRACTICE

## 2023-03-02 RX ORDER — REGADENOSON 0.08 MG/ML
0.4 INJECTION, SOLUTION INTRAVENOUS ONCE
Status: COMPLETED | OUTPATIENT
Start: 2023-03-02 | End: 2023-03-02

## 2023-03-02 RX ADMIN — REGADENOSON 0.4 MG: 0.08 INJECTION, SOLUTION INTRAVENOUS at 11:03

## 2023-03-03 ENCOUNTER — HOSPITAL ENCOUNTER (OUTPATIENT)
Dept: RADIOLOGY | Facility: HOSPITAL | Age: 53
Discharge: HOME | End: 2023-03-03
Attending: GENERAL PRACTICE
Payer: OTHER GOVERNMENT

## 2023-03-03 LAB
CV PHARM DOSE: 0.4 MG
CV STRESS BASE HR: 83 BPM
DIASTOLIC BLOOD PRESSURE: 80 MMHG
EJECTION FRACTION- HIGH: 65 %
END DIASTOLIC INDEX-HIGH: 153 ML/M2
END DIASTOLIC INDEX-LOW: 93 ML/M2
END SYSTOLIC INDEX-HIGH: 71 ML/M2
END SYSTOLIC INDEX-LOW: 31 ML/M2
NUC REST DIASTOLIC VOLUME INDEX: 70
NUC REST EJECTION FRACTION: 79
NUC REST SYSTOLIC VOLUME INDEX: 15
NUC STRESS DIASTOLIC VOLUME INDEX: 76
NUC STRESS EJECTION FRACTION: 62 %
NUC STRESS SYSTOLIC VOLUME INDEX: 29
OHS CV CPX 1 MINUTE RECOVERY HEART RATE: 118 BPM
OHS CV CPX 85 PERCENT MAX PREDICTED HEART RATE MALE: 135
OHS CV CPX MAX PREDICTED HEART RATE: 158
OHS CV CPX PATIENT IS FEMALE: 1
OHS CV CPX PATIENT IS MALE: 0
OHS CV CPX PEAK DIASTOLIC BLOOD PRESSURE: 78 MMHG
OHS CV CPX PEAK HEAR RATE: 118 BPM
OHS CV CPX PEAK RATE PRESSURE PRODUCT: NORMAL
OHS CV CPX PEAK SYSTOLIC BLOOD PRESSURE: 156 MMHG
OHS CV CPX PERCENT MAX PREDICTED HEART RATE ACHIEVED: 74
OHS CV CPX RATE PRESSURE PRODUCT PRESENTING: NORMAL
RETIRED EF AND QEF - SEE NOTES: 53 %
SYSTOLIC BLOOD PRESSURE: 122 MMHG

## 2023-03-07 ENCOUNTER — TELEPHONE (OUTPATIENT)
Dept: CARDIOLOGY | Facility: HOSPITAL | Age: 53
End: 2023-03-07

## 2023-03-08 ENCOUNTER — HOSPITAL ENCOUNTER (OUTPATIENT)
Dept: RADIOLOGY | Facility: HOSPITAL | Age: 53
Discharge: HOME | End: 2023-03-08
Attending: GENERAL PRACTICE
Payer: OTHER GOVERNMENT

## 2023-03-08 ENCOUNTER — CLINICAL SUPPORT (OUTPATIENT)
Dept: CARDIOLOGY | Facility: HOSPITAL | Age: 53
End: 2023-03-08
Attending: GENERAL PRACTICE
Payer: OTHER GOVERNMENT

## 2023-03-08 DIAGNOSIS — R61 EXCESSIVE SWEATING: ICD-10-CM

## 2023-03-08 DIAGNOSIS — R60.0 LOCALIZED EDEMA: ICD-10-CM

## 2023-03-08 DIAGNOSIS — R55 SYNCOPE AND COLLAPSE: ICD-10-CM

## 2023-03-08 DIAGNOSIS — I10 ESSENTIAL HYPERTENSION: ICD-10-CM

## 2023-03-08 DIAGNOSIS — G47.9 SLEEP DISORDER: ICD-10-CM

## 2023-03-08 DIAGNOSIS — Z82.49 FAMILY HISTORY OF HEART DISEASE: ICD-10-CM

## 2023-03-08 PROCEDURE — 93923 SEGMENTAL PRESSURE LOWER EXTREMITY: ICD-10-PCS | Mod: 26,,, | Performed by: INTERNAL MEDICINE

## 2023-03-08 PROCEDURE — 93923 UPR/LXTR ART STDY 3+ LVLS: CPT | Mod: 26,,, | Performed by: INTERNAL MEDICINE

## 2023-03-08 PROCEDURE — 93880 EXTRACRANIAL BILAT STUDY: CPT | Mod: TC

## 2023-03-08 PROCEDURE — 93923 UPR/LXTR ART STDY 3+ LVLS: CPT | Mod: 50

## 2023-03-10 RX ORDER — SEMAGLUTIDE 2.68 MG/ML
2 INJECTION, SOLUTION SUBCUTANEOUS
Qty: 3 EACH | Refills: 3 | Status: SHIPPED | OUTPATIENT
Start: 2023-03-10 | End: 2023-03-14

## 2023-03-13 ENCOUNTER — TELEPHONE (OUTPATIENT)
Dept: ENDOCRINOLOGY | Facility: CLINIC | Age: 53
End: 2023-03-13
Payer: OTHER GOVERNMENT

## 2023-03-13 NOTE — TELEPHONE ENCOUNTER
----- Message from Ashley Castaneda sent at 3/13/2023 10:23 AM CDT -----  Regarding: Needs call back  Contact: Patria  Type: Needs Medical Advice  Who Called:  Patria  Pharmacy name and phone #:        [facility] [place] - [address]  [address]  [place] [address]  Phone: [phone] Fax: [phone]        Best Call Back Number: [phone]    Additional Information: Pt needs call back regarding prescription Monzaro to be called into mail order

## 2023-03-14 LAB
LEFT ABI: 0.95
LEFT ARM BP: 145 MMHG
LEFT CALF BP: 185 MMHG
LEFT POSTERIOR TIBIAL: 147 MMHG
LEFT UPPER LEG BP: 253 MMHG
RIGHT ABI: 0.94
RIGHT ARM BP: 155 MMHG
RIGHT CALF BP: 173 MMHG
RIGHT POSTERIOR TIBIAL: 145 MMHG
RIGHT UPPER LEG BP: 274 MMHG

## 2023-03-28 NOTE — TELEPHONE ENCOUNTER
----- Message from Marissa Olmedo sent at 3/28/2023  3:16 PM CDT -----  Type: Needs Medical Advice  Who Called:  pt  Symptoms (please be specific):  Calling about Munjaro rx     Pharmacy name and phone #:     [facility] - [address]  [address]  [place] [address]  Phone: [phone] Fax: [phone]      Best Call Back Number: [phone]     Additional Information: Pt sts she left msg  on 3/27 but has not rec'd a call back about her Munjaro rx. The dosage needed to be changed from 7.5 to .5. Please call pt back to advise. Thank you.

## 2023-03-29 DIAGNOSIS — R55 SYNCOPE AND COLLAPSE: Primary | ICD-10-CM

## 2023-03-29 RX ORDER — METOPROLOL TARTRATE 25 MG/1
12.5 TABLET, FILM COATED ORAL 2 TIMES DAILY
Qty: 30 TABLET | Refills: 11 | Status: SHIPPED | OUTPATIENT
Start: 2023-03-29 | End: 2024-03-28

## 2023-04-18 ENCOUNTER — LAB VISIT (OUTPATIENT)
Dept: LAB | Facility: HOSPITAL | Age: 53
End: 2023-04-18
Payer: OTHER GOVERNMENT

## 2023-04-18 ENCOUNTER — OFFICE VISIT (OUTPATIENT)
Dept: CARDIOLOGY | Facility: CLINIC | Age: 53
End: 2023-04-18
Payer: OTHER GOVERNMENT

## 2023-04-18 VITALS
BODY MASS INDEX: 40.94 KG/M2 | SYSTOLIC BLOOD PRESSURE: 124 MMHG | HEART RATE: 75 BPM | DIASTOLIC BLOOD PRESSURE: 80 MMHG | OXYGEN SATURATION: 93 % | WEIGHT: 277.25 LBS

## 2023-04-18 DIAGNOSIS — E78.2 MIXED HYPERLIPIDEMIA: ICD-10-CM

## 2023-04-18 DIAGNOSIS — Z79.4 TYPE 2 DIABETES MELLITUS WITH HYPERGLYCEMIA, WITH LONG-TERM CURRENT USE OF INSULIN: ICD-10-CM

## 2023-04-18 DIAGNOSIS — G25.81 RESTLESS LEG SYNDROME: ICD-10-CM

## 2023-04-18 DIAGNOSIS — E11.9 TYPE 2 DIABETES MELLITUS WITHOUT COMPLICATION, WITH LONG-TERM CURRENT USE OF INSULIN: ICD-10-CM

## 2023-04-18 DIAGNOSIS — R42 DIZZINESS: ICD-10-CM

## 2023-04-18 DIAGNOSIS — R55 SYNCOPE AND COLLAPSE: ICD-10-CM

## 2023-04-18 DIAGNOSIS — I10 ESSENTIAL HYPERTENSION: Primary | ICD-10-CM

## 2023-04-18 DIAGNOSIS — G47.9 SLEEP DISORDER: ICD-10-CM

## 2023-04-18 DIAGNOSIS — E11.65 TYPE 2 DIABETES MELLITUS WITH HYPERGLYCEMIA, WITH LONG-TERM CURRENT USE OF INSULIN: ICD-10-CM

## 2023-04-18 DIAGNOSIS — R60.0 LOCALIZED EDEMA: ICD-10-CM

## 2023-04-18 DIAGNOSIS — Z82.49 FAMILY HISTORY OF HEART DISEASE: ICD-10-CM

## 2023-04-18 DIAGNOSIS — R00.2 PALPITATIONS: ICD-10-CM

## 2023-04-18 DIAGNOSIS — K21.00 GASTROESOPHAGEAL REFLUX DISEASE WITH ESOPHAGITIS WITHOUT HEMORRHAGE: ICD-10-CM

## 2023-04-18 DIAGNOSIS — Z79.4 TYPE 2 DIABETES MELLITUS WITHOUT COMPLICATION, WITH LONG-TERM CURRENT USE OF INSULIN: ICD-10-CM

## 2023-04-18 LAB
ANION GAP SERPL CALC-SCNC: 11 MMOL/L (ref 8–16)
BUN SERPL-MCNC: 25 MG/DL (ref 6–20)
CALCIUM SERPL-MCNC: 9.6 MG/DL (ref 8.7–10.5)
CHLORIDE SERPL-SCNC: 100 MMOL/L (ref 95–110)
CO2 SERPL-SCNC: 25 MMOL/L (ref 23–29)
CREAT SERPL-MCNC: 0.9 MG/DL (ref 0.5–1.4)
EST. GFR  (NO RACE VARIABLE): >60 ML/MIN/1.73 M^2
GLUCOSE SERPL-MCNC: 186 MG/DL (ref 70–110)
MAGNESIUM SERPL-MCNC: 1.8 MG/DL (ref 1.6–2.6)
POTASSIUM SERPL-SCNC: 3.9 MMOL/L (ref 3.5–5.1)
SODIUM SERPL-SCNC: 136 MMOL/L (ref 136–145)

## 2023-04-18 PROCEDURE — 36415 COLL VENOUS BLD VENIPUNCTURE: CPT

## 2023-04-18 PROCEDURE — 99213 PR OFFICE/OUTPT VISIT, EST, LEVL III, 20-29 MIN: ICD-10-PCS | Mod: S$PBB,,, | Performed by: GENERAL PRACTICE

## 2023-04-18 PROCEDURE — 99999 PR PBB SHADOW E&M-EST. PATIENT-LVL IV: CPT | Mod: PBBFAC,,, | Performed by: GENERAL PRACTICE

## 2023-04-18 PROCEDURE — 80048 BASIC METABOLIC PNL TOTAL CA: CPT

## 2023-04-18 PROCEDURE — 99999 PR PBB SHADOW E&M-EST. PATIENT-LVL IV: ICD-10-PCS | Mod: PBBFAC,,, | Performed by: GENERAL PRACTICE

## 2023-04-18 PROCEDURE — 99214 OFFICE O/P EST MOD 30 MIN: CPT | Mod: PBBFAC,PN | Performed by: GENERAL PRACTICE

## 2023-04-18 PROCEDURE — 83735 ASSAY OF MAGNESIUM: CPT

## 2023-04-18 PROCEDURE — 99213 OFFICE O/P EST LOW 20 MIN: CPT | Mod: S$PBB,,, | Performed by: GENERAL PRACTICE

## 2023-04-18 RX ORDER — ROSUVASTATIN CALCIUM 40 MG/1
40 TABLET, COATED ORAL DAILY
Qty: 90 TABLET | Refills: 3 | Status: SHIPPED | OUTPATIENT
Start: 2023-04-18 | End: 2024-03-22 | Stop reason: SDUPTHER

## 2023-04-18 NOTE — PROGRESS NOTES
Subjective:    Patient ID:  Patria Bennett is a 52 y.o. female who presents for follow-up of   Chief Complaint   Patient presents with    Results       HPI:     4/18/23    HERE TO FOLLOW-UP OF HER TEST RESULTS.  HE IS STILL COMPLAINS OF LEG PAINS DESPITE INCREASING HER GABAPENTIN  AT NIGHT.  HER YASEMIN IS ACCEPTABLE BIPHASIC FLOW BILATERALLY.  YASEMIN 0.94 ON THE RIGHT AND 0.95 ON THE LEFT SUGGESTIVE OF MILD PVD.  NUCLEAR STRESS TEST IS NEGATIVE FOR ISCHEMIA.  ZIO EVENT MONITOR REVEALED NO SIGNIFICANT LIFE-THREATENING ARRHYTHMIAS HE WAS MONITORED 11 DAYS 1 HOUR IN 2 DAYS WITH RUN OUT SECONDARY TO [employer]IFACT.  SHE HAD 25 TRIGGERED EVENTS RATE WAS SINUS RHYTHM BETWEEN 76  BEATS PER MINUTE.  SHE HAD 1 EPISODE IT IS 6 34 IN THE MORNING OF 7 BEATS OF VENTRICULAR TACHYCARDIA NONSUSTAINED.  SHE WAS PACED ON LOW-DOSE BETA-BLOCKER BECAUSE OF THE EVENTS SHOWING SINUS RHYTHM AND SINUS TACHYCARDIA AND THE 1 EPISODE 7 BEATS OF NONSUSTAINED VT.        2/22/23  She is here today to establish care for the problems as stated above.  She is referred by ZULEIMA Mello [    [id number]]     A 2 episodes of syncope when getting up around November last year.  One time she was standing riding on the counter and wall within got blurry and then she fell backwards hit her bottom 1st and then her head..  She has rapid heartbeats and the time.  Second time happened when getting off of the sofa she felt herself falling and was able to grab on to a chair and break her fall.  He reports constant pain in both legs this is no matter activity.  She has constant aching.  She has sharp pains sometimes walking she has numbness.  He has a constant ache was then moves her legs night.  She wakes up in middle the night and occasional breathing, leg sometimes hurt to touch., she has blood pressure monitor home but does not check her blood pressure.  He has been on Jardiance and Ozempic.  She did have her spells of syncope which she was off of the  Jardiance.  Feels like she could fall again.  She does go to the gym 5 days a week and walks on a treadmill without symptoms.  EKG today reveals normal sinus rhythm can not rule out old inferior infarct possible old anterior infarct         Gastroesophageal reflux disease without esophagitis    2. Type 2 diabetes mellitus with hyperglycemia, with long-term current use of insulin    3. Screening mammogram for breast cancer    4. Mixed hyperlipidemia    5. Family history of heart disease       Plan:       Problem List Items Addressed This Visit                  Cardiac/Vascular     Hyperlipidemia     Relevant Orders     Ambulatory referral/consult to Cardiology          GI     Gastroesophageal reflux disease - Primary     Relevant Medications     pantoprazole (PROTONIX) 40 MG tablet      Other Visit Diagnoses         Type 2 diabetes mellitus with hyperglycemia, with long-term current use of insulin         Relevant Medications     semaglutide (OZEMPIC) 2 mg/dose (8 mg/3 mL) PnIj     Other Relevant Orders     Ambulatory referral/consult to Cardiology     Screening mammogram for breast cancer         Relevant Orders     Mammo Digital Screening Bilat     Family history of heart disease         Relevant Orders     Ambulatory referral/consult to Cardiology     1/6/23  Chief Complaint: Follow-up (3 mth f/u for HTN. ), Dizziness (2 episodes where she gets a dizzy/lightheaded feeling in her head. Pt states she fell both times when it happened. ), and Nasal Congestion (Pt states this has been going on x1 week. Pt denies any fever, cough, body aches. Pt feels like this is just all sinuses. )     Hypertension follow up.   Hypertension Medications      valsartan-hydrochlorothiazide (DIOVAN-HCT) 160-25 mg per tablet Take 1 tablet by mouth once daily.   BP Readings from Last 3 Encounters:  01/06/23 : (!) 136/90  11/18/22 : 126/82  10/07/22 : (!) 132/94     A week of respiratory symptoms without systemic symptoms           Gastroesophageal reflux disease without esophagitis    2. Type 2 diabetes mellitus with hyperglycemia, with long-term current use of insulin    3. Screening mammogram for breast cancer    4. Mixed hyperlipidemia    5. Family history of heart disease       Plan:       Problem List Items Addressed This Visit                     Cardiac/Vascular     Hyperlipidemia     Relevant Orders     Ambulatory referral/consult to Cardiology           GI     Gastroesophageal reflux disease - Primary     Relevant Medications     pantoprazole (PROTONIX) 40 MG tablet      Other Visit Diagnoses         Type 2 diabetes mellitus with hyperglycemia, with long-term current use of insulin         Relevant Medications     semaglutide (OZEMPIC) 2 mg/dose (8 mg/3 mL) PnIj     Other Relevant Orders     Ambulatory referral/consult to Cardiology     Screening mammogram for breast cancer         Relevant Orders     Mammo Digital Screening Bilat     Family history of heart disease         Relevant Orders     Ambulatory referral/consult to Cardiology        Review of patient's allergies indicates:  No Known Allergies    Past Medical History:   Diagnosis Date    Acid reflux     Acute hypoxemic respiratory failure 3/24/2020    Depression     Diabetes mellitus, type 2     Encounter for blood transfusion     Hypertension     Iron deficiency     Neuropathy     Vitamin B 12 deficiency      Past Surgical History:   Procedure Laterality Date    COLONOSCOPY  02/01/2022    REDUCTION OF BOTH BREASTS      THYROIDECTOMY, PARTIAL Left 12/02/2021    Procedure: THYROIDECTOMY-SUBTOTAL;  Surgeon: Nishant William MD;  Location: [facility];  Service: ENT;  Laterality: Left;    TONSILLECTOMY       Social History     Tobacco Use    Smoking status: Never    Smokeless tobacco: Never   Substance Use Topics    Alcohol use: Yes     Comment: social , not monthly    Drug use: Never     Family History   Problem Relation Age of Onset    COPD Mother     Diabetes Mother     Seizures  Mother     Hypertension Mother     Heart attack Father     Breast cancer Paternal Grandmother         Review of Systems:   Constitution: Negative for diaphoresis and fever.   HEENT: Negative for nosebleeds.    Cardiovascular: Negative for chest pain       No dyspnea on exertion       No leg swelling        No palpitations  Respiratory: Negative for shortness of breath and wheezing.    Hematologic/Lymphatic: Negative for bleeding problem. Does not bruise/bleed easily.   Skin: Negative for color change and rash.   Musculoskeletal: Negative for falls and myalgias.   Gastrointestinal: Negative for hematemesis and hematochezia.   Genitourinary: Negative for hematuria.   Neurological: Negative for dizziness and light-headedness.   Psychiatric/Behavioral: Negative for altered mental status and memory loss.          Objective:        Vitals:    04/18/23 1325   BP: 124/80   Pulse: 75       Lab Results   Component Value Date    WBC 5.14 05/17/2022    HGB 12.8 05/17/2022    HCT 42.6 05/17/2022     05/17/2022    CHOL 185 11/11/2022    TRIG 196 (H) 11/11/2022    HDL 35 (L) 11/11/2022    ALT 18 11/11/2022    AST 18 11/11/2022     04/18/2023    K 3.9 04/18/2023     04/18/2023    CREATININE 0.9 04/18/2023    BUN 25 (H) 04/18/2023    CO2 25 04/18/2023    TSH 2.125 01/04/2023    HGBA1C 6.5 (H) 11/11/2022        ECHOCARDIOGRAM RESULTS  No results found for this or any previous visit.        CURRENT/PREVIOUS VISIT EKG  Results for orders placed or performed in visit on 02/22/23   IN OFFICE EKG 12-LEAD (to [facility])    Collection Time: 02/22/23  2:27 PM    Narrative    Test Reason : R42,    Vent. Rate : 092 BPM     Atrial Rate : 092 BPM     P-R Int : 170 ms          QRS Dur : 088 ms      QT Int : 366 ms       P-R-T Axes : 039 040 003 degrees     QTc Int : 452 ms    Normal sinus rhythm  Cannot rule out Inferior infarct ,age undetermined  Possible Anterior infarct ,age undetermined  Abnormal ECG  When compared with ECG of  03-AUG-2022 10:06,  Inverted T waves have replaced nonspecific T wave abnormality in Inferior  leads  Confirmed by Loraine POMPA, Arnaud SILVERIO ([id number]) on 2/28/2023 10:02:43 PM    Referred By: ZULEIMA HOWARD           Confirmed By:Arnaud Baron MD     No valid procedures specified.   Results for orders placed during the hospital encounter of 03/02/23    Nuclear Stress - Cardiology Interpreted    Interpretation Summary    Normal myocardial perfusion scan. There is no evidence of myocardial ischemia or infarction.    There is a trivial intensity perfusion abnormality in the  wall of the left ventricle, secondary to breast attenuation.    The gated perfusion images showed an ejection fraction of 79% at rest. The gated perfusion images showed an ejection fraction of 62% post stress. Normal ejection fraction is greater than 53%.    There is normal wall motion at rest and post stress.    The ECG portion of the study is negative for ischemia.    The patient reported no chest pain during the stress test.    There were no arrhythmias during stress.      Physical Exam:  CONSTITUTIONAL: No fever, no chills  HEENT: Normocephalic, atraumatic,pupils reactive to light                 NECK:  No JVD no carotid bruit  CVS: S1S2+, RRR, no murmurs,   LUNGS: Clear  ABDOMEN: Soft, NT, BS+  EXTREMITIES: No cyanosis, edema  : No hillman catheter  NEURO: AAO X 3  PSY: Normal affect      Medication List with Changes/Refills   Current Medications    BLOOD SUGAR DIAGNOSTIC (BLOOD GLUCOSE TEST) STRP    Check 2x daily. Insurance preferred.    BLOOD-GLUCOSE METER KIT    Use as instructed. Insurance preferred.    CLONAZEPAM (KLONOPIN) 1 MG TABLET    Take 1 mg by mouth 2 (two) times daily as needed for Anxiety.    CYANOCOBALAMIN, VITAMIN B-12, (B-12 COMPLIANCE) 1,000 MCG/ML KIT    Inject 1,000 mcg as directed once a week.    DULOXETINE (CYMBALTA) 60 MG CAPSULE    Take 1 capsule (60 mg total) by mouth once daily.    EMPAGLIFLOZIN (JARDIANCE) 25 MG TABLET     Take 1 tablet (25 mg total) by mouth every morning.    ERGOCALCIFEROL (VITAMIN D2) 50,000 UNIT CAP    Take one capsule 3x weekly.    GABAPENTIN (NEURONTIN) 300 MG CAPSULE    Take two capsules nightly.    LEVOTHYROXINE (SYNTHROID) 100 MCG TABLET    Take 1 tablet (100 mcg total) by mouth before breakfast.    LINACLOTIDE (LINZESS) 290 MCG CAP CAPSULE    Take 1 capsule (290 mcg total) by mouth before breakfast.    MELOXICAM (MOBIC) 15 MG TABLET    Take 15 mg by mouth once daily.    METFORMIN (GLUCOPHAGE) 1000 MG TABLET    Take 1 tablet (1,000 mg total) by mouth 2 (two) times daily with meals.    METOPROLOL TARTRATE (LOPRESSOR) 25 MG TABLET    Take 0.5 tablets (12.5 mg total) by mouth 2 (two) times daily.    PANTOPRAZOLE (PROTONIX) 40 MG TABLET    Take 1 tablet (40 mg total) by mouth once daily.    TIRZEPATIDE 5 MG/0.5 ML PNIJ    Inject 5 mg into the skin every 7 days.    VALSARTAN-HYDROCHLOROTHIAZIDE (DIOVAN-HCT) 160-25 MG PER TABLET    Take 1 tablet by mouth once daily.   Changed and/or Refilled Medications    Modified Medication Previous Medication    ROSUVASTATIN (CRESTOR) 40 MG TAB CRESTOR 20 mg tablet       Take 1 tablet (40 mg total) by mouth once daily.    TAKE ONE TABLET BY MOUTH EVERY DAY             Assessment:       1. Essential hypertension    2. Syncope and collapse    3. Type 2 diabetes mellitus with hyperglycemia, with long-term current use of insulin    4. Mixed hyperlipidemia    5. Gastroesophageal reflux disease with esophagitis without hemorrhage    6. Dizziness    7. Localized edema    8. Palpitations    9. Sleep disorder    10. Restless leg syndrome    11. Family history of heart disease    12. Type 2 diabetes mellitus without complication, with long-term current use of insulin         Plan:     Problem List Items Addressed This Visit          Cardiac/Vascular    Essential hypertension - Primary    Hyperlipidemia       Endocrine    Type 2 diabetes mellitus without complication    Relevant  Medications    rosuvastatin (CRESTOR) 40 MG Tab       GI    Gastroesophageal reflux disease     Other Visit Diagnoses       Syncope and collapse        Type 2 diabetes mellitus with hyperglycemia, with long-term current use of insulin        Dizziness        Localized edema        Palpitations        Sleep disorder        Restless leg syndrome        Family history of heart disease              CONTINUE THE METOPROLOL FOR NOW TO SEE IF IT HELPS PREVENT HER PALPITATIONS ESPECIALLY FROM SINUS TACHYCARDIA.  ONE EPISODE OF NONSUSTAINED VT IS NOT CLINICALLY SIGNIFICANT BUT SHOULD BE HELPED BY THE BETA-BLOCKER.  SHE MIGHT NEED A REPEAT SLEEP STUDY TO RULE OUT SLEEP APNEA.    SHE WILL FOLLOW-UP WITH DR. HOWARD REGARDING THE DIABETIC NEUROPATHY IN HER LOWER EXTREMITIES.    HER LDL CHOLESTEROL IS ABOVE GOAL AND RECOMMEND TO INCREASE THE CRESTOR TO 40 MG DAILY TO KEEP HER GOAL LDL 50-70 LONG-TERM.    Follow up in about 1 year (around 4/18/2024).  PRN PRN    The patients questions were answered, they verbalized understanding, and agreed with the treatment plan.     ANNAMARIE LYONS MD  [facility]

## 2023-05-11 ENCOUNTER — LAB VISIT (OUTPATIENT)
Dept: LAB | Facility: CLINIC | Age: 53
End: 2023-05-11
Payer: OTHER GOVERNMENT

## 2023-05-11 DIAGNOSIS — E03.9 HYPOTHYROIDISM, UNSPECIFIED TYPE: ICD-10-CM

## 2023-05-11 DIAGNOSIS — E11.9 TYPE 2 DIABETES MELLITUS WITHOUT COMPLICATION, WITHOUT LONG-TERM CURRENT USE OF INSULIN: ICD-10-CM

## 2023-05-11 LAB
CHOLEST SERPL-MCNC: 233 MG/DL (ref 120–199)
CHOLEST/HDLC SERPL: 6.7 {RATIO} (ref 2–5)
ESTIMATED AVG GLUCOSE: 137 MG/DL (ref 68–131)
HBA1C MFR BLD: 6.4 % (ref 4–5.6)
HDLC SERPL-MCNC: 35 MG/DL (ref 40–75)
HDLC SERPL: 15 % (ref 20–50)
LDLC SERPL CALC-MCNC: 162.4 MG/DL (ref 63–159)
NONHDLC SERPL-MCNC: 198 MG/DL
T4 FREE SERPL-MCNC: 0.91 NG/DL (ref 0.71–1.51)
TRIGL SERPL-MCNC: 178 MG/DL (ref 30–150)
TSH SERPL DL<=0.005 MIU/L-ACNC: 5.54 UIU/ML (ref 0.4–4)

## 2023-05-11 PROCEDURE — 80061 LIPID PANEL: CPT | Performed by: PHYSICIAN ASSISTANT

## 2023-05-11 PROCEDURE — 84439 ASSAY OF FREE THYROXINE: CPT | Performed by: PHYSICIAN ASSISTANT

## 2023-05-11 PROCEDURE — 83036 HEMOGLOBIN GLYCOSYLATED A1C: CPT | Performed by: PHYSICIAN ASSISTANT

## 2023-05-11 PROCEDURE — 84443 ASSAY THYROID STIM HORMONE: CPT | Performed by: PHYSICIAN ASSISTANT

## 2023-05-22 ENCOUNTER — OFFICE VISIT (OUTPATIENT)
Dept: FAMILY MEDICINE | Facility: CLINIC | Age: 53
End: 2023-05-22
Payer: OTHER GOVERNMENT

## 2023-05-22 VITALS
SYSTOLIC BLOOD PRESSURE: 134 MMHG | RESPIRATION RATE: 18 BRPM | HEART RATE: 85 BPM | OXYGEN SATURATION: 98 % | WEIGHT: 272.31 LBS | BODY MASS INDEX: 40.33 KG/M2 | DIASTOLIC BLOOD PRESSURE: 88 MMHG | HEIGHT: 69 IN

## 2023-05-22 DIAGNOSIS — M79.641 PAIN IN BOTH HANDS: ICD-10-CM

## 2023-05-22 DIAGNOSIS — M25.511 CHRONIC PAIN OF BOTH SHOULDERS: Primary | ICD-10-CM

## 2023-05-22 DIAGNOSIS — H65.91 OME (OTITIS MEDIA WITH EFFUSION), RIGHT: ICD-10-CM

## 2023-05-22 DIAGNOSIS — B37.31 YEAST VAGINITIS: ICD-10-CM

## 2023-05-22 DIAGNOSIS — G89.29 CHRONIC PAIN OF BOTH SHOULDERS: Primary | ICD-10-CM

## 2023-05-22 DIAGNOSIS — M79.642 PAIN IN BOTH HANDS: ICD-10-CM

## 2023-05-22 DIAGNOSIS — M25.512 CHRONIC PAIN OF BOTH SHOULDERS: Primary | ICD-10-CM

## 2023-05-22 PROCEDURE — 99999 PR PBB SHADOW E&M-EST. PATIENT-LVL V: CPT | Mod: PBBFAC,,, | Performed by: FAMILY MEDICINE

## 2023-05-22 PROCEDURE — 99999 PR PBB SHADOW E&M-EST. PATIENT-LVL V: ICD-10-PCS | Mod: PBBFAC,,, | Performed by: FAMILY MEDICINE

## 2023-05-22 PROCEDURE — 99215 OFFICE O/P EST HI 40 MIN: CPT | Mod: PBBFAC,PN | Performed by: FAMILY MEDICINE

## 2023-05-22 PROCEDURE — 99214 PR OFFICE/OUTPT VISIT, EST, LEVL IV, 30-39 MIN: ICD-10-PCS | Mod: S$PBB,,, | Performed by: FAMILY MEDICINE

## 2023-05-22 PROCEDURE — 99214 OFFICE O/P EST MOD 30 MIN: CPT | Mod: S$PBB,,, | Performed by: FAMILY MEDICINE

## 2023-05-22 RX ORDER — CYCLOBENZAPRINE HCL 10 MG
10 TABLET ORAL 2 TIMES DAILY PRN
Qty: 180 TABLET | Refills: 1 | Status: SHIPPED | OUTPATIENT
Start: 2023-05-22

## 2023-05-22 RX ORDER — AMOXICILLIN AND CLAVULANATE POTASSIUM 875; 125 MG/1; MG/1
1 TABLET, FILM COATED ORAL EVERY 12 HOURS
Qty: 14 TABLET | Refills: 0 | Status: SHIPPED | OUTPATIENT
Start: 2023-05-22 | End: 2023-05-29

## 2023-05-22 RX ORDER — FLUCONAZOLE 150 MG/1
TABLET ORAL
Qty: 2 TABLET | Refills: 0 | Status: SHIPPED | OUTPATIENT
Start: 2023-05-22 | End: 2023-07-28

## 2023-05-22 NOTE — PROGRESS NOTES
Subjective:       Patient ID: Patria Bennett is a 53 y.o. female.    Chief Complaint: Follow-up and Hypertension    Hypertension follow up.   Hypertension Medications     metoprolol tartrate (LOPRESSOR) 25 MG tablet Take 0.5 tablets (12.5 mg   total) by mouth 2 (two) times daily.    valsartan-hydrochlorothiazide (DIOVAN-HCT) 160-25 mg per tablet Take 1   tablet by mouth once daily.    BP Readings from Last 3 Encounters:  05/22/23 : 134/88  04/18/23 : 124/80  02/22/23 : (!) 140/84    Blood pressure well controlled.     Complaining of constant and chronic pain diffusely. Shoulders, hands and legs that keeps her from sleeping.     Also complaining of right ear pain (really bilateral ear pain) - concerned she may be getting an ear infection.            Review of Systems   Constitutional:  Positive for activity change.   Musculoskeletal:  Positive for arthralgias and myalgias.       Objective:      Physical Exam  Vitals and nursing note reviewed.   Constitutional:       General: She is not in acute distress.     Appearance: She is obese. She is not ill-appearing.   HENT:      Right Ear: A middle ear effusion is present. Tympanic membrane is injected and erythematous.      Left Ear: A middle ear effusion is present. Tympanic membrane is not injected or erythematous.   Cardiovascular:      Rate and Rhythm: Normal rate and regular rhythm.      Heart sounds: No murmur heard.  Pulmonary:      Effort: Pulmonary effort is normal.      Breath sounds: Normal breath sounds. No wheezing.   Skin:     General: Skin is warm and dry.      Findings: No rash.   Neurological:      Mental Status: She is alert.   Psychiatric:         Mood and Affect: Mood normal.         Behavior: Behavior normal.       Assessment:       1. Chronic pain of both shoulders    2. Pain in both hands    3. Yeast vaginitis    4. OME (otitis media with effusion), right        Plan:       Problem List Items Addressed This Visit    None  Visit Diagnoses        Chronic pain of both shoulders    -  Primary    Relevant Orders    X-ray Shoulder 2 or More Views Right (Completed)    X-Ray Shoulder 2 or More Views Left (Completed)    Pain in both hands        Relevant Orders    X-Ray Hand 3 View Bilateral (Completed)    Yeast vaginitis        Relevant Medications    fluconazole (DIFLUCAN) 150 MG Tab    OME (otitis media with effusion), right        Relevant Medications    amoxicillin-clavulanate 875-125mg (AUGMENTIN) 875-125 mg per tablet                          Pushpa Charles  (RN)  2023 07:51:42

## 2023-05-23 ENCOUNTER — TELEPHONE (OUTPATIENT)
Dept: FAMILY MEDICINE | Facility: CLINIC | Age: 53
End: 2023-05-23
Payer: OTHER GOVERNMENT

## 2023-05-23 NOTE — TELEPHONE ENCOUNTER
----- Message from Madonna Collins MD sent at 5/22/2023  5:51 PM CDT -----  Please call and let Ms. Bennett know that there is no arthritis seen in either shoulder or bilateral hands.

## 2023-06-07 ENCOUNTER — OFFICE VISIT (OUTPATIENT)
Dept: PODIATRY | Facility: CLINIC | Age: 53
End: 2023-06-07
Payer: OTHER GOVERNMENT

## 2023-06-07 VITALS — OXYGEN SATURATION: 95 % | HEART RATE: 80 BPM

## 2023-06-07 DIAGNOSIS — M79.672 BILATERAL FOOT PAIN: ICD-10-CM

## 2023-06-07 DIAGNOSIS — M79.671 BILATERAL FOOT PAIN: ICD-10-CM

## 2023-06-07 DIAGNOSIS — E11.49 TYPE II DIABETES MELLITUS WITH NEUROLOGICAL MANIFESTATIONS: Primary | ICD-10-CM

## 2023-06-07 DIAGNOSIS — E11.42 DIABETIC POLYNEUROPATHY ASSOCIATED WITH TYPE 2 DIABETES MELLITUS: ICD-10-CM

## 2023-06-07 PROCEDURE — 99203 OFFICE O/P NEW LOW 30 MIN: CPT | Mod: ,,, | Performed by: PODIATRIST

## 2023-06-07 PROCEDURE — 99203 PR OFFICE/OUTPT VISIT, NEW, LEVL III, 30-44 MIN: ICD-10-PCS | Mod: ,,, | Performed by: PODIATRIST

## 2023-06-19 NOTE — PROGRESS NOTES
Subjective:     Patient ID: Patria Bennett is a 53 y.o. female.    Chief Complaint: Foot Pain (Patient c/o issues with ongoing pain in bilateral feet and ankles that she reports has been ongoing for over 1 year. Patient reports pain has continued to gradually worsen with time. Rates pain currently / on pain scale. )    Patria is a 53 y.o. female who presents to the podiatry clinic  with complaint of  bilateral foot pain. Onset of the symptoms was several years ago. Precipitating event: none known. Current symptoms include:  tingling and burning in balls of both feet . Aggravating factors:  prolonged use . Symptoms have progressed to a point and plateaued and gradually worsened. Patient has had prior foot problems. Evaluation to date: none. Treatment to date:  oral neurontin . Patients rates pain 5/10 on pain scale.    Review of Systems   Constitutional: Negative for chills and fever.   Cardiovascular:  Negative for chest pain and leg swelling.   Respiratory:  Negative for cough and shortness of breath.    Gastrointestinal:  Negative for diarrhea, nausea and vomiting.   Neurological:  Positive for numbness and paresthesias.      Objective:     Physical Exam  Vitals reviewed.   Constitutional:       General: She is not in acute distress.     Appearance: Normal appearance. She is not ill-appearing.   HENT:      Nose: Nose normal.   Cardiovascular:      Rate and Rhythm: Normal rate.   Pulmonary:      Effort: Pulmonary effort is normal. No respiratory distress.   Skin:     Capillary Refill: Capillary refill takes 2 to 3 seconds.   Neurological:      Mental Status: She is alert and oriented to person, place, and time.   Psychiatric:         Mood and Affect: Mood normal.         Behavior: Behavior normal.         Thought Content: Thought content normal.         Judgment: Judgment normal.     Dermatologic:  No open lesions noted bilateral foot, no rashes noted bilateral foot, no interdigital maceration noted bilateral foot    Vascular:  DP PT pulses palpable 2/4 bilateral foot, capillary fill time less 3 seconds to distal aspect of the digits bilateral foot   Musculoskeletal:  5/5 muscle strength noted bilateral foot, ankle joint range of motion is full without pain, minimal pain with direct palpation of the dorsal or plantar forefoot bilaterally   Neurologic: Protective and light touch sensation intact bilateral lower extremity, positive paresthesias reported, positive burning report    Assessment:      Encounter Diagnoses   Name Primary?    Type II diabetes mellitus with neurological manifestations Yes    Diabetic polyneuropathy associated with type 2 diabetes mellitus     Bilateral foot pain      Plan:     Patria was seen today for foot pain.    Diagnoses and all orders for this visit:    Type II diabetes mellitus with neurological manifestations    Diabetic polyneuropathy associated with type 2 diabetes mellitus    Bilateral foot pain      I counseled the patient on her conditions, their implications and medical management.        1. Patient was examined and evaluated.    2. Discussed with patient etiology of neuropathic pain symptoms.  Patient was advised to continue with oral gabapentin for improvement of the symptoms.  Patient will consider possible increase in frequency or dosage of gabapentin.  3. Patient was advised to continue with daily foot monitoring.  Patient will continue efforts at proper glycemic control, lowering hemoglobin A1c, adherence to diabetic medication regimen  4. Patient was advised to decrease the amount of barefoot walking.    5. Patient will follow-up p.r.n. for complaints

## 2023-06-28 ENCOUNTER — OFFICE VISIT (OUTPATIENT)
Dept: PODIATRY | Facility: CLINIC | Age: 53
End: 2023-06-28
Payer: OTHER GOVERNMENT

## 2023-06-28 VITALS — WEIGHT: 272 LBS | HEIGHT: 69 IN | BODY MASS INDEX: 40.29 KG/M2

## 2023-06-28 DIAGNOSIS — E11.49 TYPE II DIABETES MELLITUS WITH NEUROLOGICAL MANIFESTATIONS: ICD-10-CM

## 2023-06-28 DIAGNOSIS — M79.671 BILATERAL FOOT PAIN: ICD-10-CM

## 2023-06-28 DIAGNOSIS — M72.2 PLANTAR FASCIITIS: Primary | ICD-10-CM

## 2023-06-28 DIAGNOSIS — M79.672 BILATERAL FOOT PAIN: ICD-10-CM

## 2023-06-28 PROCEDURE — 99213 OFFICE O/P EST LOW 20 MIN: CPT | Mod: ,,, | Performed by: PODIATRIST

## 2023-06-28 PROCEDURE — 99213 PR OFFICE/OUTPT VISIT, EST, LEVL III, 20-29 MIN: ICD-10-PCS | Mod: ,,, | Performed by: PODIATRIST

## 2023-06-28 RX ORDER — METHYLPREDNISOLONE 4 MG/1
TABLET ORAL
Qty: 21 EACH | Refills: 0 | Status: SHIPPED | OUTPATIENT
Start: 2023-06-28 | End: 2023-07-28

## 2023-07-10 NOTE — PROGRESS NOTES
Subjective:     Patient ID: Patria Bennett is a 53 y.o. female.    Chief Complaint: Foot Pain and Diabetes Mellitus    Patria is a 53 y.o. female who presents to the clinic complaining of heel pain in both feet, especially with the first step in the morning. The pain is described as Throbbing, Grabbing, and Tight. The onset of the pain was gradual and has worsened slightly over the past several days. Patria rates the pain as 5/10. She denies a history of trauma. Prior treatments include none.    Review of Systems   Constitutional: Negative for chills and fever.   Cardiovascular:  Negative for chest pain and leg swelling.   Respiratory:  Negative for cough and shortness of breath.    Gastrointestinal:  Negative for diarrhea, nausea and vomiting.      Objective:     Physical Exam  Vitals reviewed.   Constitutional:       General: She is not in acute distress.     Appearance: Normal appearance. She is not ill-appearing.   HENT:      Nose: Nose normal.   Cardiovascular:      Rate and Rhythm: Normal rate.   Pulmonary:      Effort: Pulmonary effort is normal. No respiratory distress.   Skin:     Capillary Refill: Capillary refill takes 2 to 3 seconds.   Neurological:      Mental Status: She is alert and oriented to person, place, and time.   Psychiatric:         Mood and Affect: Mood normal.         Behavior: Behavior normal.         Thought Content: Thought content normal.         Judgment: Judgment normal.       Dermatologic:  No open lesions noted bilateral foot, no rashes noted bilateral foot, no interdigital maceration noted bilateral foot   Vascular:  DP PT pulses palpable 2/4 bilateral foot, capillary fill time less 3 seconds to distal aspect of the digits bilateral foot   Musculoskeletal:  5/5 muscle strength noted bilateral foot, ankle joint range of motion is full without pain, pain and tenderness with palpation of the bilateral calcaneal medial tubercle and medial band of the plantar fascia bilateral foot  particularly in the medial arch  Neurologic: Protective and light touch sensation intact bilateral lower extremity, positive paresthesias reported, positive burning report    Assessment:      Encounter Diagnoses   Name Primary?    Plantar fasciitis Yes    Bilateral foot pain     Type II diabetes mellitus with neurological manifestations      Plan:     Patria was seen today for foot pain and diabetes mellitus.    Diagnoses and all orders for this visit:    Plantar fasciitis  -     methylPREDNISolone (MEDROL DOSEPACK) 4 mg tablet; use as directed    Bilateral foot pain  -     methylPREDNISolone (MEDROL DOSEPACK) 4 mg tablet; use as directed    Type II diabetes mellitus with neurological manifestations      I counseled the patient on her conditions, their implications and medical management.        1. Patient was examined and evaluated.    2. Discussed with patient etiology of plantar fasciitis.  Conservative treatments were discussed in detail with the patient.  Stretching instructions were demonstrated for the patient.  Stretching instructions within printed and dispensed to the patient for home reference.  Discussed with patient the benefits of a local corticosteroid injection oral Medrol Dosepak for reduction of pain complaints.  Patient was advised to adjunct with OTC analgesics for pain relief  3. Patient was advised to continue with daily foot monitoring.  Patient will continue efforts at proper glycemic control, lowering hemoglobin A1c, and adherence to diabetic medication regimen  4. Patient will limit the use of flip-flops and decreased barefoot walking.  Patient will continue with comfortable shoe gear both inside and outside the home.    5. Patient will follow-up in 3 weeks or prn for complaints

## 2023-07-19 ENCOUNTER — OFFICE VISIT (OUTPATIENT)
Dept: PODIATRY | Facility: CLINIC | Age: 53
End: 2023-07-19
Payer: OTHER GOVERNMENT

## 2023-07-19 VITALS — HEART RATE: 85 BPM | OXYGEN SATURATION: 99 %

## 2023-07-19 DIAGNOSIS — M72.2 PLANTAR FASCIITIS: Primary | ICD-10-CM

## 2023-07-19 DIAGNOSIS — M79.671 BILATERAL FOOT PAIN: ICD-10-CM

## 2023-07-19 DIAGNOSIS — E11.49 TYPE II DIABETES MELLITUS WITH NEUROLOGICAL MANIFESTATIONS: ICD-10-CM

## 2023-07-19 DIAGNOSIS — M79.672 BILATERAL FOOT PAIN: ICD-10-CM

## 2023-07-19 PROCEDURE — 99213 PR OFFICE/OUTPT VISIT, EST, LEVL III, 20-29 MIN: ICD-10-PCS | Mod: 25,,, | Performed by: PODIATRIST

## 2023-07-19 PROCEDURE — 29540 STRAPPING ANKLE &/FOOT: CPT | Mod: 50,,, | Performed by: PODIATRIST

## 2023-07-19 PROCEDURE — 99213 OFFICE O/P EST LOW 20 MIN: CPT | Mod: 25,,, | Performed by: PODIATRIST

## 2023-07-19 PROCEDURE — 29540 PR STRAPPING; ANKLE &/OR FOOT: ICD-10-PCS | Mod: 50,,, | Performed by: PODIATRIST

## 2023-07-19 RX ORDER — IBUPROFEN 800 MG/1
800 TABLET ORAL 3 TIMES DAILY
Qty: 45 TABLET | Refills: 0 | Status: SHIPPED | OUTPATIENT
Start: 2023-07-19 | End: 2023-08-03

## 2023-07-28 NOTE — PROGRESS NOTES
Subjective:     Patient ID: Patria Bennett is a 53 y.o. female.    Chief Complaint: Foot Pain (Patient reports while she does still have pain present in bilateral feet she states that the pain has improved. She rates pain currently 4/10 on pain scale. )    Patria is a 53 y.o. female who presents to the clinic complaining of heel pain in both feet, especially with the first step in the morning. The pain is described as Throbbing, Grabbing, and Tight. The onset of the pain was gradual and has worsened slightly over the past several days. Patria rates the pain as 4/10. She denies a history of trauma. Prior treatments include oral Medrol Dosepak which was somewhat effective.    Review of Systems   Constitutional: Negative for chills and fever.   Cardiovascular:  Negative for chest pain and leg swelling.   Respiratory:  Negative for cough and shortness of breath.    Gastrointestinal:  Negative for diarrhea, nausea and vomiting.      Objective:     Physical Exam  Vitals reviewed.   Constitutional:       General: She is not in acute distress.     Appearance: Normal appearance. She is not ill-appearing.   HENT:      Head: Normocephalic.      Nose: Nose normal.   Cardiovascular:      Rate and Rhythm: Normal rate.   Pulmonary:      Effort: Pulmonary effort is normal. No respiratory distress.   Skin:     Capillary Refill: Capillary refill takes 2 to 3 seconds.   Neurological:      Mental Status: She is alert and oriented to person, place, and time.   Psychiatric:         Mood and Affect: Mood normal.         Behavior: Behavior normal.         Thought Content: Thought content normal.         Judgment: Judgment normal.     Dermatologic:  No open lesions noted bilateral foot, no rashes noted bilateral foot, no interdigital maceration noted bilateral foot   Vascular:  DP PT pulses palpable 2/4 bilateral foot, capillary fill time less 3 seconds to distal aspect of the digits bilateral foot   Musculoskeletal:  5/5 muscle strength  noted bilateral foot, ankle joint range of motion is full without pain, pain and tenderness with palpation of the bilateral calcaneal medial tubercle and medial band of the plantar fascia bilateral foot particularly in the medial arch  Neurologic: Protective and light touch sensation intact bilateral lower extremity, positive paresthesias reported, positive burning report    Assessment:      Encounter Diagnoses   Name Primary?    Plantar fasciitis Yes    Bilateral foot pain     Type II diabetes mellitus with neurological manifestations      Plan:     Patria was seen today for foot pain.    Diagnoses and all orders for this visit:    Plantar fasciitis  -     ibuprofen (ADVIL,MOTRIN) 800 MG tablet; Take 1 tablet (800 mg total) by mouth 3 (three) times daily. for 15 days    Bilateral foot pain  -     ibuprofen (ADVIL,MOTRIN) 800 MG tablet; Take 1 tablet (800 mg total) by mouth 3 (three) times daily. for 15 days    Type II diabetes mellitus with neurological manifestations      I counseled the patient on her conditions, their implications and medical management.        1. Patient was examined and evaluated.    2. Again Discussed with patient etiology of plantar fasciitis.  Conservative treatments were discussed in detail with the patient.  Patient was advised to continue with current stretching regimen.  Discussed with patient the benefits of a local corticosteroid injection oral Medrol Dosepak for reduction of pain complaints.  Patient was advised to adjunct with ibuprofen for pain relief  3. Patient was advised to continue with daily foot monitoring.  Patient will continue efforts at proper glycemic control, lowering hemoglobin A1c, and adherence to diabetic medication regimen  4. Patient will limit the use of flip-flops and decreased barefoot walking.  Patient will continue with comfortable shoe gear both inside and outside the home.    5. Patient had a low dye ankle and foot strapping/taping applied to bilateral  foot.  Patient will keep this dressing clean dry and intact for 1-3 days.  6. Patient will follow-up in 3 weeks or prn for complaints

## 2023-07-28 NOTE — PROCEDURES
"Procedures  STRAPPING/TAPING OF ANKLE AND FOOT:  LOW DYE BILATERAL FOOT    Patient had a low dye taping applied to the bilateral foot-1 in athletic tape applied from lateral 5th MPJ around the ankle to medial 1st MPJ x 2 with two plantar straps of 2 inch athletic tape applied from medial to lateral from just distal to the heel to the level of the midfoot. Then,  1 in athletic tape applied from lateral 5th MPJ around the ankle to medial 1st MPJ x 2 along with a Ohara's rest strap composed of 3 " tensoplast. Keep taping clean, dry, and intact for 1 - 3 days.    "

## 2023-08-07 ENCOUNTER — OFFICE VISIT (OUTPATIENT)
Dept: ENDOCRINOLOGY | Facility: CLINIC | Age: 53
End: 2023-08-07
Payer: OTHER GOVERNMENT

## 2023-08-07 VITALS
HEIGHT: 69 IN | BODY MASS INDEX: 41.47 KG/M2 | TEMPERATURE: 98 F | OXYGEN SATURATION: 98 % | SYSTOLIC BLOOD PRESSURE: 112 MMHG | WEIGHT: 280 LBS | DIASTOLIC BLOOD PRESSURE: 84 MMHG | HEART RATE: 89 BPM

## 2023-08-07 DIAGNOSIS — E78.5 HYPERLIPIDEMIA, UNSPECIFIED HYPERLIPIDEMIA TYPE: ICD-10-CM

## 2023-08-07 DIAGNOSIS — E66.9 OBESITY (BMI 30-39.9): ICD-10-CM

## 2023-08-07 DIAGNOSIS — Z79.4 TYPE 2 DIABETES MELLITUS WITH HYPERGLYCEMIA, WITH LONG-TERM CURRENT USE OF INSULIN: Primary | ICD-10-CM

## 2023-08-07 DIAGNOSIS — E04.1 NODULAR THYROID DISEASE: ICD-10-CM

## 2023-08-07 DIAGNOSIS — B37.31 VAGINAL YEAST INFECTION: ICD-10-CM

## 2023-08-07 DIAGNOSIS — Z79.4 TYPE 2 DIABETES MELLITUS WITH DIABETIC POLYNEUROPATHY, WITH LONG-TERM CURRENT USE OF INSULIN: ICD-10-CM

## 2023-08-07 DIAGNOSIS — E53.8 VITAMIN B12 DEFICIENCY: ICD-10-CM

## 2023-08-07 DIAGNOSIS — E55.9 HYPOVITAMINOSIS D: ICD-10-CM

## 2023-08-07 DIAGNOSIS — E11.65 TYPE 2 DIABETES MELLITUS WITH HYPERGLYCEMIA, WITH LONG-TERM CURRENT USE OF INSULIN: Primary | ICD-10-CM

## 2023-08-07 DIAGNOSIS — E11.42 TYPE 2 DIABETES MELLITUS WITH DIABETIC POLYNEUROPATHY, WITH LONG-TERM CURRENT USE OF INSULIN: ICD-10-CM

## 2023-08-07 DIAGNOSIS — I10 ESSENTIAL HYPERTENSION: ICD-10-CM

## 2023-08-07 DIAGNOSIS — Z78.0 POSTMENOPAUSAL: ICD-10-CM

## 2023-08-07 DIAGNOSIS — E61.1 IRON DEFICIENCY: ICD-10-CM

## 2023-08-07 PROCEDURE — 99999 PR PBB SHADOW E&M-EST. PATIENT-LVL IV: ICD-10-PCS | Mod: PBBFAC,,, | Performed by: PHYSICIAN ASSISTANT

## 2023-08-07 PROCEDURE — 99999 PR PBB SHADOW E&M-EST. PATIENT-LVL IV: CPT | Mod: PBBFAC,,, | Performed by: PHYSICIAN ASSISTANT

## 2023-08-07 PROCEDURE — 99214 PR OFFICE/OUTPT VISIT, EST, LEVL IV, 30-39 MIN: ICD-10-PCS | Mod: S$PBB,,, | Performed by: PHYSICIAN ASSISTANT

## 2023-08-07 PROCEDURE — 99214 OFFICE O/P EST MOD 30 MIN: CPT | Mod: S$PBB,,, | Performed by: PHYSICIAN ASSISTANT

## 2023-08-07 PROCEDURE — 99214 OFFICE O/P EST MOD 30 MIN: CPT | Mod: PBBFAC,PO | Performed by: PHYSICIAN ASSISTANT

## 2023-08-07 RX ORDER — FLUCONAZOLE 200 MG/1
200 TABLET ORAL
Qty: 2 TABLET | Refills: 1 | Status: SHIPPED | OUTPATIENT
Start: 2023-08-07 | End: 2024-03-04 | Stop reason: SDUPTHER

## 2023-08-07 RX ORDER — PIOGLITAZONEHYDROCHLORIDE 30 MG/1
30 TABLET ORAL DAILY
Qty: 90 TABLET | Refills: 3 | Status: SHIPPED | OUTPATIENT
Start: 2023-08-07 | End: 2024-08-06

## 2023-08-07 RX ORDER — LEVOTHYROXINE SODIUM 100 UG/1
100 TABLET ORAL
Qty: 30 TABLET | Refills: 11 | Status: SHIPPED | OUTPATIENT
Start: 2023-08-07 | End: 2024-03-04

## 2023-08-07 RX ORDER — TIRZEPATIDE 2.5 MG/.5ML
2.5 INJECTION, SOLUTION SUBCUTANEOUS
Qty: 12 PEN | Refills: 1 | Status: SHIPPED | OUTPATIENT
Start: 2023-08-07 | End: 2023-11-29 | Stop reason: SDUPTHER

## 2023-08-07 NOTE — PROGRESS NOTES
CC: This 53 y.o. female presents for management of Diabetes Mellitus  and chronic conditions pending review including HTN, HLP    HPI: was diagnosed with T2DM in >10 years on lab work.   Has never been hospitalized r/t DM.  Family hx of DM: parents, aunts, uncles  Fhx of thyroid disease: gf had thyroid cancer, aunts,   Denies missing doses of DM medication.   hypoglycemia at home: none  monitoring BG at home:  Fastins    Diet:   1 meal daily.  Ate soup yesterday.  Avoids sugary beverages.     Exercise: Walks 5 miles on the treadmill    CURRENT DM MEDS: Actos 30 mg qd, mounjaro 5 mg weekly, metformin 1000 qd, Jardiance 25 mg daily,      Standards of Care:  Eye exam: Not in a while due to insurance  Podiatry exam:  Follows w/ Dr. Moreno  DE: last year    DEXA scan:  wnl. FSH is elevated. LH is normal. She has been taking ergocalciferol 2x weekly for longer than one year.     MNG  S/p left thyroidectomy -benign  Thyroid u/s  shows a 2.2 cm mass in the right thyroid.   + occ sob and hoarseness. No dysphagia.   FNAs were benign of both nodules .     Hypothyroidism  LT4 100 mcg qd  + fatigue, constipation, sweating, anxiety  No diarrhea or tremors.    PMHx, PSHx: reviewed in epic.  Social Hx: no E/T use.    Wt Readings from Last 6 Encounters:   23 127 kg (280 lb)   23 123.4 kg (272 lb)   23 123.5 kg (272 lb 4.8 oz)   23 125.8 kg (277 lb 3.7 oz)   23 123.7 kg (272 lb 11.3 oz)   23 123.6 kg (272 lb 8 oz)      ROS:   Gen: Appetite good, + wt gain 9 lbs, denies fatigue and weakness.  Skin: Skin is intact and heals well, no rashes, no hair changes  Eyes: Denies visual disturbances  Resp: no SOB or AGUILAR, no cough  Cardiac: No palpitations, chest pain, no edema   GI:  No nausea or vomiting, diarrhea, constipation, or abdominal pain.  /GYN:+ night sweats, No nocturia, burning or pain.   MS/Neuro: + knee pain, Denies numbness/ tingling in BLE; Gait steady, speech  "clear  Psych: Denies drug/ETOH abuse, no hx of depression.  Other systems: negative.    /84 (BP Location: Left arm, Patient Position: Sitting, BP Method: Large (Manual))   Pulse 89   Temp 98.1 °F (36.7 °C) (Oral)   Ht 5' 9" (1.753 m)   Wt 127 kg (280 lb)   SpO2 98%   BMI 41.35 kg/m²      PE:  GENERAL: middle aged female,well developed, well nourished.  PSYCH: AAOx3, appropriate mood and affect, pleasant expression, conversant, appears relaxed, well groomed.   EYES: Conjunctiva, corneas clear  CHEST: Resp even and unlabored,   SKIN: Skin warm and dry no acanthosis nigracans.    Personally reviewed labs below:    No visits with results within 1 Month(s) from this visit.   Latest known visit with results is:   Lab Visit on 05/11/2023   Component Date Value Ref Range Status    TSH 05/11/2023 5.537 (H)  0.400 - 4.000 uIU/mL Final    Free T4 05/11/2023 0.91  0.71 - 1.51 ng/dL Final    Cholesterol 05/11/2023 233 (H)  120 - 199 mg/dL Final    Comment: The National Cholesterol Education Program (NCEP) has set the  following guidelines (reference ranges) for Cholesterol:  Optimal.....................<200 mg/dL  Borderline High.............200-239 mg/dL  High........................> or = 240 mg/dL      Triglycerides 05/11/2023 178 (H)  30 - 150 mg/dL Final    Comment: The National Cholesterol Education Program (NCEP) has set the  following guidelines (reference values) for triglycerides:  Normal......................<150 mg/dL  Borderline High.............150-199 mg/dL  High........................200-499 mg/dL      HDL 05/11/2023 35 (L)  40 - 75 mg/dL Final    Comment: The National Cholesterol Education Program (NCEP) has set the  following guidelines (reference values) for HDL Cholesterol:  Low...............<40 mg/dL  Optimal...........>60 mg/dL      LDL Cholesterol 05/11/2023 162.4 (H)  63.0 - 159.0 mg/dL Final    Comment: The National Cholesterol Education Program (NCEP) has set the  following guidelines " (reference values) for LDL Cholesterol:  Optimal.......................<130 mg/dL  Borderline High...............130-159 mg/dL  High..........................160-189 mg/dL  Very High.....................>190 mg/dL      HDL/Cholesterol Ratio 05/11/2023 15.0 (L)  20.0 - 50.0 % Final    Total Cholesterol/HDL Ratio 05/11/2023 6.7 (H)  2.0 - 5.0 Final    Non-HDL Cholesterol 05/11/2023 198  mg/dL Final    Comment: Risk category and Non-HDL cholesterol goals:  Coronary heart disease (CHD)or equivalent (10-year risk of CHD >20%):  Non-HDL cholesterol goal     <130 mg/dL  Two or more CHD risk factors and 10-year risk of CHD <= 20%:  Non-HDL cholesterol goal     <160 mg/dL  0 to 1 CHD risk factor:  Non-HDL cholesterol goal     <190 mg/dL      Hemoglobin A1C 05/11/2023 6.4 (H)  4.0 - 5.6 % Final    Comment: ADA Screening Guidelines:  5.7-6.4%  Consistent with prediabetes  >or=6.5%  Consistent with diabetes    High levels of fetal hemoglobin interfere with the HbA1C  assay. Heterozygous hemoglobin variants (HbS, HgC, etc)do  not significantly interfere with this assay.   However, presence of multiple variants may affect accuracy.      Estimated Avg Glucose 05/11/2023 137 (H)  68 - 131 mg/dL Final       ASSESSMENT and PLAN:    1. Type 2 diabetes mellitus with hyperglycemia, with long-term current use of insulin  pioglitazone (ACTOS) 30 MG tablet    T4, Free    TSH    Lipid Panel    Hemoglobin A1C    Comprehensive Metabolic Panel    T4, Free    TSH    SYNTHROID 100 mcg tablet      2. Type 2 diabetes mellitus with diabetic polyneuropathy, with long-term current use of insulin        3. Essential hypertension        4. Hyperlipidemia, unspecified hyperlipidemia type        5. Postmenopausal        6. Vitamin B12 deficiency        7. Obesity (BMI 30-39.9)        8. Hypovitaminosis D        9. Nodular thyroid disease  US Soft Tissue Head Neck Thyroid      10. Iron deficiency        11. Vaginal yeast infection  fluconazole (DIFLUCAN)  200 MG Tab         T2DM with hyperglycemia, neuropathy-  A1c is at goal.   Readings are elevated.   Change Ozempic to Mounjaro 7.5 mg weekly.   Increase the dose to 10 mg after one month. Continue Metformin and Jardiance.  Discussed A1c and BG goals.  Advised to have an eye exam.   Reviewed  hypoglycemia, s/s and appropriate tx.   Instructed to monitor BG and bring meter/ log to every clinic visit.   - takes ASA, ACEi, statin  Neuropathy- continue gabapentin 300 mg nightly.  HTN -stable, continue meds as previously prescribed and monitor. Check bp daily and send log in one week.    HLP -elevated  LDL , Could have increased from bs. Recheck next time. Continue crestor to 20 mg daily, LFTs WNL.   Obesity-Body mass index is 41.35 kg/m². Continue exercise and diet.  Hypovitaminosis d-low last time-continue ergocalciferol to 3x weekly. Mounjaro will help with wt loss.   Postmenopausal-dexa scan 2/24.  Vitamin B 12 [id number] injections.  Nodular thyroid disease- S/p left thyroidectomy. Repeat u/s.  Hypothyroidism-Elevated TSH. Change to synthroid 100 mcg daily. Recheck in six weeks.  Iron deficiency-stable-monitor-continue iron.  Yeast infection-start diflucan.    TFTs, lp in six weeks  Thyroid u/s  Follow-up: in 4 months with fasting lab prior and dexa

## 2023-08-07 NOTE — PATIENT INSTRUCTIONS
Diabetes Meds  Increase Mounjaro 7.5 mg weekly  Continue Actos 30 mg daily  Continue Jardiance 25 mg daily  Continue Metformin 1000 mg twice daily    Thyroid  Start Synthroid 100 mcg daily.

## 2023-08-16 ENCOUNTER — OFFICE VISIT (OUTPATIENT)
Dept: PODIATRY | Facility: CLINIC | Age: 53
End: 2023-08-16
Payer: OTHER GOVERNMENT

## 2023-08-16 VITALS — HEIGHT: 69 IN | BODY MASS INDEX: 41.47 KG/M2 | WEIGHT: 280 LBS

## 2023-08-16 DIAGNOSIS — M79.672 BILATERAL FOOT PAIN: ICD-10-CM

## 2023-08-16 DIAGNOSIS — M79.671 BILATERAL FOOT PAIN: ICD-10-CM

## 2023-08-16 DIAGNOSIS — D36.10 NEUROMA: Primary | ICD-10-CM

## 2023-08-16 PROCEDURE — 64455 NEUROMA INJECTION: ICD-10-PCS | Mod: 50,,, | Performed by: PODIATRIST

## 2023-08-16 PROCEDURE — 64455 NJX AA&/STRD PLTR COM DG NRV: CPT | Mod: 50,,, | Performed by: PODIATRIST

## 2023-08-16 PROCEDURE — 99213 OFFICE O/P EST LOW 20 MIN: CPT | Mod: 25,,, | Performed by: PODIATRIST

## 2023-08-16 PROCEDURE — 99213 PR OFFICE/OUTPT VISIT, EST, LEVL III, 20-29 MIN: ICD-10-PCS | Mod: 25,,, | Performed by: PODIATRIST

## 2023-08-16 RX ADMIN — DEXAMETHASONE SODIUM PHOSPHATE 4 MG: 4 INJECTION, SOLUTION INTRA-ARTICULAR; INTRALESIONAL; INTRAMUSCULAR; INTRAVENOUS; SOFT TISSUE at 01:08

## 2023-08-16 RX ADMIN — LIDOCAINE HYDROCHLORIDE 1 ML: 10 INJECTION INFILTRATION; PERINEURAL at 01:08

## 2023-08-23 ENCOUNTER — OFFICE VISIT (OUTPATIENT)
Dept: FAMILY MEDICINE | Facility: CLINIC | Age: 53
End: 2023-08-23
Payer: OTHER GOVERNMENT

## 2023-08-23 VITALS
BODY MASS INDEX: 41.03 KG/M2 | WEIGHT: 277 LBS | SYSTOLIC BLOOD PRESSURE: 104 MMHG | DIASTOLIC BLOOD PRESSURE: 65 MMHG | RESPIRATION RATE: 18 BRPM | HEIGHT: 69 IN

## 2023-08-23 DIAGNOSIS — E11.9 TYPE 2 DIABETES MELLITUS WITHOUT COMPLICATION, WITHOUT LONG-TERM CURRENT USE OF INSULIN: ICD-10-CM

## 2023-08-23 DIAGNOSIS — E53.8 LOW SERUM VITAMIN B12: Primary | ICD-10-CM

## 2023-08-23 DIAGNOSIS — E78.2 MIXED HYPERLIPIDEMIA: ICD-10-CM

## 2023-08-23 DIAGNOSIS — E55.9 VITAMIN D DEFICIENCY: ICD-10-CM

## 2023-08-23 DIAGNOSIS — I10 ESSENTIAL HYPERTENSION: ICD-10-CM

## 2023-08-23 DIAGNOSIS — M25.50 POLYARTHRALGIA: ICD-10-CM

## 2023-08-23 PROCEDURE — 99214 PR OFFICE/OUTPT VISIT, EST, LEVL IV, 30-39 MIN: ICD-10-PCS | Mod: S$PBB,,, | Performed by: FAMILY MEDICINE

## 2023-08-23 PROCEDURE — 99214 OFFICE O/P EST MOD 30 MIN: CPT | Mod: PBBFAC,PN | Performed by: FAMILY MEDICINE

## 2023-08-23 PROCEDURE — 99999 PR PBB SHADOW E&M-EST. PATIENT-LVL IV: CPT | Mod: PBBFAC,,, | Performed by: FAMILY MEDICINE

## 2023-08-23 PROCEDURE — 99999 PR PBB SHADOW E&M-EST. PATIENT-LVL IV: ICD-10-PCS | Mod: PBBFAC,,, | Performed by: FAMILY MEDICINE

## 2023-08-23 PROCEDURE — 99214 OFFICE O/P EST MOD 30 MIN: CPT | Mod: S$PBB,,, | Performed by: FAMILY MEDICINE

## 2023-08-23 NOTE — PROGRESS NOTES
Subjective:       Patient ID: Patria Bennett is a 53 y.o. female.    Chief Complaint: Follow-up (3 mo F/U)    Wt Readings from Last 6 Encounters:  08/23/23 : 121.1 kg (267 lb)  08/16/23 : 127 kg (280 lb)  08/07/23 : 127 kg (280 lb)  06/28/23 : 123.4 kg (272 lb)  05/22/23 : 123.5 kg (272 lb 4.8 oz)  04/18/23 : 125.8 kg (277 lb 3.7 oz)    BP Readings from Last 3 Encounters:  08/23/23 : 104/65  08/07/23 : 112/84  05/22/23 : 134/88    Lab Results       Component                Value               Date                       HGBA1C                   6.4 (H)             05/11/2023              She has been increased on her mounjaro.     Having aches and pains all over and feels like something is wrong or off. Requesting labs      Review of Systems   Constitutional:  Negative for activity change, appetite change, fatigue and fever.   Respiratory:  Negative for shortness of breath.    Gastrointestinal:  Negative for abdominal pain.   Integumentary:  Negative for rash.         Objective:      Physical Exam  Vitals and nursing note reviewed.   Constitutional:       General: She is not in acute distress.     Appearance: She is not ill-appearing.   Cardiovascular:      Rate and Rhythm: Normal rate and regular rhythm.      Heart sounds: No murmur heard.  Pulmonary:      Effort: Pulmonary effort is normal.      Breath sounds: Normal breath sounds. No wheezing.   Skin:     General: Skin is warm and dry.      Findings: No rash.   Neurological:      Mental Status: She is alert.   Psychiatric:         Mood and Affect: Mood normal.         Behavior: Behavior normal.         Assessment:       1. Low serum vitamin B12    2. Essential hypertension    3. Mixed hyperlipidemia    4. Type 2 diabetes mellitus without complication, without long-term current use of insulin    5. Polyarthralgia    6. Vitamin D deficiency        Plan:       Problem List Items Addressed This Visit          Cardiac/Vascular    Essential hypertension    Relevant  Orders    CBC Auto Differential    Hyperlipidemia     Stable. Well controlled. Continue current medications.               Endocrine    Type 2 diabetes mellitus without complication     Keep endo follow up          Other Visit Diagnoses       Low serum vitamin B12    -  Primary    Relevant Orders    Vitamin B12    Polyarthralgia        Relevant Orders    Uric acid    Sedimentation rate    Comprehensive Metabolic Panel    BRYANT Screen w/Reflex    Vitamin D deficiency        Relevant Orders    Vitamin D            Labs as above for complaints of arthralgias/myalgia

## 2023-08-25 ENCOUNTER — LAB VISIT (OUTPATIENT)
Dept: LAB | Facility: CLINIC | Age: 53
End: 2023-08-25
Payer: OTHER GOVERNMENT

## 2023-08-25 DIAGNOSIS — I10 ESSENTIAL HYPERTENSION: ICD-10-CM

## 2023-08-25 DIAGNOSIS — E55.9 VITAMIN D DEFICIENCY: ICD-10-CM

## 2023-08-25 DIAGNOSIS — E53.8 LOW SERUM VITAMIN B12: ICD-10-CM

## 2023-08-25 DIAGNOSIS — M25.50 POLYARTHRALGIA: ICD-10-CM

## 2023-08-25 LAB
25(OH)D3+25(OH)D2 SERPL-MCNC: 21 NG/ML (ref 30–96)
ALBUMIN SERPL BCP-MCNC: 4.1 G/DL (ref 3.5–5.2)
ALP SERPL-CCNC: 55 U/L (ref 55–135)
ALT SERPL W/O P-5'-P-CCNC: 22 U/L (ref 10–44)
ANION GAP SERPL CALC-SCNC: 11 MMOL/L (ref 8–16)
AST SERPL-CCNC: 21 U/L (ref 10–40)
BASOPHILS # BLD AUTO: 0.04 K/UL (ref 0–0.2)
BASOPHILS NFR BLD: 0.6 % (ref 0–1.9)
BILIRUB SERPL-MCNC: 0.5 MG/DL (ref 0.1–1)
BUN SERPL-MCNC: 14 MG/DL (ref 6–20)
CALCIUM SERPL-MCNC: 9.7 MG/DL (ref 8.7–10.5)
CHLORIDE SERPL-SCNC: 106 MMOL/L (ref 95–110)
CO2 SERPL-SCNC: 24 MMOL/L (ref 23–29)
CREAT SERPL-MCNC: 1 MG/DL (ref 0.5–1.4)
DIFFERENTIAL METHOD: ABNORMAL
EOSINOPHIL # BLD AUTO: 0.3 K/UL (ref 0–0.5)
EOSINOPHIL NFR BLD: 4.1 % (ref 0–8)
ERYTHROCYTE [DISTWIDTH] IN BLOOD BY AUTOMATED COUNT: 13.7 % (ref 11.5–14.5)
ERYTHROCYTE [SEDIMENTATION RATE] IN BLOOD BY WESTERGREN METHOD: 10 MM/HR (ref 0–20)
EST. GFR  (NO RACE VARIABLE): >60 ML/MIN/1.73 M^2
GLUCOSE SERPL-MCNC: 152 MG/DL (ref 70–110)
HCT VFR BLD AUTO: 44 % (ref 37–48.5)
HGB BLD-MCNC: 13.7 G/DL (ref 12–16)
IMM GRANULOCYTES # BLD AUTO: 0.01 K/UL (ref 0–0.04)
IMM GRANULOCYTES NFR BLD AUTO: 0.2 % (ref 0–0.5)
LYMPHOCYTES # BLD AUTO: 3 K/UL (ref 1–4.8)
LYMPHOCYTES NFR BLD: 45.8 % (ref 18–48)
MCH RBC QN AUTO: 26.4 PG (ref 27–31)
MCHC RBC AUTO-ENTMCNC: 31.1 G/DL (ref 32–36)
MCV RBC AUTO: 85 FL (ref 82–98)
MONOCYTES # BLD AUTO: 0.5 K/UL (ref 0.3–1)
MONOCYTES NFR BLD: 7.8 % (ref 4–15)
NEUTROPHILS # BLD AUTO: 2.7 K/UL (ref 1.8–7.7)
NEUTROPHILS NFR BLD: 41.5 % (ref 38–73)
NRBC BLD-RTO: 0 /100 WBC
PLATELET # BLD AUTO: 312 K/UL (ref 150–450)
PMV BLD AUTO: 10 FL (ref 9.2–12.9)
POTASSIUM SERPL-SCNC: 3.9 MMOL/L (ref 3.5–5.1)
PROT SERPL-MCNC: 7.7 G/DL (ref 6–8.4)
RBC # BLD AUTO: 5.19 M/UL (ref 4–5.4)
SODIUM SERPL-SCNC: 141 MMOL/L (ref 136–145)
URATE SERPL-MCNC: 5.4 MG/DL (ref 2.4–5.7)
VIT B12 SERPL-MCNC: 504 PG/ML (ref 210–950)
WBC # BLD AUTO: 6.55 K/UL (ref 3.9–12.7)

## 2023-08-25 PROCEDURE — 84550 ASSAY OF BLOOD/URIC ACID: CPT | Performed by: FAMILY MEDICINE

## 2023-08-25 PROCEDURE — 86235 NUCLEAR ANTIGEN ANTIBODY: CPT | Mod: 59 | Performed by: FAMILY MEDICINE

## 2023-08-25 PROCEDURE — 82607 VITAMIN B-12: CPT | Performed by: FAMILY MEDICINE

## 2023-08-25 PROCEDURE — 80053 COMPREHEN METABOLIC PANEL: CPT | Performed by: FAMILY MEDICINE

## 2023-08-25 PROCEDURE — 82306 VITAMIN D 25 HYDROXY: CPT | Performed by: FAMILY MEDICINE

## 2023-08-25 PROCEDURE — 86039 ANTINUCLEAR ANTIBODIES (ANA): CPT | Performed by: FAMILY MEDICINE

## 2023-08-25 PROCEDURE — 86038 ANTINUCLEAR ANTIBODIES: CPT | Performed by: FAMILY MEDICINE

## 2023-08-25 PROCEDURE — 85651 RBC SED RATE NONAUTOMATED: CPT | Performed by: FAMILY MEDICINE

## 2023-08-25 PROCEDURE — 85025 COMPLETE CBC W/AUTO DIFF WBC: CPT | Performed by: FAMILY MEDICINE

## 2023-08-27 RX ORDER — DEXAMETHASONE SODIUM PHOSPHATE 4 MG/ML
4 INJECTION, SOLUTION INTRA-ARTICULAR; INTRALESIONAL; INTRAMUSCULAR; INTRAVENOUS; SOFT TISSUE
Status: DISCONTINUED | OUTPATIENT
Start: 2023-08-16 | End: 2023-08-27 | Stop reason: HOSPADM

## 2023-08-27 RX ORDER — ERGOCALCIFEROL 1.25 MG/1
50000 CAPSULE ORAL
Qty: 12 CAPSULE | Refills: 0 | Status: SHIPPED | OUTPATIENT
Start: 2023-08-27 | End: 2023-11-29 | Stop reason: SDUPTHER

## 2023-08-27 RX ORDER — LIDOCAINE HYDROCHLORIDE 10 MG/ML
1 INJECTION INFILTRATION; PERINEURAL
Status: DISCONTINUED | OUTPATIENT
Start: 2023-08-16 | End: 2023-08-27 | Stop reason: HOSPADM

## 2023-08-27 NOTE — PROGRESS NOTES
Subjective:     Patient ID: Patria Bennett is a 53 y.o. female.    Chief Complaint: Foot Pain    Patria is a 53 y.o. female who presents to the podiatry clinic  with complaint of  bilateral foot pain. Onset of the symptoms was several weeks ago. Precipitating event: increased activity. Current symptoms include: ability to bear weight, but with some pain, worsening symptoms after a period of activity, and sharp, shooting pain between bilateral 2nd and 3rd digits . Aggravating factors: walking. Symptoms have gradually worsened. Patient has had prior foot problems. Evaluation to date: none. Treatment to date: rest. Patients rates pain 8/10 on pain scale.    Review of Systems   Constitutional: Negative for chills and fever.   Cardiovascular:  Negative for chest pain and leg swelling.   Respiratory:  Negative for cough and shortness of breath.    Gastrointestinal:  Negative for diarrhea, nausea and vomiting.   Neurological:  Positive for paresthesias.        Objective:     Physical Exam  Vitals reviewed.   Constitutional:       General: She is not in acute distress.     Appearance: Normal appearance. She is not ill-appearing.   HENT:      Head: Normocephalic.      Nose: Nose normal.   Cardiovascular:      Rate and Rhythm: Normal rate.   Pulmonary:      Effort: Pulmonary effort is normal. No respiratory distress.   Skin:     Capillary Refill: Capillary refill takes 2 to 3 seconds.   Neurological:      Mental Status: She is alert and oriented to person, place, and time.   Psychiatric:         Mood and Affect: Mood normal.         Behavior: Behavior normal.         Thought Content: Thought content normal.         Judgment: Judgment normal.     Neurologic: Protective and light touch sensation intact bilateral lower extremity, positive Brittany sign with palpation bilateral 2nd interspace, positive Tinel sign bilateral 2nd interspace,  Musculoskeletal:  5/5 muscle strength noted bilateral foot, ankle joint range of motion is  reduced without pain, pain and tenderness with palpation bilateral 2nd interspace   Dermatologic:  No open lesions noted bilateral foot, no rashes noted bilateral foot, no interdigital maceration noted bilateral foot   Vascular:  DP and PT pulses palpable 2/4 bilateral foot, capillary refill time less than 3 seconds to distal digits bilateral foot      Assessment:      Encounter Diagnoses   Name Primary?    Neuroma Yes    Bilateral foot pain      Plan:     Patria was seen today for foot pain.    Diagnoses and all orders for this visit:    Neuroma  -     Neuroma injection    Bilateral foot pain  -     Neuroma injection      I counseled the patient on her conditions, their implications and medical management.        1. Patient was examined and evaluated.    2. Patient advised continue with current stretching regimen for improvement of heel pain.  Patient was warned of potential recurrence over time.    3. Discussed with patient etiology of neuroma versus neuritis symptoms bilateral foot.  Discussed with patient possible benefits of local corticosteroid injection oral Medrol Dosepak for improvement of symptoms.  Patient had amount offloading pad placed beneath the bilateral 2nd interspace for reduction of direct pressure.    4. Patient was advised to decrease the amount of barefoot walking and limit the use of flip-flops.    Neuroma injection    Date/Time: 8/16/2023 1:00 PM    Performed by: Wade Moreno DPM  Authorized by: Wade Moreno DPM    Consent Done?:  Yes (Verbal)  Indications:  Pain  Location Left Foot:  Second Webspace  Location Right Foot:  Second Webspace  Needle size:  25 G  Approach:  Dorsal  Medications:  1 mL LIDOcaine HCL 10 mg/ml (1%) 10 mg/mL (1 %); 4 mg dexAMETHasone 4 mg/mL  Patient tolerance:  Patient tolerated the procedure well with no immediate complications      5. Patient will adjunct with OTC NSAIDs for pain relief  6. Patient will follow-up in 3 weeks to a month or p.r.n. for  complaints

## 2023-08-28 ENCOUNTER — TELEPHONE (OUTPATIENT)
Dept: FAMILY MEDICINE | Facility: CLINIC | Age: 53
End: 2023-08-28
Payer: OTHER GOVERNMENT

## 2023-08-28 LAB
ANA PATTERN 1: NORMAL
ANA SER QL IF: POSITIVE
ANA TITR SER IF: NORMAL {TITER}

## 2023-08-28 NOTE — TELEPHONE ENCOUNTER
----- Message from Madonna Collins MD sent at 2023  2:26 PM CDT -----  Please call and let Ms. Bennett know the following:   I have reviewed your labs and note the followin. Vitamin D is low.   2. Metabolic panel was significant only for elevated blood sugar  3. Uric acid and sedimentation rate are normal.   4. B12 level is sufficient.   No evidence of gout or inflammation but low vitamin D can occasionally cause aches and pains. I will send this to your pharmacy. -Dr. Collins

## 2023-08-29 ENCOUNTER — TELEPHONE (OUTPATIENT)
Dept: FAMILY MEDICINE | Facility: CLINIC | Age: 53
End: 2023-08-29
Payer: OTHER GOVERNMENT

## 2023-08-29 LAB
ANTI SM ANTIBODY: 0.1 RATIO (ref 0–0.99)
ANTI SM/RNP ANTIBODY: 0.1 RATIO (ref 0–0.99)
ANTI-SM INTERPRETATION: NEGATIVE
ANTI-SM/RNP INTERPRETATION: NEGATIVE
ANTI-SSA ANTIBODY: 0.06 RATIO (ref 0–0.99)
ANTI-SSA INTERPRETATION: NEGATIVE
ANTI-SSB ANTIBODY: 0.06 RATIO (ref 0–0.99)
ANTI-SSB INTERPRETATION: NEGATIVE
DSDNA AB SER-ACNC: NORMAL [IU]/ML

## 2023-08-29 NOTE — TELEPHONE ENCOUNTER
----- Message from Sheba Burgos sent at 8/29/2023  8:38 AM CDT -----  Contact: PT  Type:  Patient Returning Call    Who Called:PT   Who Left Message for Patient:NEIDA  Does the patient know what this is regarding?:LAB RESULTS   Would the patient rather a call back or a response via [facility]? CALL   Best Call Back Number:[phone] (home)     Additional Information: THANK YOU

## 2023-08-31 ENCOUNTER — HOSPITAL ENCOUNTER (OUTPATIENT)
Dept: RADIOLOGY | Facility: HOSPITAL | Age: 53
Discharge: HOME | End: 2023-08-31
Attending: PHYSICIAN ASSISTANT
Payer: OTHER GOVERNMENT

## 2023-08-31 DIAGNOSIS — E04.1 NODULAR THYROID DISEASE: ICD-10-CM

## 2023-08-31 PROCEDURE — 76536 US SOFT TISSUE HEAD NECK THYROID: ICD-10-PCS | Mod: 26,,, | Performed by: RADIOLOGY

## 2023-08-31 PROCEDURE — 76536 US EXAM OF HEAD AND NECK: CPT | Mod: TC

## 2023-08-31 PROCEDURE — 76536 US EXAM OF HEAD AND NECK: CPT | Mod: 26,,, | Performed by: RADIOLOGY

## 2023-09-08 ENCOUNTER — OFFICE VISIT (OUTPATIENT)
Dept: PODIATRY | Facility: CLINIC | Age: 53
End: 2023-09-08
Payer: OTHER GOVERNMENT

## 2023-09-08 VITALS — BODY MASS INDEX: 41.03 KG/M2 | HEIGHT: 69 IN | WEIGHT: 277 LBS

## 2023-09-08 DIAGNOSIS — M72.2 PLANTAR FASCIITIS: Primary | ICD-10-CM

## 2023-09-08 PROCEDURE — 99212 OFFICE O/P EST SF 10 MIN: CPT | Mod: ,,, | Performed by: PODIATRIST

## 2023-09-08 PROCEDURE — 99212 PR OFFICE/OUTPT VISIT, EST, LEVL II, 10-19 MIN: ICD-10-PCS | Mod: ,,, | Performed by: PODIATRIST

## 2023-09-08 RX ORDER — IBUPROFEN 800 MG/1
800 TABLET ORAL 3 TIMES DAILY
Qty: 45 TABLET | Refills: 0 | Status: SHIPPED | OUTPATIENT
Start: 2023-09-08 | End: 2023-09-23

## 2023-09-08 NOTE — PROGRESS NOTES
Subjective:     Patient ID: Patria Bennett is a 53 y.o. female.    Chief Complaint: Foot Pain    Patria is a 53 y.o. female who presents to the clinic complaining of heel pain in both feet, especially with the first step in the morning. The pain is described as Grabbing and Tight. The onset of the pain was gradual and has significantly improved over the past several months. Patria rates the pain as 2/10. She denies a history of trauma. Prior treatments include local corticosteroid injection which was effective, stretching and icing, use of more comfortable shoes, low dye ankle and foot taping/strapping and Rx ibuprofen which has been effective.  Patient is follow-up for previous injection for neuroma symptoms bilateral foot.  Patient states this injection totally resolved her complaints in those areas.    Review of Systems   Constitutional: Negative for chills and fever.   Cardiovascular:  Negative for chest pain and leg swelling.   Respiratory:  Negative for cough and shortness of breath.    Gastrointestinal:  Negative for diarrhea, nausea and vomiting.        Objective:     Physical Exam  Vitals reviewed.   Constitutional:       General: She is not in acute distress.     Appearance: Normal appearance. She is not ill-appearing.   HENT:      Nose: Nose normal.   Cardiovascular:      Rate and Rhythm: Normal rate.   Pulmonary:      Effort: Pulmonary effort is normal. No respiratory distress.   Skin:     Capillary Refill: Capillary refill takes 2 to 3 seconds.   Neurological:      Mental Status: She is alert and oriented to person, place, and time.   Psychiatric:         Mood and Affect: Mood normal.         Behavior: Behavior normal.         Thought Content: Thought content normal.         Judgment: Judgment normal.       Dermatologic:  No open lesions noted bilateral foot, no rashes noted bilateral foot, no interdigital maceration noted bilateral foot   Vascular:  DP  and PT pulses palpable 2/4 bilateral foot,  capillary fill time less 3 seconds to distal aspect of the digits bilateral foot   Musculoskeletal:  5/5 muscle strength noted bilateral foot, ankle joint range of motion is full without pain, mild pain and tenderness with palpation of the bilateral calcaneal medial tubercle and medial band of the plantar fascia bilateral foot particularly in the medial arch, no pain and tenderness with palpation bilateral 2nd or 3rd interspaces  Neurologic: Protective and light touch sensation intact bilateral lower extremity, positive paresthesias reported, positive burning report    Assessment:      Encounter Diagnosis   Name Primary?    Plantar fasciitis Yes     Plan:     Patria was seen today for foot pain.    Diagnoses and all orders for this visit:    Plantar fasciitis  -     ibuprofen (ADVIL,MOTRIN) 800 MG tablet; Take 1 tablet (800 mg total) by mouth 3 (three) times daily. for 15 days      I counseled the patient on her conditions, their implications and medical management.      1. Patient was examined and evaluated.    2. Again discussed with patient etiology of plantar fasciitis.  Patient was encouraged to continue with current stretching regimen.  Patient will ice and elevate the bilateral lower extremity when at rest.  Patient will continue to adjunct with ibuprofen which was refilled at today's appointment.  Patient will decrease the amount of barefoot walking limited use of slides and flip-flops.    3. Patient will continue with daily foot monitoring.  Patient will continue efforts proper glycemic control, lowering hemoglobin A1c, and adherence to diabetic medication regimen  4. Patient made aware of resolution of previous neuroma symptoms.  Patient will monitor for possible recurrence.    5. Patient will follow-up in 6 months to 1 year for annual diabetic foot exam or p.r.n. for complaints

## 2023-09-21 ENCOUNTER — OCCUPATIONAL HEALTH (OUTPATIENT)
Dept: URGENT CARE | Facility: CLINIC | Age: 53
End: 2023-09-21

## 2023-09-21 DIAGNOSIS — M79.7 FIBROMYALGIA: ICD-10-CM

## 2023-09-21 DIAGNOSIS — Z79.4 TYPE 2 DIABETES MELLITUS WITHOUT COMPLICATION, WITH LONG-TERM CURRENT USE OF INSULIN: ICD-10-CM

## 2023-09-21 DIAGNOSIS — K58.1 IRRITABLE BOWEL SYNDROME WITH CONSTIPATION: ICD-10-CM

## 2023-09-21 DIAGNOSIS — E11.9 TYPE 2 DIABETES MELLITUS WITHOUT COMPLICATION, WITH LONG-TERM CURRENT USE OF INSULIN: ICD-10-CM

## 2023-09-21 PROCEDURE — 80305 PR DRUG SCREEN - 1: ICD-10-PCS | Mod: S$GLB,,, | Performed by: EMERGENCY MEDICINE

## 2023-09-21 PROCEDURE — 80305 DRUG TEST PRSMV DIR OPT OBS: CPT | Mod: S$GLB,,, | Performed by: EMERGENCY MEDICINE

## 2023-09-21 RX ORDER — DULOXETIN HYDROCHLORIDE 60 MG/1
60 CAPSULE, DELAYED RELEASE ORAL DAILY
Qty: 30 CAPSULE | Refills: 6 | Status: SHIPPED | OUTPATIENT
Start: 2023-09-21 | End: 2024-03-22 | Stop reason: SDUPTHER

## 2023-09-21 RX ORDER — GABAPENTIN 300 MG/1
CAPSULE ORAL
Qty: 180 CAPSULE | Refills: 3 | Status: SHIPPED | OUTPATIENT
Start: 2023-09-21

## 2023-09-21 NOTE — TELEPHONE ENCOUNTER
----- Message from Ashlie Martinez sent at 9/21/2023  4:40 PM CDT -----  Type:  RX Refill Request    Who Called:  pt   Refill or New Rx:  refill  RX Name and Strength:  linaCLOtide (LINZESS) 290 mcg Cap capsule /DULoxetine (CYMBALTA) 60 MG capsule / gabapentin (NEURONTIN) 300 MG capsule  How is the patient currently taking it? (ex. 1XDay):  as direc  Is this a 30 day or 90 day RX:  90  Preferred Pharmacy with phone number:      [facility] [address] [facility]  [address] [facility]  [place] [address]  Phone: [phone] Fax: [phone]      Local or Mail Order:  local  Ordering Provider:  kwadwo Gomez Call Back Number:  [phone]    Additional Information:  please advise

## 2023-09-22 DIAGNOSIS — Z79.4 TYPE 2 DIABETES MELLITUS WITH HYPERGLYCEMIA, WITH LONG-TERM CURRENT USE OF INSULIN: ICD-10-CM

## 2023-09-22 DIAGNOSIS — E11.65 TYPE 2 DIABETES MELLITUS WITH HYPERGLYCEMIA, WITH LONG-TERM CURRENT USE OF INSULIN: ICD-10-CM

## 2023-09-22 NOTE — TELEPHONE ENCOUNTER
----- Message from Alferdo Montero sent at 9/21/2023  4:32 PM CDT -----  Please refill the medication(s) listed below.Please call the patient when the prescription(s) is ready for  at this phone number        Medication #1 empagliflozin (JARDIANCE) 25 mg tablet  Medication #2  Medication #3      Preferred Pharmacy:.      [facility]  [address]  [address] [place] [address]  Phone: [phone] Fax: [phone]      Would the patient rather a call back or a response via [facility]? CALL    Best Call Back Number:.Telephone Information:  Mobile          [phone]        Additional Information: RX REFILL

## 2023-11-01 DIAGNOSIS — E11.9 TYPE 2 DIABETES MELLITUS WITHOUT COMPLICATION: ICD-10-CM

## 2023-11-29 DIAGNOSIS — K21.9 GASTROESOPHAGEAL REFLUX DISEASE WITHOUT ESOPHAGITIS: ICD-10-CM

## 2023-11-29 DIAGNOSIS — E11.42 TYPE 2 DIABETES MELLITUS WITH DIABETIC POLYNEUROPATHY, WITH LONG-TERM CURRENT USE OF INSULIN: Primary | ICD-10-CM

## 2023-11-29 DIAGNOSIS — Z79.4 TYPE 2 DIABETES MELLITUS WITH DIABETIC POLYNEUROPATHY, WITH LONG-TERM CURRENT USE OF INSULIN: Primary | ICD-10-CM

## 2023-11-29 RX ORDER — PANTOPRAZOLE SODIUM 40 MG/1
40 TABLET, DELAYED RELEASE ORAL DAILY
Qty: 90 TABLET | Refills: 2 | Status: SHIPPED | OUTPATIENT
Start: 2023-11-29

## 2023-11-29 RX ORDER — ERGOCALCIFEROL 1.25 MG/1
50000 CAPSULE ORAL
Qty: 12 CAPSULE | Refills: 2 | Status: SHIPPED | OUTPATIENT
Start: 2023-11-29

## 2023-11-29 RX ORDER — TIRZEPATIDE 2.5 MG/.5ML
2.5 INJECTION, SOLUTION SUBCUTANEOUS
Qty: 12 PEN | Refills: 3 | Status: SHIPPED | OUTPATIENT
Start: 2023-11-29 | End: 2024-01-18

## 2023-11-29 RX ORDER — VALSARTAN AND HYDROCHLOROTHIAZIDE 160; 25 MG/1; MG/1
1 TABLET ORAL DAILY
Qty: 90 TABLET | Refills: 2 | Status: SHIPPED | OUTPATIENT
Start: 2023-11-29

## 2023-11-29 NOTE — TELEPHONE ENCOUNTER
Refill Routing Note   Medication(s) are not appropriate for processing by [facility] for the following reason(s):        Outside of protocol: vitamin d  No active prescription written by provider: diovan/hctz    OR action(s):  Defer  Route  Approve               Appointments  past 12m or future 3m with PCP    Date Provider   Last Visit   8/23/2023 Madonna Collins MD   Next Visit   2/23/2024 Madonna Collins MD   ED visits in past 90 days: 0        Note composed:1:39 PM 11/29/2023

## 2023-11-29 NOTE — TELEPHONE ENCOUNTER
No care due was identified.  [facility] Embedded Care Due Messages. Reference number: [id number].   11/29/2023 1:27:45 PM CST

## 2023-11-29 NOTE — TELEPHONE ENCOUNTER
----- Message from Lauro Awad sent at 11/29/2023  1:08 PM CST -----  Contact: Self  Type:  RX Refill Request    Who Called:  PT  Refill or New Rx:  Refill  RX Name and Strength:      pantoprazole (PROTONIX) 40 MG tablet    ergocalciferol (VITAMIN D2) 50,000 unit Cap    valsartan-hydrochlorothiazide  valsartan-hydrochlorothiazide (DIOVAN-HCT) 160-25 mg per tablet      How is the patient currently taking it? (ex. 1XDay):    Is this a 30 day or 90 day RX:    Preferred Pharmacy with phone number:          [facility] - [address]  [address] [address]  Phone: [phone] Fax: [phone]                 Best Call Back Number:  [phone]    Additional Information:

## 2023-12-05 RX ORDER — TIRZEPATIDE 5 MG/.5ML
INJECTION, SOLUTION SUBCUTANEOUS
Refills: 1 | OUTPATIENT
Start: 2023-12-05

## 2024-02-07 DIAGNOSIS — E11.9 TYPE 2 DIABETES MELLITUS WITHOUT COMPLICATION, UNSPECIFIED WHETHER LONG TERM INSULIN USE: ICD-10-CM

## 2024-02-27 ENCOUNTER — LAB VISIT (OUTPATIENT)
Dept: LAB | Facility: HOSPITAL | Age: 54
End: 2024-02-27
Attending: PHYSICIAN ASSISTANT
Payer: OTHER GOVERNMENT

## 2024-02-27 DIAGNOSIS — E11.65 TYPE 2 DIABETES MELLITUS WITH HYPERGLYCEMIA, WITH LONG-TERM CURRENT USE OF INSULIN: ICD-10-CM

## 2024-02-27 DIAGNOSIS — Z79.4 TYPE 2 DIABETES MELLITUS WITH HYPERGLYCEMIA, WITH LONG-TERM CURRENT USE OF INSULIN: ICD-10-CM

## 2024-02-27 LAB
ALBUMIN SERPL BCP-MCNC: 4.1 G/DL (ref 3.5–5.2)
ALP SERPL-CCNC: 49 U/L (ref 55–135)
ALT SERPL W/O P-5'-P-CCNC: 20 U/L (ref 10–44)
ANION GAP SERPL CALC-SCNC: 12 MMOL/L (ref 8–16)
AST SERPL-CCNC: 17 U/L (ref 10–40)
BILIRUB SERPL-MCNC: 0.7 MG/DL (ref 0.1–1)
BUN SERPL-MCNC: 19 MG/DL (ref 6–20)
CALCIUM SERPL-MCNC: 9.4 MG/DL (ref 8.7–10.5)
CHLORIDE SERPL-SCNC: 102 MMOL/L (ref 95–110)
CO2 SERPL-SCNC: 22 MMOL/L (ref 23–29)
CREAT SERPL-MCNC: 1.3 MG/DL (ref 0.5–1.4)
EST. GFR  (NO RACE VARIABLE): 49 ML/MIN/1.73 M^2
GLUCOSE SERPL-MCNC: 145 MG/DL (ref 70–110)
POTASSIUM SERPL-SCNC: 3.5 MMOL/L (ref 3.5–5.1)
PROT SERPL-MCNC: 7.6 G/DL (ref 6–8.4)
SODIUM SERPL-SCNC: 136 MMOL/L (ref 136–145)
T4 FREE SERPL-MCNC: 0.85 NG/DL (ref 0.71–1.51)
TSH SERPL DL<=0.005 MIU/L-ACNC: 3.58 UIU/ML (ref 0.4–4)

## 2024-02-27 PROCEDURE — 36415 COLL VENOUS BLD VENIPUNCTURE: CPT | Performed by: PHYSICIAN ASSISTANT

## 2024-02-27 PROCEDURE — 84443 ASSAY THYROID STIM HORMONE: CPT | Performed by: PHYSICIAN ASSISTANT

## 2024-02-27 PROCEDURE — 80053 COMPREHEN METABOLIC PANEL: CPT | Performed by: PHYSICIAN ASSISTANT

## 2024-02-27 PROCEDURE — 83036 HEMOGLOBIN GLYCOSYLATED A1C: CPT | Performed by: PHYSICIAN ASSISTANT

## 2024-02-27 PROCEDURE — 84439 ASSAY OF FREE THYROXINE: CPT | Performed by: PHYSICIAN ASSISTANT

## 2024-02-28 LAB
ESTIMATED AVG GLUCOSE: 186 MG/DL (ref 68–131)
HBA1C MFR BLD: 8.1 % (ref 4.5–6.2)

## 2024-03-04 ENCOUNTER — OFFICE VISIT (OUTPATIENT)
Dept: ENDOCRINOLOGY | Facility: CLINIC | Age: 54
End: 2024-03-04
Payer: OTHER GOVERNMENT

## 2024-03-04 VITALS
WEIGHT: 275.81 LBS | OXYGEN SATURATION: 98 % | DIASTOLIC BLOOD PRESSURE: 82 MMHG | BODY MASS INDEX: 40.85 KG/M2 | HEART RATE: 86 BPM | HEIGHT: 69 IN | TEMPERATURE: 98 F | SYSTOLIC BLOOD PRESSURE: 118 MMHG

## 2024-03-04 DIAGNOSIS — Z79.4 TYPE 2 DIABETES MELLITUS WITH DIABETIC POLYNEUROPATHY, WITH LONG-TERM CURRENT USE OF INSULIN: ICD-10-CM

## 2024-03-04 DIAGNOSIS — E04.1 NODULAR THYROID DISEASE: ICD-10-CM

## 2024-03-04 DIAGNOSIS — E53.8 VITAMIN B12 DEFICIENCY: ICD-10-CM

## 2024-03-04 DIAGNOSIS — Z78.0 POSTMENOPAUSAL: ICD-10-CM

## 2024-03-04 DIAGNOSIS — E61.1 IRON DEFICIENCY: ICD-10-CM

## 2024-03-04 DIAGNOSIS — Z79.4 TYPE 2 DIABETES MELLITUS WITH HYPERGLYCEMIA, WITH LONG-TERM CURRENT USE OF INSULIN: Primary | ICD-10-CM

## 2024-03-04 DIAGNOSIS — E78.5 HYPERLIPIDEMIA ASSOCIATED WITH TYPE 2 DIABETES MELLITUS: ICD-10-CM

## 2024-03-04 DIAGNOSIS — B37.31 VAGINAL YEAST INFECTION: ICD-10-CM

## 2024-03-04 DIAGNOSIS — I10 ESSENTIAL HYPERTENSION: ICD-10-CM

## 2024-03-04 DIAGNOSIS — E11.69 HYPERLIPIDEMIA ASSOCIATED WITH TYPE 2 DIABETES MELLITUS: ICD-10-CM

## 2024-03-04 DIAGNOSIS — E11.65 TYPE 2 DIABETES MELLITUS WITH HYPERGLYCEMIA, WITH LONG-TERM CURRENT USE OF INSULIN: Primary | ICD-10-CM

## 2024-03-04 DIAGNOSIS — E11.42 TYPE 2 DIABETES MELLITUS WITH DIABETIC POLYNEUROPATHY, WITH LONG-TERM CURRENT USE OF INSULIN: ICD-10-CM

## 2024-03-04 DIAGNOSIS — E55.9 HYPOVITAMINOSIS D: ICD-10-CM

## 2024-03-04 DIAGNOSIS — E03.9 HYPOTHYROIDISM, UNSPECIFIED TYPE: ICD-10-CM

## 2024-03-04 PROCEDURE — 99214 OFFICE O/P EST MOD 30 MIN: CPT | Mod: S$PBB,,, | Performed by: PHYSICIAN ASSISTANT

## 2024-03-04 PROCEDURE — 99215 OFFICE O/P EST HI 40 MIN: CPT | Mod: PBBFAC,PO | Performed by: PHYSICIAN ASSISTANT

## 2024-03-04 PROCEDURE — 99999 PR PBB SHADOW E&M-EST. PATIENT-LVL V: CPT | Mod: PBBFAC,,, | Performed by: PHYSICIAN ASSISTANT

## 2024-03-04 RX ORDER — LEVOTHYROXINE SODIUM 112 UG/1
112 TABLET ORAL
Qty: 30 TABLET | Refills: 11 | Status: SHIPPED | OUTPATIENT
Start: 2024-03-04 | End: 2025-03-04

## 2024-03-04 RX ORDER — FLUCONAZOLE 200 MG/1
200 TABLET ORAL
Qty: 2 TABLET | Refills: 1 | Status: SHIPPED | OUTPATIENT
Start: 2024-03-04

## 2024-03-04 RX ORDER — METFORMIN HYDROCHLORIDE 1000 MG/1
1000 TABLET ORAL 2 TIMES DAILY WITH MEALS
Qty: 180 TABLET | Refills: 3 | Status: SHIPPED | OUTPATIENT
Start: 2024-03-04

## 2024-03-04 NOTE — PROGRESS NOTES
CC: This 53 y.o. female presents for management of Diabetes Mellitus  and chronic conditions pending review including HTN, HLP    HPI: was diagnosed with T2DM in >10 years on lab work.   Has never been hospitalized r/t DM.  Family hx of DM: parents, aunts, uncles  Fhx of thyroid disease: gf had thyroid cancer, aunts,   Denies missing doses of DM medication.   hypoglycemia at home: none  monitoring BG at home:  Not checking    Diet:   1 meal daily.  Ate greens yesterday.  Avoids sugary beverages.     Exercise: none    CURRENT DM MEDS: Actos 30 mg qd (not taking), Mounjaro 7.5 mg weekly, Metformin 1000 qd, Jardiance 25 mg daily      Standards of Care:  Eye exam: Not in a while due to insurance  Podiatry exam: 7/23 Follows w/ Dr. Moreno  DE: last year    DEXA scan: 2/22 wnl. FSH is elevated. LH is normal.   On ergo 3x weekly.    MNG  S/p left thyroidectomy 12/21-benign  Thyroid u/s 8/23 shows a 2.2 cm mass in the right thyroid.   + occ sob and hoarseness. No dysphagia.   FNAs were benign of both nodules 6/21.     Hypothyroidism  LT4 100 mcg qd  + fatigue, constipation, sweating, anxiety  No diarrhea or tremors.    PMHx, PSHx: reviewed in epic.  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     Wt Readings from Last 6 Encounters:   03/04/24 125.1 kg (275 lb 12.7 oz)   09/08/23 125.6 kg (277 lb)   08/23/23 125.6 kg (277 lb)   08/16/23 127 kg (280 lb)   08/07/23 127 kg (280 lb)   06/28/23 123.4 kg (272 lb)      ROS:   Gen: Appetite good, + wt stable, denies fatigue and weakness.  Skin: Skin is intact and heals well, no rashes, no hair changes  Eyes: Denies visual disturbances  Resp: no SOB or AGUILAR, no cough  Cardiac: No palpitations, chest pain, no edema   GI:  No nausea or vomiting, diarrhea, constipation, or abdominal pain.  /GYN:+ night sweats, No nocturia, burning or pain.   MS/Neuro: + knee pain, Denies numbness/ tingling in BLE; Gait steady, speech clear  Psych: Denies drug/ETOH abuse, no hx of depression.  Other  "systems: negative.    /82 (BP Location: Right forearm, Patient Position: Sitting, BP Method: Large (Manual))   Pulse 86   Temp 98.2 °F (36.8 °C) (Oral)   Ht 5' 9" (1.753 m)   Wt 125.1 kg (275 lb 12.7 oz)   SpO2 98%   BMI 40.73 kg/m²      PE:  GENERAL: middle aged female,well developed, well nourished.  PSYCH: AAOx3, appropriate mood and affect, pleasant expression, conversant, appears relaxed, well groomed.   EYES: Conjunctiva, corneas clear  CHEST: Resp even and unlabored,   SKIN: Skin warm and dry no acanthosis nigracans.    Personally reviewed labs below:    Lab Visit on 02/27/2024   Component Date Value Ref Range Status    Hemoglobin A1C 02/27/2024 8.1 (H)  4.5 - 6.2 % Final    Comment: According to ADA guidelines, hemoglobin A1C <7.0% represents  optimal control in non-pregnant diabetic patients.  Different  metrics may apply to specific populations.   Standards of Medical Care in Diabetes - 2016.    For the purpose of screening for the presence of diabetes:  <5.7%     Consistent with the absence of diabetes  5.7-6.4%  Consistent with increasing risk for diabetes   (prediabetes)  >or=6.5%  Consistent with diabetes    Currently no consensus exists for use of hemoglobin A1C  for diagnosis of diabetes for children.      Estimated Avg Glucose 02/27/2024 186 (H)  68 - 131 mg/dL Final    Sodium 02/27/2024 136  136 - 145 mmol/L Final    Potassium 02/27/2024 3.5  3.5 - 5.1 mmol/L Final    Chloride 02/27/2024 102  95 - 110 mmol/L Final    CO2 02/27/2024 22 (L)  23 - 29 mmol/L Final    Glucose 02/27/2024 145 (H)  70 - 110 mg/dL Final    BUN 02/27/2024 19  6 - 20 mg/dL Final    Creatinine 02/27/2024 1.3  0.5 - 1.4 mg/dL Final    Calcium 02/27/2024 9.4  8.7 - 10.5 mg/dL Final    Total Protein 02/27/2024 7.6  6.0 - 8.4 g/dL Final    Albumin 02/27/2024 4.1  3.5 - 5.2 g/dL Final    Total Bilirubin 02/27/2024 0.7  0.1 - 1.0 mg/dL Final    Comment: For infants and newborns, interpretation of results should be " based  on gestational age, weight and in agreement with clinical  observations.    Premature Infant recommended reference ranges:  Up to 24 hours.............<8.0 mg/dL  Up to 48 hours............<12.0 mg/dL  3-5 days..................<15.0 mg/dL  6-29 days.................<15.0 mg/dL      Alkaline Phosphatase 02/27/2024 49 (L)  55 - 135 U/L Final    AST 02/27/2024 17  10 - 40 U/L Final    ALT 02/27/2024 20  10 - 44 U/L Final    eGFR 02/27/2024 49 (A)  >60 mL/min/1.73 m^2 Final    Anion Gap 02/27/2024 12  8 - 16 mmol/L Final    Free T4 02/27/2024 0.85  0.71 - 1.51 ng/dL Final    TSH 02/27/2024 3.577  0.400 - 4.000 uIU/mL Final       ASSESSMENT and PLAN:    1. Type 2 diabetes mellitus with hyperglycemia, with long-term current use of insulin  tirzepatide 10 mg/0.5 mL PnIj    Hemoglobin A1C    T4, Free    TSH    US Soft Tissue Head Neck    Renal Function Panel    metFORMIN (GLUCOPHAGE) 1000 MG tablet    TSH    T4, Free      2. Essential hypertension        3. Hyperlipidemia associated with type 2 diabetes mellitus        4. Postmenopausal        5. Hypovitaminosis D  Vitamin D      6. Hypothyroidism, unspecified type  SYNTHROID 112 mcg tablet      7. Type 2 diabetes mellitus with diabetic polyneuropathy, with long-term current use of insulin        8. Vitamin B12 deficiency        9. Nodular thyroid disease        10. Iron deficiency        11. Vaginal yeast infection  fluconazole (DIFLUCAN) 200 MG Tab        T2DM with hyperglycemia, neuropathy-  A1c is elevated.     Medication Changes  Increase Mounjaro to 10 mg weekly.  Increase the dose monthly.   Continue Jardiance and Metformin.    Discussed A1c and BG goals.  Advised to have an eye exam.   Reviewed  hypoglycemia, s/s and appropriate tx.   Instructed to monitor BG and bring meter/ log to every clinic visit.   - takes ASA, ACEi, statin  Neuropathy- continue gabapentin 300 mg nightly.  HTN -stable, continue meds as previously prescribed and monitor. Check bp daily  and send log in one week.    HLP -elevated  LDL , Could have increased from bs. Recheck next time. Continue crestor to 20 mg daily, LFTs WNL.   Obesity-Body mass index is 40.73 kg/m². Continue exercise and diet.  Hypovitaminosis d-low last time-continue ergocalciferol to 3x weekly. Mounjaro will help with wt loss.   Postmenopausal-dexa scan 2/24.  Vitamin B 12 ejlbdiunwz-gzzugc-pxjzxrmz injections.  Nodular thyroid disease- S/p left thyroidectomy. Repeat u/s.  Hypothyroidism-Elevated TSH. Change to synthroid 112 mcg daily. Recheck in six weeks.  Iron deficiency-stable-monitor-continue iron.  Yeast infection-start diflucan.    TFTs in six weeks  Follow-up: in 4 months with fasting lab prior and dexa/us

## 2024-03-04 NOTE — PATIENT INSTRUCTIONS
Medication Changes  Increase Mounjaro to 10 mg weekly.  Increase the dose monthly.   Continue Jardiance and Metformin.    Thyroid  Increase Synthroid to 112 mcg daily.

## 2024-03-22 ENCOUNTER — OFFICE VISIT (OUTPATIENT)
Dept: FAMILY MEDICINE | Facility: CLINIC | Age: 54
End: 2024-03-22
Payer: OTHER GOVERNMENT

## 2024-03-22 VITALS
DIASTOLIC BLOOD PRESSURE: 88 MMHG | OXYGEN SATURATION: 100 % | HEART RATE: 80 BPM | BODY MASS INDEX: 41.13 KG/M2 | RESPIRATION RATE: 18 BRPM | SYSTOLIC BLOOD PRESSURE: 130 MMHG | WEIGHT: 277.69 LBS | HEIGHT: 69 IN

## 2024-03-22 DIAGNOSIS — E11.9 TYPE 2 DIABETES MELLITUS WITHOUT COMPLICATION, WITHOUT LONG-TERM CURRENT USE OF INSULIN: Primary | ICD-10-CM

## 2024-03-22 DIAGNOSIS — F51.01 PRIMARY INSOMNIA: ICD-10-CM

## 2024-03-22 DIAGNOSIS — Z79.4 TYPE 2 DIABETES MELLITUS WITHOUT COMPLICATION, WITH LONG-TERM CURRENT USE OF INSULIN: ICD-10-CM

## 2024-03-22 DIAGNOSIS — M79.7 FIBROMYALGIA: ICD-10-CM

## 2024-03-22 DIAGNOSIS — K58.1 IRRITABLE BOWEL SYNDROME WITH CONSTIPATION: ICD-10-CM

## 2024-03-22 DIAGNOSIS — E11.9 TYPE 2 DIABETES MELLITUS WITHOUT COMPLICATION, WITH LONG-TERM CURRENT USE OF INSULIN: ICD-10-CM

## 2024-03-22 PROCEDURE — 99999 PR PBB SHADOW E&M-EST. PATIENT-LVL IV: CPT | Mod: PBBFAC,,, | Performed by: FAMILY MEDICINE

## 2024-03-22 PROCEDURE — 99214 OFFICE O/P EST MOD 30 MIN: CPT | Mod: PBBFAC,PN | Performed by: FAMILY MEDICINE

## 2024-03-22 PROCEDURE — 99214 OFFICE O/P EST MOD 30 MIN: CPT | Mod: S$PBB,,, | Performed by: FAMILY MEDICINE

## 2024-03-22 RX ORDER — TRAZODONE HYDROCHLORIDE 50 MG/1
50 TABLET ORAL NIGHTLY PRN
Qty: 90 TABLET | Refills: 3 | Status: SHIPPED | OUTPATIENT
Start: 2024-03-22 | End: 2025-03-22

## 2024-03-22 RX ORDER — ROSUVASTATIN CALCIUM 40 MG/1
40 TABLET, COATED ORAL DAILY
Qty: 90 TABLET | Refills: 3 | Status: SHIPPED | OUTPATIENT
Start: 2024-03-22 | End: 2025-03-22

## 2024-03-22 RX ORDER — DULOXETIN HYDROCHLORIDE 60 MG/1
60 CAPSULE, DELAYED RELEASE ORAL DAILY
Qty: 90 CAPSULE | Refills: 3 | Status: SHIPPED | OUTPATIENT
Start: 2024-03-22

## 2024-03-22 RX ORDER — INSULIN PUMP SYRINGE, 3 ML
EACH MISCELLANEOUS
Qty: 1 EACH | Refills: 0 | Status: SHIPPED | OUTPATIENT
Start: 2024-03-22

## 2024-03-22 NOTE — PROGRESS NOTES
Subjective:       Patient ID: Patria Bennett is a 53 y.o. female.    Chief Complaint: Follow-up    Wt Readings from Last 6 Encounters:  08/23/23 : 121.1 kg (267 lb)  08/16/23 : 127 kg (280 lb)  08/07/23 : 127 kg (280 lb)  06/28/23 : 123.4 kg (272 lb)  05/22/23 : 123.5 kg (272 lb 4.8 oz)  04/18/23 : 125.8 kg (277 lb 3.7 oz)    BP Readings from Last 3 Encounters:  08/23/23 : 104/65  08/07/23 : 112/84  05/22/23 : 134/88    Lab Results       Component                Value               Date                       HGBA1C                   6.4 (H)             05/11/2023              She has been increased on her mounjaro.     Having aches and pains all over and feels like something is wrong or off. Requesting labs      Review of Systems   Constitutional:  Negative for activity change, appetite change, fatigue and fever.   Respiratory:  Negative for shortness of breath.    Gastrointestinal:  Negative for abdominal pain.   Integumentary:  Negative for rash.         Objective:      Physical Exam  Vitals and nursing note reviewed.   Constitutional:       General: She is not in acute distress.     Appearance: She is not ill-appearing.   Cardiovascular:      Rate and Rhythm: Normal rate and regular rhythm.      Heart sounds: No murmur heard.  Pulmonary:      Effort: Pulmonary effort is normal.      Breath sounds: Normal breath sounds. No wheezing.   Skin:     General: Skin is warm and dry.      Findings: No rash.   Neurological:      Mental Status: She is alert.   Psychiatric:         Mood and Affect: Mood normal.         Behavior: Behavior normal.         Assessment:       1. Type 2 diabetes mellitus without complication, without long-term current use of insulin    2. Primary insomnia    3. Type 2 diabetes mellitus without complication, with long-term current use of insulin    4. Irritable bowel syndrome with constipation    5. Fibromyalgia        Plan:       1. Type 2 diabetes mellitus without complication, without long-term  current use of insulin  -     blood-glucose meter kit; Use as instructed. Insurance preferred.  Dispense: 1 each; Refill: 0  -     blood sugar diagnostic (BLOOD GLUCOSE TEST) Strp; Check 2x daily. Insurance preferred.  Dispense: 200 strip; Refill: 3    2. Primary insomnia  -     traZODone (DESYREL) 50 MG tablet; Take 1 tablet (50 mg total) by mouth nightly as needed for Insomnia.  Dispense: 90 tablet; Refill: 3    3. Type 2 diabetes mellitus without complication, with long-term current use of insulin  -     rosuvastatin (CRESTOR) 40 MG Tab; Take 1 tablet (40 mg total) by mouth once daily.  Dispense: 90 tablet; Refill: 3    4. Irritable bowel syndrome with constipation  -     linaCLOtide (LINZESS) 290 mcg Cap capsule; Take 1 capsule (290 mcg total) by mouth before breakfast.  Dispense: 90 capsule; Refill: 1    5. Fibromyalgia  -     DULoxetine (CYMBALTA) 60 MG capsule; Take 1 capsule (60 mg total) by mouth once daily.  Dispense: 90 capsule; Refill: 3

## 2024-04-16 DIAGNOSIS — E11.65 TYPE 2 DIABETES MELLITUS WITH HYPERGLYCEMIA, WITH LONG-TERM CURRENT USE OF INSULIN: ICD-10-CM

## 2024-04-16 DIAGNOSIS — Z79.4 TYPE 2 DIABETES MELLITUS WITH HYPERGLYCEMIA, WITH LONG-TERM CURRENT USE OF INSULIN: ICD-10-CM

## 2024-04-16 NOTE — TELEPHONE ENCOUNTER
----- Message from Lauro Awad sent at 4/16/2024 11:07 AM CDT -----  Contact: Self  Type:  RX Refill Request    Who Called:  PT  Refill or New Rx:  Refill  RX Name and Strength:  tirzepatide 10 mg/0.5 mL PnIj    Preferred Pharmacy with phone number:        [facility] [place] [facility] [address] [facility]  [address] [facility] [address]  [place] [address]  Phone: [phone] Fax: [phone]          Local or Mail Order:  mail  Ordering Provider:    Best Call Back Number:  [phone]    Additional Information:  PT asked that an expiration date not be put on RX and a 90 day supply. PT would like a phone call before the RX is sent.

## 2024-05-14 ENCOUNTER — TELEPHONE (OUTPATIENT)
Dept: ENDOCRINOLOGY | Facility: CLINIC | Age: 54
End: 2024-05-14
Payer: OTHER GOVERNMENT

## 2024-05-14 NOTE — TELEPHONE ENCOUNTER
Patient stated the Ozempic has to be sent 2 and .05 separate with 90 day supply with no expiration date on it.

## 2024-05-14 NOTE — TELEPHONE ENCOUNTER
----- Message from Kip Burgos sent at 5/14/2024  2:47 PM CDT -----  Type: Needs Medical Advice  Who Called:  pt  Symptoms (please be specific):  said she need to speak to nurse--said she been having problems getting her meds in the mail--said she need to see if she can be switched over to another med that they have in stock --please call and advise-- [facility]    Pharmacy name and phone #:    [facility] - [address]  [address] [address]  Phone: [phone] Fax: [phone]  Best Call Back Number: [phone] (home)     Additional Information: thank you--said she need a call to know if this was taken care of--she would like the office to please call her

## 2024-05-30 RX ORDER — SEMAGLUTIDE 2.68 MG/ML
2 INJECTION, SOLUTION SUBCUTANEOUS
Qty: 3 ML | Refills: 11 | Status: SHIPPED | OUTPATIENT
Start: 2024-05-30 | End: 2024-06-11

## 2024-06-11 RX ORDER — SEMAGLUTIDE 2.68 MG/ML
2 INJECTION, SOLUTION SUBCUTANEOUS
Qty: 3 ML | Refills: 11 | Status: SHIPPED | OUTPATIENT
Start: 2024-06-11

## 2024-06-25 ENCOUNTER — HOSPITAL ENCOUNTER (OUTPATIENT)
Dept: RADIOLOGY | Facility: HOSPITAL | Age: 54
Discharge: HOME | End: 2024-06-25
Attending: PHYSICIAN ASSISTANT
Payer: OTHER GOVERNMENT

## 2024-06-25 DIAGNOSIS — Z79.4 TYPE 2 DIABETES MELLITUS WITH HYPERGLYCEMIA, WITH LONG-TERM CURRENT USE OF INSULIN: ICD-10-CM

## 2024-06-25 DIAGNOSIS — E11.65 TYPE 2 DIABETES MELLITUS WITH HYPERGLYCEMIA, WITH LONG-TERM CURRENT USE OF INSULIN: ICD-10-CM

## 2024-06-25 PROCEDURE — 76536 US EXAM OF HEAD AND NECK: CPT | Mod: 26,,, | Performed by: RADIOLOGY

## 2024-06-25 PROCEDURE — 76536 US EXAM OF HEAD AND NECK: CPT | Mod: TC

## 2024-07-01 ENCOUNTER — TELEPHONE (OUTPATIENT)
Dept: ENDOCRINOLOGY | Facility: CLINIC | Age: 54
End: 2024-07-01
Payer: OTHER GOVERNMENT

## 2024-07-01 NOTE — TELEPHONE ENCOUNTER
Spoke with patient, she request to call pharmacy regarding code to release Ozempic [facility] meds by mail [phone]

## 2024-07-02 ENCOUNTER — HOSPITAL ENCOUNTER (OUTPATIENT)
Dept: RADIOLOGY | Facility: CLINIC | Age: 54
Discharge: HOME | End: 2024-07-02
Attending: PHYSICIAN ASSISTANT
Payer: OTHER GOVERNMENT

## 2024-07-02 DIAGNOSIS — Z78.0 POSTMENOPAUSAL: ICD-10-CM

## 2024-07-02 DIAGNOSIS — Z79.4 TYPE 2 DIABETES MELLITUS WITH HYPERGLYCEMIA, WITH LONG-TERM CURRENT USE OF INSULIN: Primary | ICD-10-CM

## 2024-07-02 DIAGNOSIS — E11.65 TYPE 2 DIABETES MELLITUS WITH HYPERGLYCEMIA, WITH LONG-TERM CURRENT USE OF INSULIN: Primary | ICD-10-CM

## 2024-07-02 PROCEDURE — 77080 DXA BONE DENSITY AXIAL: CPT | Mod: TC,PO

## 2024-07-02 PROCEDURE — 77080 DXA BONE DENSITY AXIAL: CPT | Mod: 26,,, | Performed by: RADIOLOGY

## 2024-07-02 RX ORDER — SEMAGLUTIDE 2.68 MG/ML
2 INJECTION, SOLUTION SUBCUTANEOUS
Qty: 9 ML | Refills: 3 | Status: SHIPPED | OUTPATIENT
Start: 2024-07-02

## 2024-07-02 NOTE — TELEPHONE ENCOUNTER
Spoke with Narcisa with [facility] pharmacy(,[phone]) they need diagnosis code somewhere on the prescription before getting approved, she stated there should be an area for the dx code or it can be put in comments section.

## 2024-07-09 ENCOUNTER — OFFICE VISIT (OUTPATIENT)
Dept: URGENT CARE | Facility: CLINIC | Age: 54
End: 2024-07-09
Payer: OTHER GOVERNMENT

## 2024-07-09 VITALS
OXYGEN SATURATION: 96 % | DIASTOLIC BLOOD PRESSURE: 95 MMHG | TEMPERATURE: 100 F | BODY MASS INDEX: 39.69 KG/M2 | WEIGHT: 268 LBS | SYSTOLIC BLOOD PRESSURE: 162 MMHG | RESPIRATION RATE: 16 BRPM | HEART RATE: 95 BPM | HEIGHT: 69 IN

## 2024-07-09 DIAGNOSIS — U07.1 COVID-19 VIRUS DETECTED: ICD-10-CM

## 2024-07-09 DIAGNOSIS — R09.81 NASAL CONGESTION: Primary | ICD-10-CM

## 2024-07-09 DIAGNOSIS — U07.1 COVID-19: ICD-10-CM

## 2024-07-09 DIAGNOSIS — H66.93 ACUTE OTITIS MEDIA, BILATERAL: ICD-10-CM

## 2024-07-09 LAB
CTP QC/QA: YES
FLUAV AG NPH QL: NEGATIVE
FLUBV AG NPH QL: NEGATIVE
S PYO RRNA THROAT QL PROBE: NEGATIVE
SARS-COV-2 AG RESP QL IA.RAPID: POSITIVE

## 2024-07-09 PROCEDURE — 87811 SARS-COV-2 COVID19 W/OPTIC: CPT | Mod: QW,S$GLB,, | Performed by: STUDENT IN AN ORGANIZED HEALTH CARE EDUCATION/TRAINING PROGRAM

## 2024-07-09 PROCEDURE — 87880 STREP A ASSAY W/OPTIC: CPT | Mod: QW,,, | Performed by: STUDENT IN AN ORGANIZED HEALTH CARE EDUCATION/TRAINING PROGRAM

## 2024-07-09 PROCEDURE — 87804 INFLUENZA ASSAY W/OPTIC: CPT | Mod: QW,,, | Performed by: STUDENT IN AN ORGANIZED HEALTH CARE EDUCATION/TRAINING PROGRAM

## 2024-07-09 PROCEDURE — 99214 OFFICE O/P EST MOD 30 MIN: CPT | Mod: 25,S$GLB,, | Performed by: STUDENT IN AN ORGANIZED HEALTH CARE EDUCATION/TRAINING PROGRAM

## 2024-07-09 RX ORDER — AZELASTINE 1 MG/ML
2 SPRAY, METERED NASAL 2 TIMES DAILY
Qty: 30 ML | Refills: 0 | Status: SHIPPED | OUTPATIENT
Start: 2024-07-09 | End: 2025-07-09

## 2024-07-09 RX ORDER — AMOXICILLIN AND CLAVULANATE POTASSIUM 875; 125 MG/1; MG/1
1 TABLET, FILM COATED ORAL EVERY 12 HOURS
Qty: 20 TABLET | Refills: 0 | Status: SHIPPED | OUTPATIENT
Start: 2024-07-09 | End: 2024-07-19

## 2024-07-09 NOTE — PROGRESS NOTES
"Subjective:      Patient ID: Patria Bennett is a 54 y.o. female.    Vitals:  height is 5' 9" (1.753 m) and weight is 121.6 kg (268 lb). Her oral temperature is 99.8 °F (37.7 °C). Her blood pressure is 162/95 (abnormal) and her pulse is 95. Her respiration is 16 and oxygen saturation is 96%.     Chief Complaint: Nasal Congestion    Patient is a 54-year-old female with a past medical history anxiety, depression, hypertension, hyperlipidemia, type 2 diabetes, GERD, insomnia, anemia who presents to clinic for evaluation of URI and sinus related issues.  Patient reports symptoms began yesterday.  Patient reports no over-the-counter medications for symptoms at this point.  Patient reports no recent or known sick exposures.  Patient reports she is not vaccinated.    Follow-up  This is a new problem. The current episode started yesterday. The problem occurs constantly. The problem has been unchanged. Associated symptoms include chills, congestion, coughing, fatigue, headaches, myalgias and a sore throat. Pertinent negatives include no abdominal pain, chest pain, nausea, rash or vomiting. The symptoms are aggravated by drinking and eating. She has tried nothing for the symptoms. The treatment provided no relief.       Constitution: Positive for chills and fatigue.   HENT:  Positive for ear pain, congestion, postnasal drip and sore throat. Negative for ear discharge, tinnitus and hearing loss.    Neck: neck negative.   Cardiovascular: Negative.  Negative for chest pain and palpitations.   Eyes: Negative.    Respiratory:  Positive for cough. Negative for chest tightness, sputum production and shortness of breath.    Gastrointestinal: Negative.  Negative for abdominal pain, nausea, vomiting and diarrhea.   Endocrine: negative.   Genitourinary: Negative.  Negative for dysuria, frequency and urgency.   Musculoskeletal:  Positive for muscle ache.   Skin: Negative.  Negative for color change, pale, rash and erythema. "   Allergic/Immunologic: Negative.    Neurological:  Positive for headaches. Negative for dizziness, light-headedness, passing out, disorientation and altered mental status.   Hematologic/Lymphatic: Negative.    Psychiatric/Behavioral: Negative.  Negative for altered mental status, disorientation and confusion.       Objective:     Physical Exam   Constitutional: She is oriented to person, place, and time. She appears well-developed. She is cooperative.  Non-toxic appearance. She appears ill. No distress.   HENT:   Head: Normocephalic and atraumatic.   Ears:   Right Ear: Hearing, external ear and ear canal normal. No no drainage, swelling or tenderness. Tympanic membrane is erythematous and bulging. Tympanic membrane is not perforated. No decreased hearing is noted.   Left Ear: Hearing, external ear and ear canal normal. No no drainage, swelling or tenderness. Tympanic membrane is erythematous (Left greater than right) and bulging. Tympanic membrane is not perforated. No decreased hearing is noted.   Nose: Congestion present. No mucosal edema, rhinorrhea or nasal deformity. No epistaxis. Right sinus exhibits no maxillary sinus tenderness and no frontal sinus tenderness. Left sinus exhibits no maxillary sinus tenderness and no frontal sinus tenderness.   Mouth/Throat: Uvula is midline and mucous membranes are normal. Mucous membranes are moist. No trismus in the jaw. Normal dentition. No uvula swelling. Posterior oropharyngeal erythema present. No oropharyngeal exudate. Oropharynx is clear.   Eyes: Conjunctivae and lids are normal. Pupils are equal, round, and reactive to light. Right eye exhibits no discharge. Left eye exhibits no discharge. No scleral icterus.   Neck: Trachea normal and phonation normal. Neck supple. No neck rigidity present.   Cardiovascular: Normal rate, regular rhythm and normal pulses.   Pulmonary/Chest: Effort normal and breath sounds normal. No respiratory distress (Unlabored.  Equal rise and  fall of chest.  Able speak in full complete sentences.  No adventitious breath sounds noted.). She has no wheezes. She has no rhonchi. She has no rales.   Abdominal: Normal appearance and bowel sounds are normal. She exhibits no distension. Soft. There is no abdominal tenderness.   Musculoskeletal: Normal range of motion.         General: Normal range of motion.      Cervical back: She exhibits no tenderness.   Lymphadenopathy:     She has no cervical adenopathy.   Neurological: She is alert and oriented to person, place, and time. She exhibits normal muscle tone.   Skin: Skin is warm, dry, intact, not diaphoretic, not pale and no rash. Capillary refill takes 2 to 3 seconds. No erythema   Psychiatric: Her speech is normal and behavior is normal. Judgment and thought content normal.   Nursing note and vitals reviewed.      Assessment:     1. Nasal congestion    2. Acute otitis media, bilateral    3. COVID-19        Plan:       Nasal congestion  -     SARS Coronavirus 2 Antigen, POCT Manual Read  -     POCT rapid strep A  -     POCT Influenza A/B    Acute otitis media, bilateral    COVID-19    Other orders  -     amoxicillin-clavulanate 875-125mg (AUGMENTIN) 875-125 mg per tablet; Take 1 tablet by mouth every 12 (twelve) hours. for 10 days  Dispense: 20 tablet; Refill: 0  -     azelastine (ASTELIN) 137 mcg (0.1 %) nasal spray; 2 sprays (274 mcg total) by Nasal route 2 (two) times daily.  Dispense: 30 mL; Refill: 0  -     pyrilamine-chlophedianoL 12.5-12.5 mg/5 mL Liqd; Take 5 mLs by mouth every 6 to 8 hours as needed (Cough).  Dispense: 118 mL; Refill: 0                Labs:  COVID positive.  Influenza a and B negative.  Rapid strep negative.  Quarantine as directed.  Quarantine information provided to patient.  COVID recommendations provided.  (Vitamin-C 2000 mg daily, vitamin D3 1000 IU daily, Pepcid 40 mg daily, Zyrtec 10 mg daily, zinc 50 mg daily)  Tylenol per package instructions for any pain or fever.  May  rotate with Motrin if unable to control pain or fever along with Tylenol.  Monitor home SpO2 and present to emergency department with readings less than 92%.  Take medications as prescribed.  Assure adequate hydration.  Follow-up with PCP in 1-2 days.  Return to clinic as needed.  To ED for any new or acutely worsening symptoms including but not limited to chest pain, palpitations, shortness of breath, or fever greater than 103° F.  Patient in agreement with plan of care.    DISCLAIMER: Please note that my documentation in this Electronic Healthcare Record was produced using speech recognition software and therefore may contain errors related to that software system.These could include grammar, punctuation and spelling errors or the inclusion/exclusion of phrases that were not intended. Garbled syntax, mangled pronouns, and other bizarre constructions may be attributed to that software system.

## 2024-07-18 ENCOUNTER — OFFICE VISIT (OUTPATIENT)
Dept: ENDOCRINOLOGY | Facility: CLINIC | Age: 54
End: 2024-07-18
Payer: OTHER GOVERNMENT

## 2024-07-18 VITALS
SYSTOLIC BLOOD PRESSURE: 134 MMHG | DIASTOLIC BLOOD PRESSURE: 80 MMHG | OXYGEN SATURATION: 100 % | WEIGHT: 271.19 LBS | BODY MASS INDEX: 40.17 KG/M2 | HEART RATE: 88 BPM | HEIGHT: 69 IN | TEMPERATURE: 98 F

## 2024-07-18 DIAGNOSIS — E11.69 HYPERLIPIDEMIA ASSOCIATED WITH TYPE 2 DIABETES MELLITUS: ICD-10-CM

## 2024-07-18 DIAGNOSIS — Z79.4 TYPE 2 DIABETES MELLITUS WITH DIABETIC POLYNEUROPATHY, WITH LONG-TERM CURRENT USE OF INSULIN: ICD-10-CM

## 2024-07-18 DIAGNOSIS — E61.1 IRON DEFICIENCY: ICD-10-CM

## 2024-07-18 DIAGNOSIS — E04.1 NODULAR THYROID DISEASE: ICD-10-CM

## 2024-07-18 DIAGNOSIS — E55.9 HYPOVITAMINOSIS D: ICD-10-CM

## 2024-07-18 DIAGNOSIS — I10 ESSENTIAL HYPERTENSION: ICD-10-CM

## 2024-07-18 DIAGNOSIS — E03.9 HYPOTHYROIDISM, UNSPECIFIED TYPE: ICD-10-CM

## 2024-07-18 DIAGNOSIS — E78.5 HYPERLIPIDEMIA ASSOCIATED WITH TYPE 2 DIABETES MELLITUS: ICD-10-CM

## 2024-07-18 DIAGNOSIS — E11.42 TYPE 2 DIABETES MELLITUS WITH DIABETIC POLYNEUROPATHY, WITH LONG-TERM CURRENT USE OF INSULIN: ICD-10-CM

## 2024-07-18 DIAGNOSIS — Z79.4 TYPE 2 DIABETES MELLITUS WITH HYPERGLYCEMIA, WITH LONG-TERM CURRENT USE OF INSULIN: Primary | ICD-10-CM

## 2024-07-18 DIAGNOSIS — E11.65 TYPE 2 DIABETES MELLITUS WITH HYPERGLYCEMIA, WITH LONG-TERM CURRENT USE OF INSULIN: Primary | ICD-10-CM

## 2024-07-18 DIAGNOSIS — Z78.0 POSTMENOPAUSAL: ICD-10-CM

## 2024-07-18 PROCEDURE — 99214 OFFICE O/P EST MOD 30 MIN: CPT | Mod: PBBFAC,PO | Performed by: PHYSICIAN ASSISTANT

## 2024-07-18 PROCEDURE — 99999 PR PBB SHADOW E&M-EST. PATIENT-LVL IV: CPT | Mod: PBBFAC,,, | Performed by: PHYSICIAN ASSISTANT

## 2024-07-18 PROCEDURE — 99213 OFFICE O/P EST LOW 20 MIN: CPT | Mod: S$PBB,,, | Performed by: PHYSICIAN ASSISTANT

## 2024-07-18 RX ORDER — ERGOCALCIFEROL 1.25 MG/1
CAPSULE ORAL
Qty: 36 CAPSULE | Refills: 3 | Status: SHIPPED | OUTPATIENT
Start: 2024-07-18

## 2024-07-18 RX ORDER — VALSARTAN AND HYDROCHLOROTHIAZIDE 160; 25 MG/1; MG/1
1 TABLET ORAL DAILY
Qty: 90 TABLET | Refills: 2 | Status: SHIPPED | OUTPATIENT
Start: 2024-07-18

## 2024-07-18 RX ORDER — GABAPENTIN 400 MG/1
400 CAPSULE ORAL 2 TIMES DAILY
Qty: 180 CAPSULE | Refills: 3 | Status: SHIPPED | OUTPATIENT
Start: 2024-07-18 | End: 2025-07-18

## 2024-07-18 RX ORDER — ERGOCALCIFEROL 1.25 MG/1
50000 CAPSULE ORAL
Qty: 12 CAPSULE | Refills: 2 | Status: SHIPPED | OUTPATIENT
Start: 2024-07-18

## 2024-07-18 NOTE — PROGRESS NOTES
CC: This 54 y.o. female presents for management of Diabetes Mellitus  and chronic conditions pending review including HTN, HLP    HPI: was diagnosed with T2DM in >10 years on lab work.   Has never been hospitalized r/t DM.  Family hx of DM: parents, aunts, uncles  Fhx of thyroid disease: gf had thyroid cancer, aunts,   Denies missing doses of DM medication.   hypoglycemia at home: none  monitoring BG at home:  Not checking    Diet:   1 meal daily.  Ate greens yesterday.  Avoids sugary beverages.     Exercise: none    CURRENT DM MEDS: Ozempic 2 mg weekly (recently started), Metformin 1000 qd, Jardiance 25 mg daily      Standards of Care:  Eye exam: Not in a while due to insurance  Podiatry exam: 9/23 Follows w/ Dr. Moreno  DE: last year    DEXA scan: 7/24 wnl.   On ergo 3x weekly.    MNG  S/p left thyroidectomy 12/21-benign  Thyroid u/s 6/24 shows a 2.3 cm mass in the right thyroid.   + occ sob and hoarseness. No dysphagia.   FNAs were benign of both nodules 6/21.     Hypothyroidism  LT4 112 mcg qd  + constipation, sweating, anxiety, wt loss.  No diarrhea, hair loss, fatigue or tremors.    PMHx, PSHx: reviewed in epic.  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     Wt Readings from Last 6 Encounters:   07/18/24 123 kg (271 lb 2.7 oz)   07/09/24 121.6 kg (268 lb)   03/22/24 126 kg (277 lb 11.2 oz)   03/04/24 125.1 kg (275 lb 12.7 oz)   09/08/23 125.6 kg (277 lb)   08/23/23 125.6 kg (277 lb)      ROS:   Gen: Appetite good, + wt stable, denies fatigue and weakness.  Skin: Skin is intact and heals well, no rashes, no hair changes  Eyes: Denies visual disturbances  Resp: no SOB or AGUILAR, no cough  Cardiac: No palpitations, chest pain, no edema   GI:  No nausea or vomiting, diarrhea, constipation, or abdominal pain.  /GYN:+ night sweats, No nocturia, burning or pain.   MS/Neuro: + knee pain, Denies numbness/ tingling in BLE; Gait steady, speech clear  Psych: Denies drug/ETOH abuse, no hx of depression.  Other systems:  "negative.    /80 (BP Location: Right arm, Patient Position: Sitting, BP Method: Large (Manual))   Pulse 88   Temp 98.4 °F (36.9 °C) (Oral)   Ht 5' 9" (1.753 m)   Wt 123 kg (271 lb 2.7 oz)   SpO2 100%   BMI 40.04 kg/m²      PE:  GENERAL: middle aged female,well developed, well nourished.  PSYCH: AAOx3, appropriate mood and affect, pleasant expression, conversant, appears relaxed, well groomed.   EYES: Conjunctiva, corneas clear  CHEST: Resp even and unlabored,   SKIN: Skin warm and dry no acanthosis nigracans.    Personally reviewed labs below:    Office Visit on 07/09/2024   Component Date Value Ref Range Status    SARS Coronavirus 2 Antigen 07/09/2024 Positive (A)  Negative Final     Acceptable 07/09/2024 Yes   Final    Rapid Strep A Screen 07/09/2024 Negative  Negative Final     Acceptable 07/09/2024 Yes   Final    Rapid Influenza A Ag 07/09/2024 Negative  Negative Final    Rapid Influenza B Ag 07/09/2024 Negative  Negative Final     Acceptable 07/09/2024 Yes   Final   Lab Visit on 06/25/2024   Component Date Value Ref Range Status    Hemoglobin A1C 06/25/2024 7.4 (H)  4.5 - 6.2 % Final    Comment: ADA Screening Guidelines:  5.7-6.4%  Consistent with prediabetes  >or=6.5%  Consistent with diabetes    High levels of fetal hemoglobin interfere with the HbA1C  assay. Heterozygous hemoglobin variants (HbS, HgC, etc)do  not significantly interfere with this assay.   However, presence of multiple variants may affect accuracy.      Estimated Avg Glucose 06/25/2024 166 (H)  68 - 131 mg/dL Final    Free T4 06/25/2024 0.85  0.71 - 1.51 ng/dL Final    TSH 06/25/2024 2.798  0.400 - 4.000 uIU/mL Final    Vit D, 25-Hydroxy 06/25/2024 16 (L)  30 - 96 ng/mL Final    Comment: Vitamin D deficiency.........<10 ng/mL                              Vitamin D insufficiency......10-29 ng/mL       Vitamin D sufficiency........> or equal to 30 ng/mL  Vitamin D toxicity............>100 " ng/mL      Glucose 06/25/2024 173 (H)  70 - 110 mg/dL Final    Sodium 06/25/2024 138  136 - 145 mmol/L Final    Potassium 06/25/2024 3.6  3.5 - 5.1 mmol/L Final    Chloride 06/25/2024 103  95 - 110 mmol/L Final    CO2 06/25/2024 22 (L)  23 - 29 mmol/L Final    BUN 06/25/2024 14  6 - 20 mg/dL Final    Calcium 06/25/2024 9.4  8.7 - 10.5 mg/dL Final    Creatinine 06/25/2024 1.0  0.5 - 1.4 mg/dL Final    Albumin 06/25/2024 3.7  3.5 - 5.2 g/dL Final    Phosphorus 06/25/2024 4.3  2.7 - 4.5 mg/dL Final    eGFR 06/25/2024 >60  >60 mL/min/1.73 m^2 Final    Anion Gap 06/25/2024 13  8 - 16 mmol/L Final       ASSESSMENT and PLAN:    No diagnosis found.    T2DM with hyperglycemia, neuropathy-  A1c is elevated.   Recently started Ozempic.   Continue metformin & jardiance.    Discussed A1c and BG goals.  Advised to have an eye exam.   Reviewed  hypoglycemia, s/s and appropriate tx.   Instructed to monitor BG and bring meter/ log to every clinic visit.   - takes ASA, ACEi, statin  Neuropathy- worsening-increase gabapentin to 400 mg bid.  HTN -stable, continue meds as previously prescribed and monitor.   HLP -elevated  LDL , Could have increased from bs. Recheck next time. Continue crestor to 20 mg daily, LFTs WNL.   Obesity-Body mass index is 40.04 kg/m². Continue exercise and diet.  Hypovitaminosis d-low last time-continue ergocalciferol to 3x weekly.   Postmenopausal-dexa scan 7/26.  Vitamin B 12 vjhdrdrrxv-fjgcsf-wsuhesmg injections.  Nodular thyroid disease- S/p left thyroidectomy. Repeat u/s in 6/25.  Hypothyroidism-stable. Continue to synthroid 112 mcg daily. Recheck in six weeks.  Iron deficiency-stable-monitor-continue iron.      Follow-up:    4 months with fasting lab prior -A1c, cmp, tfts, lp, mac, vd

## 2024-07-31 DIAGNOSIS — E55.9 HYPOVITAMINOSIS D: ICD-10-CM

## 2024-07-31 RX ORDER — ERGOCALCIFEROL 1.25 MG/1
CAPSULE ORAL
Qty: 36 CAPSULE | Refills: 4 | Status: SHIPPED | OUTPATIENT
Start: 2024-07-31

## 2024-10-28 ENCOUNTER — OFFICE VISIT (OUTPATIENT)
Dept: FAMILY MEDICINE | Facility: CLINIC | Age: 54
End: 2024-10-28
Payer: OTHER GOVERNMENT

## 2024-10-28 VITALS
HEIGHT: 69 IN | SYSTOLIC BLOOD PRESSURE: 138 MMHG | DIASTOLIC BLOOD PRESSURE: 86 MMHG | HEART RATE: 88 BPM | WEIGHT: 271.69 LBS | RESPIRATION RATE: 18 BRPM | OXYGEN SATURATION: 99 % | BODY MASS INDEX: 40.24 KG/M2

## 2024-10-28 DIAGNOSIS — K58.1 IRRITABLE BOWEL SYNDROME WITH CONSTIPATION: ICD-10-CM

## 2024-10-28 DIAGNOSIS — F51.01 PRIMARY INSOMNIA: ICD-10-CM

## 2024-10-28 DIAGNOSIS — E11.9 TYPE 2 DIABETES MELLITUS WITHOUT COMPLICATION, WITHOUT LONG-TERM CURRENT USE OF INSULIN: ICD-10-CM

## 2024-10-28 DIAGNOSIS — Z12.31 SCREENING MAMMOGRAM FOR BREAST CANCER: ICD-10-CM

## 2024-10-28 DIAGNOSIS — J30.1 SEASONAL ALLERGIC RHINITIS DUE TO POLLEN: ICD-10-CM

## 2024-10-28 DIAGNOSIS — B37.31 VAGINAL YEAST INFECTION: ICD-10-CM

## 2024-10-28 DIAGNOSIS — I10 ESSENTIAL HYPERTENSION: Primary | ICD-10-CM

## 2024-10-28 PROCEDURE — 99214 OFFICE O/P EST MOD 30 MIN: CPT | Mod: S$PBB,,, | Performed by: FAMILY MEDICINE

## 2024-10-28 PROCEDURE — 99214 OFFICE O/P EST MOD 30 MIN: CPT | Mod: PBBFAC,PN | Performed by: FAMILY MEDICINE

## 2024-10-28 PROCEDURE — 99999 PR PBB SHADOW E&M-EST. PATIENT-LVL IV: CPT | Mod: PBBFAC,,, | Performed by: FAMILY MEDICINE

## 2024-10-28 RX ORDER — VALSARTAN AND HYDROCHLOROTHIAZIDE 160; 25 MG/1; MG/1
1 TABLET ORAL DAILY
Qty: 90 TABLET | Refills: 2 | Status: SHIPPED | OUTPATIENT
Start: 2024-10-28

## 2024-10-28 RX ORDER — FLUCONAZOLE 200 MG/1
200 TABLET ORAL
Qty: 2 TABLET | Refills: 1 | Status: SHIPPED | OUTPATIENT
Start: 2024-10-28

## 2024-10-28 RX ORDER — CYCLOBENZAPRINE HCL 10 MG
10 TABLET ORAL 2 TIMES DAILY PRN
Qty: 180 TABLET | Refills: 1 | Status: SHIPPED | OUTPATIENT
Start: 2024-10-28

## 2024-10-28 RX ORDER — NEOMYCIN SULFATE, POLYMYXIN B SULFATE AND HYDROCORTISONE 10; 3.5; 1 MG/ML; MG/ML; [USP'U]/ML
3 SUSPENSION/ DROPS AURICULAR (OTIC) 3 TIMES DAILY
Qty: 10 ML | Refills: 0 | Status: SHIPPED | OUTPATIENT
Start: 2024-10-28 | End: 2024-11-04

## 2024-10-28 RX ORDER — DOXEPIN HYDROCHLORIDE 10 MG/1
10 CAPSULE ORAL NIGHTLY PRN
Qty: 30 CAPSULE | Refills: 11 | Status: SHIPPED | OUTPATIENT
Start: 2024-10-28 | End: 2025-10-28

## 2024-10-28 RX ORDER — CETIRIZINE HYDROCHLORIDE 10 MG/1
10 TABLET ORAL DAILY
Qty: 90 TABLET | Refills: 3 | Status: SHIPPED | OUTPATIENT
Start: 2024-10-28 | End: 2025-10-28

## 2024-11-04 ENCOUNTER — TELEPHONE (OUTPATIENT)
Dept: FAMILY MEDICINE | Facility: CLINIC | Age: 54
End: 2024-11-04
Payer: OTHER GOVERNMENT

## 2024-11-04 NOTE — TELEPHONE ENCOUNTER
----- Message from Kelly sent at 11/4/2024  1:10 PM CST -----  Regarding: advice  Type:  Needs Medical Advice    Who Called: pt    Best Call Back Number: [phone]      Additional Information: pt needs a copy of her mammo  order fax [facility].  please call to discuss.          Fax:[phone]

## 2024-11-09 ENCOUNTER — LAB VISIT (OUTPATIENT)
Dept: LAB | Facility: HOSPITAL | Age: 54
End: 2024-11-09
Attending: PHYSICIAN ASSISTANT
Payer: OTHER GOVERNMENT

## 2024-11-09 DIAGNOSIS — E55.9 HYPOVITAMINOSIS D: ICD-10-CM

## 2024-11-09 DIAGNOSIS — E11.65 TYPE 2 DIABETES MELLITUS WITH HYPERGLYCEMIA, WITH LONG-TERM CURRENT USE OF INSULIN: ICD-10-CM

## 2024-11-09 DIAGNOSIS — Z79.4 TYPE 2 DIABETES MELLITUS WITH HYPERGLYCEMIA, WITH LONG-TERM CURRENT USE OF INSULIN: ICD-10-CM

## 2024-11-09 LAB
ALBUMIN SERPL BCP-MCNC: 3.7 G/DL (ref 3.5–5.2)
ALBUMIN/CREAT UR: 28.8 UG/MG (ref 0–30)
ALP SERPL-CCNC: 44 U/L (ref 40–150)
ALT SERPL W/O P-5'-P-CCNC: 21 U/L (ref 10–44)
ANION GAP SERPL CALC-SCNC: 10 MMOL/L (ref 8–16)
AST SERPL-CCNC: 16 U/L (ref 10–40)
BILIRUB SERPL-MCNC: 0.4 MG/DL (ref 0.1–1)
BUN SERPL-MCNC: 12 MG/DL (ref 6–20)
CALCIUM SERPL-MCNC: 9.2 MG/DL (ref 8.7–10.5)
CHLORIDE SERPL-SCNC: 105 MMOL/L (ref 95–110)
CHOLEST SERPL-MCNC: 212 MG/DL (ref 120–199)
CHOLEST/HDLC SERPL: 5.9 {RATIO} (ref 2–5)
CO2 SERPL-SCNC: 23 MMOL/L (ref 23–29)
CREAT SERPL-MCNC: 0.8 MG/DL (ref 0.5–1.4)
CREAT UR-MCNC: 121.2 MG/DL (ref 15–325)
EST. GFR  (NO RACE VARIABLE): >60 ML/MIN/1.73 M^2
ESTIMATED AVG GLUCOSE: 166 MG/DL (ref 68–131)
GLUCOSE SERPL-MCNC: 133 MG/DL (ref 70–110)
HBA1C MFR BLD: 7.4 % (ref 4.5–6.2)
HDLC SERPL-MCNC: 36 MG/DL (ref 40–75)
HDLC SERPL: 17 % (ref 20–50)
LDLC SERPL CALC-MCNC: 134 MG/DL (ref 63–159)
MICROALBUMIN UR DL<=1MG/L-MCNC: 34.9 UG/ML (ref 0–4999.9)
NONHDLC SERPL-MCNC: 176 MG/DL
POTASSIUM SERPL-SCNC: 3.9 MMOL/L (ref 3.5–5.1)
PROT SERPL-MCNC: 7 G/DL (ref 6–8.4)
SODIUM SERPL-SCNC: 138 MMOL/L (ref 136–145)
T4 FREE SERPL-MCNC: 0.77 NG/DL (ref 0.71–1.51)
TRIGL SERPL-MCNC: 210 MG/DL (ref 30–150)
TSH SERPL DL<=0.005 MIU/L-ACNC: 3.4 UIU/ML (ref 0.4–4)

## 2024-11-09 PROCEDURE — 84439 ASSAY OF FREE THYROXINE: CPT | Performed by: PHYSICIAN ASSISTANT

## 2024-11-09 PROCEDURE — 84443 ASSAY THYROID STIM HORMONE: CPT | Performed by: PHYSICIAN ASSISTANT

## 2024-11-09 PROCEDURE — 36415 COLL VENOUS BLD VENIPUNCTURE: CPT | Performed by: PHYSICIAN ASSISTANT

## 2024-11-09 PROCEDURE — 83036 HEMOGLOBIN GLYCOSYLATED A1C: CPT | Performed by: PHYSICIAN ASSISTANT

## 2024-11-09 PROCEDURE — 80053 COMPREHEN METABOLIC PANEL: CPT | Performed by: PHYSICIAN ASSISTANT

## 2024-11-09 PROCEDURE — 82570 ASSAY OF URINE CREATININE: CPT | Performed by: PHYSICIAN ASSISTANT

## 2024-11-09 PROCEDURE — 80061 LIPID PANEL: CPT | Performed by: PHYSICIAN ASSISTANT

## 2024-11-13 ENCOUNTER — OFFICE VISIT (OUTPATIENT)
Dept: ENDOCRINOLOGY | Facility: CLINIC | Age: 54
End: 2024-11-13
Payer: OTHER GOVERNMENT

## 2024-11-13 VITALS
SYSTOLIC BLOOD PRESSURE: 130 MMHG | HEIGHT: 69 IN | BODY MASS INDEX: 41.69 KG/M2 | OXYGEN SATURATION: 97 % | DIASTOLIC BLOOD PRESSURE: 80 MMHG | TEMPERATURE: 99 F | WEIGHT: 281.5 LBS | HEART RATE: 93 BPM

## 2024-11-13 DIAGNOSIS — E11.9 TYPE 2 DIABETES MELLITUS WITHOUT COMPLICATION, WITHOUT LONG-TERM CURRENT USE OF INSULIN: ICD-10-CM

## 2024-11-13 DIAGNOSIS — I10 ESSENTIAL HYPERTENSION: ICD-10-CM

## 2024-11-13 DIAGNOSIS — Z78.0 POSTMENOPAUSAL: ICD-10-CM

## 2024-11-13 DIAGNOSIS — Z79.4 TYPE 2 DIABETES MELLITUS WITH DIABETIC POLYNEUROPATHY, WITH LONG-TERM CURRENT USE OF INSULIN: ICD-10-CM

## 2024-11-13 DIAGNOSIS — E55.9 HYPOVITAMINOSIS D: ICD-10-CM

## 2024-11-13 DIAGNOSIS — E11.42 TYPE 2 DIABETES MELLITUS WITH DIABETIC POLYNEUROPATHY, WITH LONG-TERM CURRENT USE OF INSULIN: ICD-10-CM

## 2024-11-13 DIAGNOSIS — E11.65 TYPE 2 DIABETES MELLITUS WITH HYPERGLYCEMIA, WITH LONG-TERM CURRENT USE OF INSULIN: Primary | ICD-10-CM

## 2024-11-13 DIAGNOSIS — Z79.4 TYPE 2 DIABETES MELLITUS WITH HYPERGLYCEMIA, WITH LONG-TERM CURRENT USE OF INSULIN: Primary | ICD-10-CM

## 2024-11-13 DIAGNOSIS — E04.1 NODULAR THYROID DISEASE: ICD-10-CM

## 2024-11-13 DIAGNOSIS — E11.69 HYPERLIPIDEMIA ASSOCIATED WITH TYPE 2 DIABETES MELLITUS: ICD-10-CM

## 2024-11-13 DIAGNOSIS — E61.1 IRON DEFICIENCY: ICD-10-CM

## 2024-11-13 DIAGNOSIS — E66.01 MORBID OBESITY: ICD-10-CM

## 2024-11-13 DIAGNOSIS — E53.8 VITAMIN B12 DEFICIENCY: ICD-10-CM

## 2024-11-13 DIAGNOSIS — E03.9 HYPOTHYROIDISM, UNSPECIFIED TYPE: ICD-10-CM

## 2024-11-13 DIAGNOSIS — E78.5 HYPERLIPIDEMIA ASSOCIATED WITH TYPE 2 DIABETES MELLITUS: ICD-10-CM

## 2024-11-13 PROCEDURE — 99214 OFFICE O/P EST MOD 30 MIN: CPT | Mod: S$PBB,,, | Performed by: PHYSICIAN ASSISTANT

## 2024-11-13 PROCEDURE — 99999 PR PBB SHADOW E&M-EST. PATIENT-LVL IV: CPT | Mod: PBBFAC,,, | Performed by: PHYSICIAN ASSISTANT

## 2024-11-13 PROCEDURE — 99214 OFFICE O/P EST MOD 30 MIN: CPT | Mod: PBBFAC,PO | Performed by: PHYSICIAN ASSISTANT

## 2024-11-13 RX ORDER — LEVOTHYROXINE SODIUM 125 UG/1
125 TABLET ORAL
Qty: 90 TABLET | Refills: 3 | Status: SHIPPED | OUTPATIENT
Start: 2024-11-13 | End: 2025-11-13

## 2024-11-13 RX ORDER — METFORMIN HYDROCHLORIDE 1000 MG/1
1000 TABLET ORAL 2 TIMES DAILY WITH MEALS
Qty: 180 TABLET | Refills: 3 | Status: SHIPPED | OUTPATIENT
Start: 2024-11-13

## 2024-12-02 ENCOUNTER — PATIENT OUTREACH (OUTPATIENT)
Dept: ADMINISTRATIVE | Facility: HOSPITAL | Age: 54
End: 2024-12-02
Payer: OTHER GOVERNMENT

## 2024-12-02 LAB
HUMAN PAPILLOMAVIRUS (HPV): NORMAL
PAP RECOMMENDATION EXT: NORMAL
PAP SMEAR: NORMAL

## 2024-12-02 NOTE — PROGRESS NOTES
Population Health Chart Review & Patient Outreach Details      Additional [facility] Health Notes:               Updates Requested / Reviewed:      Updated Care Coordination Note         Health Maintenance Topics Overdue:      VB Score: 3     Cervical Cancer Screening  Eye Exam  Mammogram                       Health Maintenance Topic(s) Outreach Outcomes & Actions Taken:    Eye Exam - Outreach Outcomes & Actions Taken  : Left VM for pt to call back regarding her overdue Diabetic eye exam

## 2024-12-18 ENCOUNTER — PATIENT OUTREACH (OUTPATIENT)
Dept: ADMINISTRATIVE | Facility: HOSPITAL | Age: 54
End: 2024-12-18
Payer: OTHER GOVERNMENT

## 2025-02-22 ENCOUNTER — OFFICE VISIT (OUTPATIENT)
Dept: URGENT CARE | Facility: CLINIC | Age: 55
End: 2025-02-22
Payer: OTHER GOVERNMENT

## 2025-02-22 VITALS
RESPIRATION RATE: 20 BRPM | HEART RATE: 91 BPM | OXYGEN SATURATION: 96 % | WEIGHT: 273 LBS | SYSTOLIC BLOOD PRESSURE: 160 MMHG | BODY MASS INDEX: 41.37 KG/M2 | DIASTOLIC BLOOD PRESSURE: 79 MMHG | HEIGHT: 68 IN | TEMPERATURE: 99 F

## 2025-02-22 DIAGNOSIS — M54.32 SCIATICA WITHOUT LUMBAGO, LEFT: ICD-10-CM

## 2025-02-22 DIAGNOSIS — Z86.79 HISTORY OF HYPERTENSION: Primary | ICD-10-CM

## 2025-02-22 DIAGNOSIS — Z86.39 HISTORY OF DIABETES MELLITUS, TYPE II: ICD-10-CM

## 2025-02-22 PROCEDURE — 96372 THER/PROPH/DIAG INJ SC/IM: CPT | Mod: S$GLB,,, | Performed by: NURSE PRACTITIONER

## 2025-02-22 PROCEDURE — 99214 OFFICE O/P EST MOD 30 MIN: CPT | Mod: 25,S$GLB,, | Performed by: NURSE PRACTITIONER

## 2025-02-22 RX ORDER — DICLOFENAC SODIUM 75 MG/1
75 TABLET, DELAYED RELEASE ORAL 2 TIMES DAILY
Qty: 28 TABLET | Refills: 0 | Status: SHIPPED | OUTPATIENT
Start: 2025-02-22 | End: 2025-03-08

## 2025-02-22 RX ORDER — DEXAMETHASONE SODIUM PHOSPHATE 4 MG/ML
8 INJECTION, SOLUTION INTRA-ARTICULAR; INTRALESIONAL; INTRAMUSCULAR; INTRAVENOUS; SOFT TISSUE
Status: COMPLETED | OUTPATIENT
Start: 2025-02-22 | End: 2025-02-22

## 2025-02-22 RX ORDER — KETOROLAC TROMETHAMINE 30 MG/ML
30 INJECTION, SOLUTION INTRAMUSCULAR; INTRAVENOUS
Status: COMPLETED | OUTPATIENT
Start: 2025-02-22 | End: 2025-02-22

## 2025-02-22 RX ADMIN — DEXAMETHASONE SODIUM PHOSPHATE 8 MG: 4 INJECTION, SOLUTION INTRA-ARTICULAR; INTRALESIONAL; INTRAMUSCULAR; INTRAVENOUS; SOFT TISSUE at 05:02

## 2025-02-22 RX ADMIN — KETOROLAC TROMETHAMINE 30 MG: 30 INJECTION, SOLUTION INTRAMUSCULAR; INTRAVENOUS at 05:02

## 2025-02-22 NOTE — PROGRESS NOTES
"Subjective:      Patient ID: Patria Bennett is a 54 y.o. female.    Vitals:  height is 5' 8" (1.727 m) and weight is 123.8 kg (273 lb). Her oral temperature is 98.7 °F (37.1 °C). Her blood pressure is 160/79 (abnormal) and her pulse is 91. Her respiration is 20 and oxygen saturation is 96%.     Chief Complaint: Leg Pain    Return from cruise today.  States she started having left leg pain proximally 4-5 days ago while on the cruise and has been experiencing pain intermittently up and down back of her left leg and outer side of her calf.  Denies injury or trauma.  Denies known precipitating event or occurrence.      Leg Pain   The incident occurred 5 to 7 days ago. Incident location: On cruise. There was no injury mechanism. The pain is present in the right leg and left leg. The pain is at a severity of 6/10. The pain is mild. The pain has been Fluctuating since onset. Associated symptoms include an inability to bear weight. Pertinent negatives include no numbness. She reports no foreign bodies present. The symptoms are aggravated by movement. She has tried acetaminophen and NSAIDs for the symptoms. The treatment provided no relief.       Musculoskeletal:  Positive for pain (Left leg radiating active thigh and calf intermittently). Negative for trauma and back pain.   Skin:  Negative for rash, erythema and bruising.   Neurological:  Negative for numbness and tingling.      Objective:     Physical Exam   Constitutional: She is oriented to person, place, and time.  Non-toxic appearance. She does not appear ill. No distress. obesity  HENT:   Head: Normocephalic and atraumatic.   Nose: Nose normal.   Mouth/Throat: Mucous membranes are moist.   Eyes: Conjunctivae are normal.   Cardiovascular: Normal rate and regular rhythm.   Pulmonary/Chest: Effort normal. No respiratory distress.   Abdominal: Normal appearance.   Neurological: no focal deficit. She is alert and oriented to person, place, and time.   Skin: Skin is warm, " dry, not diaphoretic and no rash. Capillary refill takes 2 to 3 seconds. No bruising, No erythema and No lesion   Psychiatric: Her behavior is normal. Mood normal.   Nursing note and vitals reviewed.      Assessment:     1. History of hypertension    2. History of diabetes mellitus, type II    3. Sciatica without lumbago, left      Last A1c 7.4  Last GFR greater than 60    Plan:       History of hypertension    History of diabetes mellitus, type II    Sciatica without lumbago, left  -     ketorolac injection 30 mg  -     dexAMETHasone injection 8 mg  -     diclofenac (VOLTAREN) 75 MG EC tablet; Take 1 tablet (75 mg total) by mouth 2 (two) times daily. for 14 days  Dispense: 28 tablet; Refill: 0

## 2025-02-22 NOTE — PATIENT INSTRUCTIONS
Start diclofenac tomorrow and take as prescribed.  Always take with food  Do not take any other anti-inflammatories or NSAIDs over-the-counter or prescribed while taking diclofenac    If symptoms do not improve by this evening then please go to emergency department tonight for further evaluation of symptoms    Please contact your primary care doctor on Monday with report of symptoms response to medications and further guidance in this matter

## 2025-03-13 ENCOUNTER — LAB VISIT (OUTPATIENT)
Dept: LAB | Facility: HOSPITAL | Age: 55
End: 2025-03-13
Attending: PHYSICIAN ASSISTANT
Payer: OTHER GOVERNMENT

## 2025-03-13 DIAGNOSIS — E53.8 VITAMIN B12 DEFICIENCY: ICD-10-CM

## 2025-03-13 DIAGNOSIS — E55.9 HYPOVITAMINOSIS D: ICD-10-CM

## 2025-03-13 DIAGNOSIS — E61.1 IRON DEFICIENCY: ICD-10-CM

## 2025-03-13 DIAGNOSIS — E11.65 TYPE 2 DIABETES MELLITUS WITH HYPERGLYCEMIA, WITH LONG-TERM CURRENT USE OF INSULIN: ICD-10-CM

## 2025-03-13 DIAGNOSIS — Z79.4 TYPE 2 DIABETES MELLITUS WITH HYPERGLYCEMIA, WITH LONG-TERM CURRENT USE OF INSULIN: ICD-10-CM

## 2025-03-13 LAB
25(OH)D3+25(OH)D2 SERPL-MCNC: 45 NG/ML (ref 30–96)
ALBUMIN/CREAT UR: 6 UG/MG (ref 0–30)
CREAT UR-MCNC: 83.8 MG/DL (ref 15–325)
ESTIMATED AVG GLUCOSE: 180 MG/DL (ref 68–131)
HBA1C MFR BLD: 7.9 % (ref 4.5–6.2)
IRON SERPL-MCNC: 49 UG/DL (ref 30–160)
MICROALBUMIN UR DL<=1MG/L-MCNC: 5 UG/ML (ref 0–4999.9)
SATURATED IRON: 15 % (ref 20–50)
T4 FREE SERPL-MCNC: 1.03 NG/DL (ref 0.71–1.51)
TOTAL IRON BINDING CAPACITY: 336 UG/DL (ref 250–450)
TRANSFERRIN SERPL-MCNC: 240 MG/DL (ref 200–375)
TSH SERPL DL<=0.005 MIU/L-ACNC: 2.05 UIU/ML (ref 0.4–4)
VIT B12 SERPL-MCNC: 497 PG/ML (ref 210–950)

## 2025-03-13 PROCEDURE — 82306 VITAMIN D 25 HYDROXY: CPT | Performed by: PHYSICIAN ASSISTANT

## 2025-03-13 PROCEDURE — 82043 UR ALBUMIN QUANTITATIVE: CPT | Performed by: PHYSICIAN ASSISTANT

## 2025-03-13 PROCEDURE — 36415 COLL VENOUS BLD VENIPUNCTURE: CPT | Performed by: PHYSICIAN ASSISTANT

## 2025-03-13 PROCEDURE — 84443 ASSAY THYROID STIM HORMONE: CPT | Performed by: PHYSICIAN ASSISTANT

## 2025-03-13 PROCEDURE — 84439 ASSAY OF FREE THYROXINE: CPT | Performed by: PHYSICIAN ASSISTANT

## 2025-03-13 PROCEDURE — 82607 VITAMIN B-12: CPT | Performed by: PHYSICIAN ASSISTANT

## 2025-03-13 PROCEDURE — 83540 ASSAY OF IRON: CPT | Performed by: PHYSICIAN ASSISTANT

## 2025-03-13 PROCEDURE — 83036 HEMOGLOBIN GLYCOSYLATED A1C: CPT | Performed by: PHYSICIAN ASSISTANT

## 2025-03-17 ENCOUNTER — OFFICE VISIT (OUTPATIENT)
Dept: ENDOCRINOLOGY | Facility: CLINIC | Age: 55
End: 2025-03-17
Payer: OTHER GOVERNMENT

## 2025-03-17 VITALS
DIASTOLIC BLOOD PRESSURE: 80 MMHG | WEIGHT: 272.5 LBS | HEIGHT: 68 IN | SYSTOLIC BLOOD PRESSURE: 118 MMHG | BODY MASS INDEX: 41.3 KG/M2 | OXYGEN SATURATION: 97 % | HEART RATE: 87 BPM | TEMPERATURE: 98 F

## 2025-03-17 DIAGNOSIS — E11.9 TYPE 2 DIABETES MELLITUS WITHOUT COMPLICATION, WITHOUT LONG-TERM CURRENT USE OF INSULIN: Primary | ICD-10-CM

## 2025-03-17 DIAGNOSIS — E66.01 MORBID OBESITY: ICD-10-CM

## 2025-03-17 DIAGNOSIS — E55.9 HYPOVITAMINOSIS D: ICD-10-CM

## 2025-03-17 DIAGNOSIS — H66.90 OTITIS MEDIA, UNSPECIFIED LATERALITY, UNSPECIFIED OTITIS MEDIA TYPE: ICD-10-CM

## 2025-03-17 DIAGNOSIS — Z79.4 TYPE 2 DIABETES MELLITUS WITH DIABETIC POLYNEUROPATHY, WITH LONG-TERM CURRENT USE OF INSULIN: ICD-10-CM

## 2025-03-17 DIAGNOSIS — I10 ESSENTIAL HYPERTENSION: ICD-10-CM

## 2025-03-17 DIAGNOSIS — E78.2 MIXED HYPERLIPIDEMIA: ICD-10-CM

## 2025-03-17 DIAGNOSIS — E11.42 TYPE 2 DIABETES MELLITUS WITH DIABETIC POLYNEUROPATHY, WITH LONG-TERM CURRENT USE OF INSULIN: ICD-10-CM

## 2025-03-17 DIAGNOSIS — E11.9 TYPE 2 DIABETES MELLITUS WITHOUT COMPLICATION, WITH LONG-TERM CURRENT USE OF INSULIN: ICD-10-CM

## 2025-03-17 DIAGNOSIS — Z79.4 TYPE 2 DIABETES MELLITUS WITHOUT COMPLICATION, WITH LONG-TERM CURRENT USE OF INSULIN: ICD-10-CM

## 2025-03-17 PROCEDURE — G2211 COMPLEX E/M VISIT ADD ON: HCPCS | Mod: S$PBB,,, | Performed by: PHYSICIAN ASSISTANT

## 2025-03-17 PROCEDURE — 99215 OFFICE O/P EST HI 40 MIN: CPT | Mod: PBBFAC,PO | Performed by: PHYSICIAN ASSISTANT

## 2025-03-17 PROCEDURE — 99214 OFFICE O/P EST MOD 30 MIN: CPT | Mod: S$PBB,,, | Performed by: PHYSICIAN ASSISTANT

## 2025-03-17 PROCEDURE — 99999 PR PBB SHADOW E&M-EST. PATIENT-LVL V: CPT | Mod: PBBFAC,,, | Performed by: PHYSICIAN ASSISTANT

## 2025-03-17 RX ORDER — TIRZEPATIDE 12.5 MG/.5ML
12.5 INJECTION, SOLUTION SUBCUTANEOUS
Qty: 12 PEN | Refills: 3 | Status: SHIPPED | OUTPATIENT
Start: 2025-03-17

## 2025-03-17 RX ORDER — ROSUVASTATIN CALCIUM 40 MG/1
40 TABLET, COATED ORAL DAILY
Qty: 90 TABLET | Refills: 3 | Status: SHIPPED | OUTPATIENT
Start: 2025-03-17 | End: 2026-03-17

## 2025-03-17 RX ORDER — AMOXICILLIN AND CLAVULANATE POTASSIUM 875; 125 MG/1; MG/1
1 TABLET, FILM COATED ORAL EVERY 12 HOURS
Qty: 14 TABLET | Refills: 0 | Status: SHIPPED | OUTPATIENT
Start: 2025-03-17

## 2025-03-17 NOTE — PROGRESS NOTES
CC: This 54 y.o. female presents for management of Diabetes Mellitus  and chronic conditions pending review including HTN, HLP    HPI: was diagnosed with T2DM in >10 years on lab work.   Has never been hospitalized r/t DM.  Family hx of DM: parents, aunts, uncles  Fhx of thyroid disease: gf had thyroid cancer, aunts,   Denies missing doses of DM medication.   hypoglycemia at home: none  monitoring BG at home:  Not checking    Diet:   1 meal daily.  Avoids sugary beverages.     Exercise: none. Active w/ two grandkids during the week.    CURRENT DM MEDS: Mounjaro 10 mg weekly, Metformin 1000 qd, Jardiance 25 mg daily      Standards of Care:  Eye exam:10/24  Podiatry exam: 9/23 Follows w/ Dr. Moreno  DE: last year    DEXA scan: 7/24 wnl.   On ergo 3x weekly.    MNG  S/p left thyroidectomy 12/21-benign  Thyroid u/s 6/24 shows a 2.3 cm mass in the right thyroid.   + occ sob and hoarseness. No dysphagia.   FNAs were benign of both nodules 6/21.     Hypothyroidism  Synthroid 125 mcg qd  + constipation, heat intolerance, anxiety, wt loss, fatigue.  No diarrhea, hair loss, fatigue or tremors.    PMHx, PSHx: reviewed in epic.  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     Wt Readings from Last 6 Encounters:   03/17/25 123.6 kg (272 lb 7.8 oz)   02/22/25 123.8 kg (273 lb)   11/13/24 127.7 kg (281 lb 8.4 oz)   10/28/24 123.2 kg (271 lb 11.2 oz)   07/18/24 123 kg (271 lb 2.7 oz)   07/09/24 121.6 kg (268 lb)      ROS:   Gen: Appetite good, + wt loss 11 lbs, denies fatigue and weakness.  Skin: Skin is intact and heals well, no rashes, no hair changes  Eyes: Denies visual disturbances  Ears: right ear pain  Resp: no SOB or AGUILAR, no cough  Cardiac: No palpitations, chest pain, no edema   GI:  No nausea or vomiting, diarrhea, constipation, or abdominal pain.  /GYN:+ night sweats, No nocturia, burning or pain.   MS/Neuro: + knee pain, Denies numbness/ tingling in BLE; Gait steady, speech clear  Psych: Denies drug/ETOH abuse, no hx  "of depression.  Other systems: negative.    /80 (BP Location: Right forearm, Patient Position: Sitting)   Pulse 87   Temp 98.4 °F (36.9 °C) (Oral)   Ht 5' 8" (1.727 m)   Wt 123.6 kg (272 lb 7.8 oz)   SpO2 97%   BMI 41.43 kg/m²      PE:  GENERAL: middle aged female,well developed, well nourished.  EAR: +bulging tm  PSYCH: AAOx3, appropriate mood and affect, pleasant expression, conversant, appears relaxed, well groomed.   EYES: Conjunctiva, corneas clear  CHEST: Resp even and unlabored,   SKIN: Skin warm and dry no acanthosis nigracans.  11/24  Foot Exam: no sores or macerations noted.     Protective Sensation (w/ 10 gram monofilament):  Right: Intact  Left: Intact    Visual Inspection:  Normal -  Bilateral, Nails Intact - without Evidence of Foot Deformity- Bilateral, and Dry Skin -  Bilateral    Pedal Pulses:   Right: Present  Left: Present    Posterior Tibialis Pulses:   Right:Present  Left: Present     Vibratory Sensation  Right:Positive  Left:Positive   Personally reviewed labs below:    Lab Visit on 03/13/2025   Component Date Value Ref Range Status    Hemoglobin A1C 03/13/2025 7.9 (H)  4.5 - 6.2 % Final    Comment: ADA Screening Guidelines:  5.7-6.4%  Consistent with prediabetes  >or=6.5%  Consistent with diabetes    High levels of fetal hemoglobin interfere with the HbA1C  assay. Heterozygous hemoglobin variants (HbS, HgC, etc)do  not significantly interfere with this assay.   However, presence of multiple variants may affect accuracy.      Estimated Avg Glucose 03/13/2025 180 (H)  68 - 131 mg/dL Final    Vit D, 25-Hydroxy 03/13/2025 45  30 - 96 ng/mL Final    Comment: Vitamin D deficiency.........<10 ng/mL                              Vitamin D insufficiency......10-29 ng/mL       Vitamin D sufficiency........> or equal to 30 ng/mL  Vitamin D toxicity............>100 ng/mL      Microalbumin, Urine 03/13/2025 5.0  0.0 - 4999.9 ug/mL Final    Creatinine, Urine 03/13/2025 83.8  15.0 - 325.0 mg/dL " Final    Microalb/Creat Ratio 03/13/2025 6.0  0.0 - 30.0 ug/mg Final    Free T4 03/13/2025 1.03  0.71 - 1.51 ng/dL Final    TSH 03/13/2025 2.051  0.400 - 4.000 uIU/mL Final    Iron 03/13/2025 49  30 - 160 ug/dL Final    Transferrin 03/13/2025 240  200 - 375 mg/dL Final    TIBC 03/13/2025 336  250 - 450 ug/dL Final    Saturated Iron 03/13/2025 15 (L)  20 - 50 % Final    Vitamin B-12 03/13/2025 497  210 - 950 pg/mL Final       ASSESSMENT and PLAN:    1. Type 2 diabetes mellitus without complication, without long-term current use of insulin  tirzepatide (MOUNJARO) 12.5 mg/0.5 mL PnIj    Lipid Panel    rosuvastatin (CRESTOR) 40 MG Tab    TSH    T4, Free    Hemoglobin A1C    Lipid Panel    Renal Function Panel      2. Type 2 diabetes mellitus with diabetic polyneuropathy, with long-term current use of insulin        3. Essential hypertension        4. Mixed hyperlipidemia        5. Morbid obesity        6. Hypovitaminosis D        7. Otitis media, unspecified laterality, unspecified otitis media type  amoxicillin-clavulanate 875-125mg (AUGMENTIN) 875-125 mg per tablet      8. Type 2 diabetes mellitus without complication, with long-term current use of insulin  rosuvastatin (CRESTOR) 40 MG Tab        T2DM with hyperglycemia, neuropathy-  A1c is elevated.   Increase Mounjaro to 12.5 mg weekly.  Continue metformin & jardiance.    Discussed A1c and BG goals.  Advised to have an eye exam.   Reviewed  hypoglycemia, s/s and appropriate tx.   Instructed to monitor BG and bring meter/ log to every clinic visit.   - takes ASA, ACEi, statin  Neuropathy- worsening-increase gabapentin to 400 mg bid.  HTN -stable, continue meds as previously prescribed and monitor.   HLP -elevated  LDL , Could have increased from bs. Recheck next time. Continue crestor to 40 mg daily, LFTs WNL. Not fasting on last test. Will add zetia if still elevated.  Obesity-Body mass index is 41.43 kg/m². Continue exercise and diet.  Hypovitaminosis d-low last  time-continue ergocalciferol to 3x weekly.   Postmenopausal-dexa scan 7/26.  Vitamin B 12 agmtircrzh-gljdan-hzquroeb injections.  Nodular thyroid disease- S/p left thyroidectomy. Repeat u/s in 6/25.  Bjzmeccyobvjtf-zjizsu-lpiivfav synthroid to 125 mcg qd.  Iron deficiency-stable-monitor-continue iron.  Otitis media-start Augmentin. F/u w/ PCP if sx worsen.    Follow-up:    4 months with fasting lab prior -A1c, tfts,lp, rp & us

## 2025-04-01 ENCOUNTER — OFFICE VISIT (OUTPATIENT)
Dept: URGENT CARE | Facility: CLINIC | Age: 55
End: 2025-04-01
Payer: OTHER GOVERNMENT

## 2025-04-01 VITALS
SYSTOLIC BLOOD PRESSURE: 138 MMHG | TEMPERATURE: 98 F | DIASTOLIC BLOOD PRESSURE: 81 MMHG | RESPIRATION RATE: 20 BRPM | OXYGEN SATURATION: 98 % | HEIGHT: 68 IN | HEART RATE: 90 BPM | BODY MASS INDEX: 41.22 KG/M2 | WEIGHT: 272 LBS

## 2025-04-01 DIAGNOSIS — R21 RASH: Primary | ICD-10-CM

## 2025-04-01 RX ORDER — FAMOTIDINE 20 MG/1
20 TABLET, FILM COATED ORAL DAILY
Qty: 5 TABLET | Refills: 0 | Status: SHIPPED | OUTPATIENT
Start: 2025-04-01

## 2025-04-01 RX ORDER — CETIRIZINE HYDROCHLORIDE 10 MG/1
10 TABLET ORAL DAILY
Qty: 14 TABLET | Refills: 0 | Status: SHIPPED | OUTPATIENT
Start: 2025-04-01 | End: 2025-04-15

## 2025-04-01 NOTE — PROGRESS NOTES
"Subjective:      Patient ID: Patria Bennett is a 54 y.o. female.    Vitals:  height is 5' 8" (1.727 m) and weight is 123.4 kg (272 lb). Her temperature is 98.3 °F (36.8 °C). Her blood pressure is 138/81 and her pulse is 90. Her respiration is 20 and oxygen saturation is 98%.     Chief Complaint: Rash    Patient is a 53 yo female who presents to clinic today for evaluation of rash that comes and goes for the past two days. Pt states she will break out in welps that will itch and then go away. Pt reports she completed antibiotics last week for an ear infection, but that is the only thing she had done differently. Pt states she has not changed soaps, laundry detergent, perfumes, lotions etc. Pt reports she has taken benedryl for sx. Relief. Pt also wants to get ear checked to make sure infection cleared.     Rash  This is a new problem. The current episode started yesterday. The problem has been gradually worsening since onset. The affected locations include the left elbow and right arm. The rash is characterized by redness and itchiness. Pertinent negatives include no congestion, cough, diarrhea, fatigue, fever, shortness of breath, sore throat or vomiting.   Otalgia   There is pain in the left ear. This is a new problem. Associated symptoms include a rash. Pertinent negatives include no abdominal pain, coughing, diarrhea, headaches, sore throat or vomiting. She has tried antibiotics for the symptoms.       Constitution: Negative for chills, sweating, fatigue and fever.   HENT:  Positive for ear pain. Negative for congestion and sore throat.    Neck: neck negative.   Cardiovascular: Negative.  Negative for chest pain and palpitations.   Eyes: Negative.    Respiratory: Negative.  Negative for chest tightness, cough and shortness of breath.    Gastrointestinal: Negative.  Negative for abdominal pain, nausea, vomiting and diarrhea.   Endocrine: negative.   Genitourinary: Negative.    Musculoskeletal: Negative.  Negative " for muscle ache.   Skin:  Positive for rash. Negative for color change, pale and erythema.   Allergic/Immunologic: Negative.    Neurological: Negative.  Negative for dizziness, light-headedness, passing out, headaches, disorientation and altered mental status.   Hematologic/Lymphatic: Negative.    Psychiatric/Behavioral: Negative.  Negative for altered mental status, disorientation and confusion.       Objective:     Physical Exam   Constitutional: She is oriented to person, place, and time. She appears well-developed. She is cooperative.  Non-toxic appearance. She does not appear ill. No distress.   HENT:   Head: Normocephalic and atraumatic.   Ears:   Right Ear: Hearing, tympanic membrane, external ear and ear canal normal.   Left Ear: Hearing, tympanic membrane, external ear and ear canal normal.   Nose: Nose normal. No mucosal edema, rhinorrhea, nasal deformity or congestion. No epistaxis. Right sinus exhibits no maxillary sinus tenderness and no frontal sinus tenderness. Left sinus exhibits no maxillary sinus tenderness and no frontal sinus tenderness.   Mouth/Throat: Uvula is midline, oropharynx is clear and moist and mucous membranes are normal. Mucous membranes are moist. No trismus in the jaw. Normal dentition. No uvula swelling. No oropharyngeal exudate or posterior oropharyngeal erythema. Oropharynx is clear.   Eyes: Conjunctivae and lids are normal. Pupils are equal, round, and reactive to light. Right eye exhibits no discharge. Left eye exhibits no discharge. No scleral icterus.   Neck: Trachea normal and phonation normal. Neck supple. No neck rigidity present.   Cardiovascular: Normal rate, regular rhythm, normal heart sounds and normal pulses.   Pulmonary/Chest: Effort normal and breath sounds normal. No respiratory distress. She has no wheezes. She has no rhonchi. She has no rales.   Abdominal: Normal appearance and bowel sounds are normal. She exhibits no distension. Soft. There is no abdominal  tenderness.   Musculoskeletal: Normal range of motion.         General: Normal range of motion.      Cervical back: She exhibits no tenderness.   Lymphadenopathy:     She has no cervical adenopathy.   Neurological: She is alert and oriented to person, place, and time. She exhibits normal muscle tone.   Skin: Skin is warm, dry, intact, not diaphoretic, not pale and no rash. Capillary refill takes less than 2 seconds. No erythema   Psychiatric: Her speech is normal and behavior is normal. Judgment and thought content normal.   Nursing note and vitals reviewed.      Assessment:     1. Rash        Plan:       Rash    Other orders  -     cetirizine (ZYRTEC) 10 MG tablet; Take 1 tablet (10 mg total) by mouth once daily. for 14 days  Dispense: 14 tablet; Refill: 0  -     famotidine (PEPCID) 20 MG tablet; Take 1 tablet (20 mg total) by mouth once daily.  Dispense: 5 tablet; Refill: 0                  Rash not present at current, pt did show pictures of rash noted to bilateral wrist.  Pt refused steroid injection due to having one 2 months ago and it elevating blood sugar.   Continue benedryl nightly.   May use OTC steroid cream bid.   Medications as prescribed.

## 2025-04-02 ENCOUNTER — HOSPITAL ENCOUNTER (EMERGENCY)
Facility: HOSPITAL | Age: 55
Discharge: HOME | End: 2025-04-02
Attending: EMERGENCY MEDICINE
Payer: OTHER GOVERNMENT

## 2025-04-02 VITALS
BODY MASS INDEX: 41.05 KG/M2 | HEART RATE: 68 BPM | DIASTOLIC BLOOD PRESSURE: 67 MMHG | TEMPERATURE: 98 F | WEIGHT: 270 LBS | RESPIRATION RATE: 17 BRPM | SYSTOLIC BLOOD PRESSURE: 138 MMHG | OXYGEN SATURATION: 96 %

## 2025-04-02 DIAGNOSIS — R07.9 CHEST PAIN: ICD-10-CM

## 2025-04-02 LAB
ABSOLUTE EOSINOPHIL (SMH): 0.1 K/UL
ABSOLUTE MONOCYTE (SMH): 0.53 K/UL (ref 0.3–1)
ABSOLUTE NEUTROPHIL COUNT (SMH): 2.6 K/UL (ref 1.8–7.7)
ANION GAP (SMH): 6 MMOL/L (ref 8–16)
BASOPHILS # BLD AUTO: 0.02 K/UL
BASOPHILS NFR BLD AUTO: 0.4 %
BUN SERPL-MCNC: 16 MG/DL (ref 6–20)
CALCIUM SERPL-MCNC: 8.9 MG/DL (ref 8.7–10.5)
CHLORIDE SERPL-SCNC: 106 MMOL/L (ref 95–110)
CO2 SERPL-SCNC: 24 MMOL/L (ref 23–29)
CREAT SERPL-MCNC: 0.8 MG/DL (ref 0.5–1.4)
ERYTHROCYTE [DISTWIDTH] IN BLOOD BY AUTOMATED COUNT: 13.7 % (ref 11.5–14.5)
GFR SERPLBLD CREATININE-BSD FMLA CKD-EPI: >60 ML/MIN/1.73/M2
GLUCOSE SERPL-MCNC: 175 MG/DL (ref 70–110)
HCT VFR BLD AUTO: 42.3 % (ref 37–48.5)
HGB BLD-MCNC: 13.4 GM/DL (ref 12–16)
IMM GRANULOCYTES # BLD AUTO: 0.01 K/UL (ref 0–0.04)
IMM GRANULOCYTES NFR BLD AUTO: 0.2 % (ref 0–0.5)
LYMPHOCYTES # BLD AUTO: 2.26 K/UL (ref 1–4.8)
MCH RBC QN AUTO: 26.3 PG (ref 27–31)
MCHC RBC AUTO-ENTMCNC: 31.7 G/DL (ref 32–36)
MCV RBC AUTO: 83 FL (ref 82–98)
NUCLEATED RBC (/100WBC) (SMH): 0 /100 WBC
OHS QRS DURATION: 90 MS
OHS QTC CALCULATION: 474 MS
PLATELET # BLD AUTO: 265 K/UL (ref 150–450)
PMV BLD AUTO: 9.5 FL (ref 9.2–12.9)
POTASSIUM SERPL-SCNC: 3.5 MMOL/L (ref 3.5–5.1)
RBC # BLD AUTO: 5.09 M/UL (ref 4–5.4)
RELATIVE EOSINOPHIL (SMH): 1.8 % (ref 0–8)
RELATIVE LYMPHOCYTE (SMH): 40.6 % (ref 18–48)
RELATIVE MONOCYTE (SMH): 9.5 % (ref 4–15)
RELATIVE NEUTROPHIL (SMH): 47.5 % (ref 38–73)
SODIUM SERPL-SCNC: 136 MMOL/L (ref 136–145)
TROPONIN HIGH SENSITIVE (SMH): <2.3 PG/ML
WBC # BLD AUTO: 5.56 K/UL (ref 3.9–12.7)

## 2025-04-02 PROCEDURE — 99285 EMERGENCY DEPT VISIT HI MDM: CPT | Mod: 25

## 2025-04-02 PROCEDURE — 85025 COMPLETE CBC W/AUTO DIFF WBC: CPT | Performed by: EMERGENCY MEDICINE

## 2025-04-02 PROCEDURE — 25000003 PHARM REV CODE 250: Performed by: EMERGENCY MEDICINE

## 2025-04-02 PROCEDURE — 36415 COLL VENOUS BLD VENIPUNCTURE: CPT | Performed by: EMERGENCY MEDICINE

## 2025-04-02 PROCEDURE — 93005 ELECTROCARDIOGRAM TRACING: CPT | Performed by: INTERNAL MEDICINE

## 2025-04-02 PROCEDURE — 82310 ASSAY OF CALCIUM: CPT | Performed by: EMERGENCY MEDICINE

## 2025-04-02 PROCEDURE — 93010 ELECTROCARDIOGRAM REPORT: CPT | Mod: ,,, | Performed by: INTERNAL MEDICINE

## 2025-04-02 PROCEDURE — 84484 ASSAY OF TROPONIN QUANT: CPT | Performed by: EMERGENCY MEDICINE

## 2025-04-02 RX ORDER — ALUMINUM HYDROXIDE, MAGNESIUM HYDROXIDE, AND SIMETHICONE 1200; 120; 1200 MG/30ML; MG/30ML; MG/30ML
30 SUSPENSION ORAL
Status: COMPLETED | OUTPATIENT
Start: 2025-04-02 | End: 2025-04-02

## 2025-04-02 RX ORDER — FAMOTIDINE 20 MG/1
20 TABLET, FILM COATED ORAL
Status: COMPLETED | OUTPATIENT
Start: 2025-04-02 | End: 2025-04-02

## 2025-04-02 RX ADMIN — ALUMINUM HYDROXIDE, MAGNESIUM HYDROXIDE, AND SIMETHICONE 30 ML: 200; 200; 20 SUSPENSION ORAL at 03:04

## 2025-04-02 RX ADMIN — FAMOTIDINE 20 MG: 20 TABLET, FILM COATED ORAL at 03:04

## 2025-04-02 NOTE — ED PROVIDER NOTES
Chief complaint:  Chest Pain (Started yesterday got worse today. )      HPI:  Patria Bennett is a 54 y.o. female with a history of hypertension, hyperlipidemia, diabetes, obesity, GERD presenting with intermittent sternal, burning chest pain.  She states this feels slightly different than her reflux and took Linzess and drank water without relief.  She denies relation to eating, position.  No emesis.  No relation to exertion.  No associated dyspnea or pleuritic pain.  No diaphoresis.  No abdominal pain.  Symptoms are currently relieved.    ROS: As per HPI and below:  No blood in the stools, dark stools, fever, cough, pleuritic pain, syncope.    Review of patient's allergies indicates:  No Known Allergies    Discharge Medication List as of 4/2/2025  4:45 PM        CONTINUE these medications which have NOT CHANGED    Details   amoxicillin-clavulanate 875-125mg (AUGMENTIN) 875-125 mg per tablet Take 1 tablet by mouth every 12 (twelve) hours., Starting Mon 3/17/2025, Normal      azelastine (ASTELIN) 137 mcg (0.1 %) nasal spray 2 sprays (274 mcg total) by Nasal route 2 (two) times daily., Starting Tue 7/9/2024, Until Wed 7/9/2025, Normal      blood sugar diagnostic (BLOOD GLUCOSE TEST) Strp Check 1x daily. Insurance preferred., Normal      blood-glucose meter kit Use as instructed. Insurance preferred., Normal      !! cetirizine (ZYRTEC) 10 MG tablet Take 1 tablet (10 mg total) by mouth once daily., Starting Mon 10/28/2024, Until Tue 10/28/2025, Normal      !! cetirizine (ZYRTEC) 10 MG tablet Take 1 tablet (10 mg total) by mouth once daily. for 14 days, Starting Tue 4/1/2025, Until Tue 4/15/2025, Normal      clonazePAM (KLONOPIN) 1 MG tablet Take 1 mg by mouth 2 (two) times daily as needed for Anxiety., Historical Med      cyanocobalamin, vitamin B-12, (B-12 COMPLIANCE) 1,000 mcg/mL Kit Inject 1,000 mcg as directed once a week., Historical Med      cyclobenzaprine (FLEXERIL) 10 MG tablet Take 1 tablet (10 mg total) by  mouth 2 (two) times daily as needed for Muscle spasms., Starting Mon 10/28/2024, Normal      doxepin (SINEQUAN) 10 MG capsule Take 1 capsule (10 mg total) by mouth nightly as needed (insomnia)., Starting Mon 10/28/2024, Until Tue 10/28/2025 at 2359, Normal      DULoxetine (CYMBALTA) 60 MG capsule Take 1 capsule (60 mg total) by mouth once daily., Starting Fri 3/22/2024, Normal      empagliflozin (JARDIANCE) 25 mg tablet Take 1 tablet (25 mg total) by mouth every morning., Starting Thu 7/18/2024, Normal      ergocalciferol (ERGOCALCIFEROL) 50,000 unit Cap Take one capsule 3x a week., Normal      famotidine (PEPCID) 20 MG tablet Take 1 tablet (20 mg total) by mouth once daily., Starting Tue 4/1/2025, Normal      fluconazole (DIFLUCAN) 200 MG Tab Take 1 tablet (200 mg total) by mouth as needed (Symptoms). Repeat in one week is symptoms persist., Starting Mon 10/28/2024, Normal      gabapentin (NEURONTIN) 400 MG capsule Take 1 capsule (400 mg total) by mouth 2 (two) times daily., Starting Thu 7/18/2024, Until Fri 7/18/2025, Normal      linaCLOtide (LINZESS) 290 mcg Cap capsule Take 1 capsule (290 mcg total) by mouth before breakfast., Starting Mon 10/28/2024, Normal      metFORMIN (GLUCOPHAGE) 1000 MG tablet Take 1 tablet (1,000 mg total) by mouth 2 (two) times daily with meals., Starting Wed 11/13/2024, Normal      pantoprazole (PROTONIX) 40 MG tablet Take 1 tablet (40 mg total) by mouth once daily., Starting Wed 11/29/2023, Normal      rosuvastatin (CRESTOR) 40 MG Tab Take 1 tablet (40 mg total) by mouth once daily., Starting Mon 3/17/2025, Until Tue 3/17/2026, Normal      SYNTHROID 125 mcg tablet Take 1 tablet (125 mcg total) by mouth before breakfast., Starting Wed 11/13/2024, Until Thu 11/13/2025, Normal      tirzepatide (MOUNJARO) 12.5 mg/0.5 mL PnIj Inject 12.5 mg into the skin every 7 days., Starting Mon 3/17/2025, Normal      valsartan-hydrochlorothiazide (DIOVAN-HCT) 160-25 mg per tablet Take 1 tablet by mouth  once daily., Starting Mon 10/28/2024, Normal       !! - Potential duplicate medications found. Please discuss with provider.          PMH:  As per HPI and below:  Past Medical History:   Diagnosis Date    Acid reflux     Acute hypoxemic respiratory failure 3/24/2020    Depression     Diabetes mellitus, type 2     Encounter for blood transfusion     Hypertension     Iron deficiency     Neuropathy     Vitamin B 12 deficiency      Past Surgical History:   Procedure Laterality Date    COLONOSCOPY  02/01/2022    REDUCTION OF BOTH BREASTS      THYROIDECTOMY, PARTIAL Left 12/02/2021    Procedure: THYROIDECTOMY-SUBTOTAL;  Surgeon: iNshant William MD;  Location: [facility];  Service: ENT;  Laterality: Left;    TONSILLECTOMY         Social History     Socioeconomic History    Marital status:    Tobacco Use    Smoking status: Never    Smokeless tobacco: Never   Substance and Sexual Activity    Alcohol use: Yes     Comment: social , not monthly    Drug use: Never       Family History   Problem Relation Name Age of Onset    COPD Mother      Diabetes Mother      Seizures Mother      Hypertension Mother      Heart attack Father      Breast cancer Paternal Grandmother         Physical Exam:    Vitals:    04/02/25 1632   BP: 138/67   Pulse: 68   Resp:    Temp:      GENERAL:  No apparent distress.  Alert.  Large body habitus.  HEENT:  Moist mucous membranes.  Normocephalic and atraumatic.    NECK:  No swelling.  Midline trachea.   CARDIOVASCULAR:  Regular rate and rhythm.  2+ radial pulses.  No murmur auscultated.  PULMONARY:  Lungs clear to auscultation bilaterally.  No wheezes, rales, or rhonci.    ABDOMEN:  Non-tender and non-distended.    EXTREMITIES:  Warm and well perfused.  Brisk capillary refill.  No peripheral edema.  Legs are symmetric and nontender to palpation.  NEUROLOGICAL:  Normal mental status.  Appropriate and conversant.    SKIN:  No rashes or ecchymoses.      Labs Reviewed   BASIC METABOLIC PANEL - Abnormal        Result Value    Sodium 136      Potassium 3.5      Chloride 106      CO2 24      Glucose 175 (*)     BUN 16      Creatinine 0.8      Calcium 8.9      Anion Gap 6 (*)     eGFR >60     CBC WITH DIFFERENTIAL - Abnormal    WBC 5.56      RBC 5.09      Hgb 13.4      Hct 42.3      MCV 83      MCH 26.3 (*)     MCHC 31.7 (*)     RDW 13.7      Platelet Count 265      MPV 9.5      Nucleated RBC 0      Neut % 47.5      Lymph % 40.6      Mono % 9.5      Eos % 1.8      Basophil % 0.4      Imm Grans % 0.2      Neut # 2.6      Lymph # 2.26      Mono # 0.53      Eos # 0.10      Baso # 0.02      Imm Grans # 0.01     TROPONIN I HIGH SENSITIVITY - Normal    Troponin High Sensitive <2.3     CBC W/ AUTO DIFFERENTIAL    Narrative:     The following orders were created for panel order CBC auto differential.  Procedure                               Abnormality         Status                     ---------                               -----------         ------                     CBC with Differential[[id number]]       Abnormal            Final result                 Please view results for these tests on the individual orders.   EXTRA TUBES    Narrative:     The following orders were created for panel order EXTRA TUBES.  Procedure                               Abnormality         Status                     ---------                               -----------         ------                     Light Blue Top Hold[[id number]]                             In process                 Gold Top Hold[[id number]]                                   In process                   Please view results for these tests on the individual orders.   LIGHT BLUE TOP HOLD   GOLD TOP HOLD       Discharge Medication List as of 4/2/2025  4:45 PM        CONTINUE these medications which have NOT CHANGED    Details   amoxicillin-clavulanate 875-125mg (AUGMENTIN) 875-125 mg per tablet Take 1 tablet by mouth every 12 (twelve) hours., Starting Mon 3/17/2025, Normal       azelastine (ASTELIN) 137 mcg (0.1 %) nasal spray 2 sprays (274 mcg total) by Nasal route 2 (two) times daily., Starting Tue 7/9/2024, Until Wed 7/9/2025, Normal      blood sugar diagnostic (BLOOD GLUCOSE TEST) Strp Check 1x daily. Insurance preferred., Normal      blood-glucose meter kit Use as instructed. Insurance preferred., Normal      !! cetirizine (ZYRTEC) 10 MG tablet Take 1 tablet (10 mg total) by mouth once daily., Starting Mon 10/28/2024, Until Tue 10/28/2025, Normal      !! cetirizine (ZYRTEC) 10 MG tablet Take 1 tablet (10 mg total) by mouth once daily. for 14 days, Starting Tue 4/1/2025, Until Tue 4/15/2025, Normal      clonazePAM (KLONOPIN) 1 MG tablet Take 1 mg by mouth 2 (two) times daily as needed for Anxiety., Historical Med      cyanocobalamin, vitamin B-12, (B-12 COMPLIANCE) 1,000 mcg/mL Kit Inject 1,000 mcg as directed once a week., Historical Med      cyclobenzaprine (FLEXERIL) 10 MG tablet Take 1 tablet (10 mg total) by mouth 2 (two) times daily as needed for Muscle spasms., Starting Mon 10/28/2024, Normal      doxepin (SINEQUAN) 10 MG capsule Take 1 capsule (10 mg total) by mouth nightly as needed (insomnia)., Starting Mon 10/28/2024, Until Tue 10/28/2025 at 2359, Normal      DULoxetine (CYMBALTA) 60 MG capsule Take 1 capsule (60 mg total) by mouth once daily., Starting Fri 3/22/2024, Normal      empagliflozin (JARDIANCE) 25 mg tablet Take 1 tablet (25 mg total) by mouth every morning., Starting Thu 7/18/2024, Normal      ergocalciferol (ERGOCALCIFEROL) 50,000 unit Cap Take one capsule 3x a week., Normal      famotidine (PEPCID) 20 MG tablet Take 1 tablet (20 mg total) by mouth once daily., Starting Tue 4/1/2025, Normal      fluconazole (DIFLUCAN) 200 MG Tab Take 1 tablet (200 mg total) by mouth as needed (Symptoms). Repeat in one week is symptoms persist., Starting Mon 10/28/2024, Normal      gabapentin (NEURONTIN) 400 MG capsule Take 1 capsule (400 mg total) by mouth 2 (two) times daily.,  Starting Thu 7/18/2024, Until Fri 7/18/2025, Normal      linaCLOtide (LINZESS) 290 mcg Cap capsule Take 1 capsule (290 mcg total) by mouth before breakfast., Starting Mon 10/28/2024, Normal      metFORMIN (GLUCOPHAGE) 1000 MG tablet Take 1 tablet (1,000 mg total) by mouth 2 (two) times daily with meals., Starting Wed 11/13/2024, Normal      pantoprazole (PROTONIX) 40 MG tablet Take 1 tablet (40 mg total) by mouth once daily., Starting Wed 11/29/2023, Normal      rosuvastatin (CRESTOR) 40 MG Tab Take 1 tablet (40 mg total) by mouth once daily., Starting Mon 3/17/2025, Until Tue 3/17/2026, Normal      SYNTHROID 125 mcg tablet Take 1 tablet (125 mcg total) by mouth before breakfast., Starting Wed 11/13/2024, Until Thu 11/13/2025, Normal      tirzepatide (MOUNJARO) 12.5 mg/0.5 mL PnIj Inject 12.5 mg into the skin every 7 days., Starting Mon 3/17/2025, Normal      valsartan-hydrochlorothiazide (DIOVAN-HCT) 160-25 mg per tablet Take 1 tablet by mouth once daily., Starting Mon 10/28/2024, Normal       !! - Potential duplicate medications found. Please discuss with provider.          Orders Placed This Encounter   Procedures    X-Ray Chest 1 View    CBC auto differential    Basic metabolic panel (BMP)    Troponin I High Sensitivity    CBC with Differential    EXTRA TUBES    Light Blue Top Hold    Gold Top Hold    Cardiac Monitoring - Adult    Pulse Oximetry Continuous    EKG 12-lead       Imaging Results              X-Ray Chest 1 View (Final result)  Result time 04/02/25 16:21:33      Final result by Mor Lee MD (04/02/25 16:21:33)                   Impression:      No acute process.      Electronically signed by: Mor Lee  Date:    04/02/2025  Time:    16:21               Narrative:    EXAMINATION:  XR CHEST 1 VIEW    CLINICAL HISTORY:  CP;    COMPARISON:  March 2020    FINDINGS:  Heart size is normal.  The mediastinum is unremarkable.  The lungs are clear.  No acute osseous abnormalities are  identified.                                  (radiology reading, visualized by me)      ED Course as of 04/02/25 1746   Wed Apr 02, 2025   1546 EKG:  Normal sinus rhythm at a rate of 82.  Normal intervals.  Normal axis.  No significant change compared to prior.  No significant ST or T wave changes suggesting acute ischemia or infarction.  (Independently interpreted by me)   [MR]   1620 CXR:  NAD. (Independently interpreted by me) [MR]      ED Course User Index  [MR] Ger Velásquez MD       MDM:    54 y.o. female with chest pain suggestive of upper GI etiology with workup initiated to exclude ACS.  I believe this is less likely.  EKG and cardiac biomarker ordered for further risk stratification.  I have very low suspicion for aortic dissection or PE.  Other lab sent to exclude end-organ dysfunction.  There is no abdominal pain or tenderness at present with very low suspicion for life-threatening intra-abdominal process such as perforated viscus, cholecystitis, abscess.  I do not think further abdominal imaging is indicated.  Symptomatic treatment with Maalox and Pepcid ordered here pending further workup.  Patient resting comfortably with cardiac biomarker negative with symptoms since yesterday.  I do not think she requires admission for cardiac monitoring or serial troponin assessment. Low risk HEART score.  She may follow up with Cardiology as an outpatient.  Detailed return precautions reviewed.    Diagnoses:    1. Chest pain       Ger Velásquez MD  04/02/25 1746

## 2025-04-07 LAB
OHS QRS DURATION: 90 MS
OHS QTC CALCULATION: 474 MS

## 2025-04-16 ENCOUNTER — OFFICE VISIT (OUTPATIENT)
Dept: URGENT CARE | Facility: CLINIC | Age: 55
End: 2025-04-16
Payer: OTHER GOVERNMENT

## 2025-04-16 VITALS
HEIGHT: 68 IN | SYSTOLIC BLOOD PRESSURE: 141 MMHG | TEMPERATURE: 98 F | DIASTOLIC BLOOD PRESSURE: 81 MMHG | RESPIRATION RATE: 20 BRPM | WEIGHT: 270 LBS | OXYGEN SATURATION: 100 % | HEART RATE: 91 BPM | BODY MASS INDEX: 40.92 KG/M2

## 2025-04-16 DIAGNOSIS — L29.9 PRURITIC CONDITION: ICD-10-CM

## 2025-04-16 DIAGNOSIS — L50.9 URTICARIA: ICD-10-CM

## 2025-04-16 PROCEDURE — 99214 OFFICE O/P EST MOD 30 MIN: CPT | Mod: S$GLB,,, | Performed by: STUDENT IN AN ORGANIZED HEALTH CARE EDUCATION/TRAINING PROGRAM

## 2025-04-16 RX ORDER — ESTRADIOL 0.1 MG/G
CREAM VAGINAL
COMMUNITY

## 2025-04-16 RX ORDER — METHYLPREDNISOLONE 4 MG/1
TABLET ORAL
COMMUNITY
Start: 2024-12-29 | End: 2025-04-16

## 2025-04-16 RX ORDER — METHYLPREDNISOLONE 4 MG/1
TABLET ORAL
Qty: 21 EACH | Refills: 0 | Status: SHIPPED | OUTPATIENT
Start: 2025-04-16 | End: 2025-05-07

## 2025-04-16 RX ORDER — TRIAMCINOLONE ACETONIDE 1 MG/G
OINTMENT TOPICAL 2 TIMES DAILY
Qty: 30 G | Refills: 1 | Status: SHIPPED | OUTPATIENT
Start: 2025-04-16

## 2025-04-16 RX ORDER — HYDROXYZINE HYDROCHLORIDE 25 MG/1
25 TABLET, FILM COATED ORAL 3 TIMES DAILY PRN
Qty: 21 TABLET | Refills: 0 | Status: SHIPPED | OUTPATIENT
Start: 2025-04-16

## 2025-04-16 RX ORDER — DICLOFENAC POTASSIUM 50 MG/1
50 TABLET, FILM COATED ORAL 3 TIMES DAILY
COMMUNITY
Start: 2024-12-29

## 2025-04-16 NOTE — PROGRESS NOTES
"Subjective:      Patient ID: Patria Bennett is a 54 y.o. female.    Vitals:  height is 5' 8" (1.727 m) and weight is 122.5 kg (270 lb). Her oral temperature is 98.1 °F (36.7 °C). Her blood pressure is 141/81 (abnormal) and her pulse is 91. Her respiration is 20 and oxygen saturation is 100%.     Chief Complaint: body itching     Patient is a 54-year-old female with a past medical history anxiety, hypertension, hyperlipidemia, type 2 diabetes, GERD, and insomnia who presents to clinic for evaluation of hives type rash and itching.  Patient states hives come and go.  Patient states has been experiencing intermittent itching for a couple of weeks.  Patient states previously seen for similar symptoms stating that Pepcid, Zyrtec and Benadryl did help relieve symptoms.  Patient states that she recently started having same symptoms again.  Patient states she is about to go out of country on vacation.  Patient states would like a steroid.  Patient states this helped previously.  Patient states that it does increase her blood sugars some however she will watch her diet and sugars.  Patient states that she has no rash at this time just has itching.  Patient states that she has not had any new contacts or exposures to her knowledge.  Patient states no history of recurrent skin infections.  Patient states no other family members sick with similar.        Constitution: Negative. Negative for chills, sweating, fatigue and fever.   HENT: Negative.     Neck: neck negative.   Cardiovascular: Negative.  Negative for chest pain and palpitations.   Eyes: Negative.    Respiratory: Negative.  Negative for chest tightness, cough, shortness of breath and stridor.    Gastrointestinal: Negative.  Negative for abdominal pain, nausea, vomiting and diarrhea.   Endocrine: negative.   Genitourinary: Negative.    Musculoskeletal: Negative.  Negative for muscle ache.   Skin:  Positive for rash and hives. Negative for erythema. "   Allergic/Immunologic: Positive for hives and itching.   Neurological:  Negative for disorientation and altered mental status.   Hematologic/Lymphatic: Negative.    Psychiatric/Behavioral: Negative.  Negative for altered mental status, disorientation and confusion.       Objective:     Physical Exam   Constitutional: She is oriented to person, place, and time. She appears well-developed. She is cooperative.  Non-toxic appearance. She does not appear ill. No distress. obesity  HENT:   Head: Normocephalic and atraumatic.   Ears:   Right Ear: Hearing and external ear normal.   Left Ear: Hearing and external ear normal.   Nose: Nose normal. No mucosal edema or nasal deformity. No epistaxis. Right sinus exhibits no maxillary sinus tenderness and no frontal sinus tenderness. Left sinus exhibits no maxillary sinus tenderness and no frontal sinus tenderness.   Mouth/Throat: Uvula is midline, oropharynx is clear and moist and mucous membranes are normal. Mucous membranes are moist. No trismus in the jaw. Normal dentition. No uvula swelling. Oropharynx is clear.   Eyes: Conjunctivae and lids are normal. Pupils are equal, round, and reactive to light. Right eye exhibits no discharge. Left eye exhibits no discharge. No scleral icterus.   Neck: Trachea normal and phonation normal. Neck supple. No neck rigidity present.   Cardiovascular: Normal rate, regular rhythm, normal heart sounds and normal pulses.   Pulmonary/Chest: Effort normal and breath sounds normal. No respiratory distress. She has no wheezes. She has no rhonchi. She has no rales.   Abdominal: Normal appearance. She exhibits no distension. Soft.   Musculoskeletal: Normal range of motion.         General: Normal range of motion.      Cervical back: She exhibits no tenderness.   Neurological: She is alert and oriented to person, place, and time. She exhibits normal muscle tone.   Skin: Skin is warm, dry, intact, not diaphoretic, not pale and no rash (No visual rash  appreciated at this time). Capillary refill takes 2 to 3 seconds. No erythema   Psychiatric: Her speech is normal and behavior is normal. Judgment and thought content normal.   Nursing note and vitals reviewed.      Assessment:     1. Urticaria    2. Pruritic condition        Plan:       Urticaria  -     Ambulatory referral/consult to Dermatology    Pruritic condition  -     Ambulatory referral/consult to Dermatology    Other orders  -     methylPREDNISolone (MEDROL DOSEPACK) 4 mg tablet; use as directed  Dispense: 21 each; Refill: 0  -     hydrOXYzine HCL (ATARAX) 25 MG tablet; Take 1 tablet (25 mg total) by mouth 3 (three) times daily as needed for Itching.  Dispense: 21 tablet; Refill: 0  -     triamcinolone acetonide 0.1% (KENALOG) 0.1 % ointment; Apply topically 2 (two) times daily.  Dispense: 30 g; Refill: 1                History and physical reviewed with patient.  A1c previous March 2025 7.9.    Patient advised to closely monitor blood sugar and diet over the next week to 2 weeks.    Continue Pepcid and Zyrtec daily as directed.    Referral placed for Dermatology; follow-up once scheduled.  Follow-up with PCP in 1-2 days.    Return to clinic as needed.    To ED for any new or acutely worsening symptoms.    Patient in agreement with plan of care.    DISCLAIMER: Please note that my documentation in this Electronic Healthcare Record was produced using speech recognition software and therefore may contain errors related to that software system.These could include grammar, punctuation and spelling errors or the inclusion/exclusion of phrases that were not intended. Garbled syntax, mangled pronouns, and other bizarre constructions may be attributed to that software system.

## 2025-05-05 ENCOUNTER — OFFICE VISIT (OUTPATIENT)
Dept: FAMILY MEDICINE | Facility: CLINIC | Age: 55
End: 2025-05-05
Payer: OTHER GOVERNMENT

## 2025-05-05 VITALS
HEART RATE: 80 BPM | DIASTOLIC BLOOD PRESSURE: 78 MMHG | BODY MASS INDEX: 32.99 KG/M2 | WEIGHT: 217.69 LBS | HEIGHT: 68 IN | OXYGEN SATURATION: 98 % | SYSTOLIC BLOOD PRESSURE: 130 MMHG

## 2025-05-05 DIAGNOSIS — L50.8 RECURRENT URTICARIA: Primary | ICD-10-CM

## 2025-05-05 DIAGNOSIS — E78.2 MIXED HYPERLIPIDEMIA: ICD-10-CM

## 2025-05-05 DIAGNOSIS — M25.50 ARTHRALGIA, UNSPECIFIED JOINT: ICD-10-CM

## 2025-05-05 DIAGNOSIS — I10 ESSENTIAL HYPERTENSION: ICD-10-CM

## 2025-05-05 DIAGNOSIS — K21.9 GASTROESOPHAGEAL REFLUX DISEASE WITHOUT ESOPHAGITIS: ICD-10-CM

## 2025-05-05 PROCEDURE — 99214 OFFICE O/P EST MOD 30 MIN: CPT | Mod: S$PBB,,, | Performed by: FAMILY MEDICINE

## 2025-05-05 PROCEDURE — 99999 PR PBB SHADOW E&M-EST. PATIENT-LVL V: CPT | Mod: PBBFAC,,, | Performed by: FAMILY MEDICINE

## 2025-05-05 PROCEDURE — 99215 OFFICE O/P EST HI 40 MIN: CPT | Mod: PBBFAC,PN | Performed by: FAMILY MEDICINE

## 2025-05-05 RX ORDER — PANTOPRAZOLE SODIUM 40 MG/1
40 TABLET, DELAYED RELEASE ORAL DAILY
Qty: 90 TABLET | Refills: 2 | Status: SHIPPED | OUTPATIENT
Start: 2025-05-05

## 2025-05-05 NOTE — PROGRESS NOTES
Subjective:       Patient ID: Patria Bennett is a 55 y.o. female.    Chief Complaint: Follow-up (Pt is here for her 6 month follow up)    History of Present Illness    CHIEF COMPLAINT:  Ms. Bennett presents today for follow up of multiple symptoms including body pain and itching.    PAIN MANAGEMENT:  She reports generalized body pain managed with gabapentin and duloxetine with good effect. However, she experiences significant morning drowsiness after taking these medications. Due to these side effects, she only takes duloxetine on weekend nights when not working the next day. She also reports chest pain which healthcare providers have attributed to reflux.    DERMATOLOGIC:  She reports whole body pruritus from head to feet occurring 2-3 times per week without any recent medication changes.    OCULAR:  Recent eye exam revealed cataracts.    LABS:  Thyroid results were normal. Initial arthritis test was positive but reflex test was negative, indicating no significant findings.      ROS:  Cardiovascular: +chest pain  Gastrointestinal: +indigestion  Musculoskeletal: +body aches  Skin: +itching  Psychiatric: +anxiety             Problem List[1]  Patria has a current medication list which includes the following prescription(s): blood sugar diagnostic, blood-glucose meter, clonazepam, b-12 compliance, cyclobenzaprine, diclofenac, doxepin, duloxetine, empagliflozin, ergocalciferol, estradiol, famotidine, fluconazole, gabapentin, hydroxyzine hcl, linaclotide, metformin, rosuvastatin, synthroid, mounjaro, triamcinolone acetonide 0.1%, valsartan-hydrochlorothiazide, and pantoprazole.        Health Maintenance Due   Topic Date Due    Hepatitis C Screening  Never done    Diabetic Eye Exam  Never done    Shingles Vaccine (1 of 2) Never done    Pneumococcal Vaccines (Age 50+) (2 of 2 - PCV) 10/20/2021    COVID-19 Vaccine (1 - 2024-25 season) Never done      Health Maintenance reviewed and discussed.   Objective:      Vitals:     "05/05/25 1637   BP: 130/78   Pulse: 80   SpO2: 98%   Weight: 98.7 kg (217 lb 11.2 oz)   Height: 5' 8" (1.727 m)   PainSc: 0-No pain     Body mass index is 33.1 kg/m².  Physical Exam  Vitals and nursing note reviewed.   Constitutional:       General: She is not in acute distress.     Appearance: She is obese. She is not ill-appearing.   Cardiovascular:      Rate and Rhythm: Normal rate and regular rhythm.      Heart sounds: No murmur heard.  Pulmonary:      Effort: Pulmonary effort is normal.      Breath sounds: Normal breath sounds. No wheezing.   Skin:     General: Skin is warm and dry.      Findings: No rash.   Neurological:      Mental Status: She is alert.   Psychiatric:         Mood and Affect: Mood normal.         Behavior: Behavior normal.         Assessment:       Assessment & Plan    1. Reviewed recent lab results including basic metabolic panel and blood count.  2. Noted complaints of chest pain, body pain, and itching.  3. Considered potential allergic etiology for itching symptoms.  4. Assessed effectiveness and side effects of current pain management regimen (gabapentin and duloxetine).  5. Reviewed previous arthritis and thyroid lab results, which were normal.           Plan:       1. Recurrent urticaria  -     Ambulatory referral/consult to Allergy; Future; Expected date: 05/12/2025    2. Gastroesophageal reflux disease without esophagitis  -     pantoprazole (PROTONIX) 40 MG tablet; Take 1 tablet (40 mg total) by mouth once daily.  Dispense: 90 tablet; Refill: 2    3. Essential hypertension    4. Mixed hyperlipidemia    5. Arthralgia, unspecified joint  -     Sedimentation rate; Future; Expected date: 05/05/2025  -     C-Reactive Protein; Future; Expected date: 05/05/2025  -     Uric Acid; Future; Expected date: 05/05/2025         This note was generated with the assistance of ambient listening technology. Verbal consent was obtained by the patient and accompanying visitor(s) for the recording of " patient appointment to facilitate this note. I attest to having reviewed and edited the generated note for accuracy, though some syntax or spelling errors may persist. Please contact the author of this note for any clarification.         [1]   Patient Active Problem List  Diagnosis    Gastroesophageal reflux disease    Essential hypertension    Type 2 diabetes mellitus without complication    Primary insomnia    BRANDY (generalized anxiety disorder)    Morbid obesity    Hyperlipidemia

## 2025-05-07 ENCOUNTER — PATIENT OUTREACH (OUTPATIENT)
Dept: ADMINISTRATIVE | Facility: HOSPITAL | Age: 55
End: 2025-05-07
Payer: OTHER GOVERNMENT

## 2025-05-07 NOTE — PROGRESS NOTES
Population Health Chart Review & Patient Outreach Details      Additional [facility] Health Notes:               Updates Requested / Reviewed:      Updated Care Coordination Note, Care Everywhere, , Care Team Updated, and Immunizations Reconciliation Completed or Queried: [facility] Topics Overdue:      [facility] Score: 1     Eye Exam                       Health Maintenance Topic(s) Outreach Outcomes & Actions Taken:    Eye Exam - Outreach Outcomes & Actions Taken  : External Records Requested & Care Team Updated if Applicable

## 2025-05-07 NOTE — LETTER
AUTHORIZATION FOR RELEASE OF   CONFIDENTIAL INFORMATION    Dear Dr Hernandez,    We are seeing Patria Bennett, date of birth 1970, in the clinic at [facility]. Madonna Collins MD is the patient's PCP. Patria Bennett has an outstanding lab/procedure at the time we reviewed her chart. In order to help keep her health information updated, she has authorized us to request the following medical record(s):                                               ( X )  EYE EXAM                 Diabetic EYE EXAM           Please include the actual eye exam report along with the significant findings:    ________ No Diabetic Retinopathy  ________ Minimal Background Diabetic Retinopathy  ________ Moderate to Severe Background Diabetic Retinopathy  ________ Clinically Significant Macular Edema  ________ Proliferative Diabetic Retinopathy               Please fax records to [facility], [place], Sarah E., MD, [phone].        Thank you  Julieta Pimentel LPN  Clinical Care Coordinator  [facility]             Patient Name: Patria Bennett  : 1970  Patient Phone #: [phone]                        Patria Bennett  MRN: [id number]  : 1970  Age: 54 y.o.  Sex: female         Patient/Legal Guardian Signature  This signature was collected at 2024    Patria Bennett     Self  _______________________________   Printed Name/Relationship to Patient      Consent for Examination and Treatment: I hereby authorize the providers and employees of [facility] ([facility]) to provide medical treatment/services which includes, but is not limited to, performing and administering tests and diagnostic procedures that are deemed necessary, including, but not limited to, imaging examinations, blood tests and other laboratory procedures as may be required by the hospital, clinic, or may be ordered by my physician(s) or persons working under the general and/or special instructions of my physician(s).      I  Patient Education        Sinusitis in Teens: Care Instructions  Your Care Instructions     Sinusitis is an infection of the lining of the sinus cavities in your head. Sinusitis often follows a cold. It causes pain and pressure in your head andface. In most cases, sinusitis gets better on its own in 1 to 2 weeks. But some mildsymptoms may last for several weeks. Sometimes antibiotics are needed. Follow-up care is a key part of your treatment and safety. Be sure to make and go to all appointments, and call your doctor if you are having problems. It's also a good idea to know your test results and keep alist of the medicines you take. How can you care for yourself at home?  Take an over-the-counter pain medicine, such as acetaminophen (Tylenol), ibuprofen (Advil, Motrin), or naproxen (Aleve). Read and follow all instructions on the label.  If the doctor prescribed antibiotics, take them as directed. Do not stop taking them just because you feel better. You need to take the full course of antibiotics.  Be careful when taking over-the-counter cold or flu medicines and Tylenol at the same time. Many of these medicines have acetaminophen, which is Tylenol. Read the labels to make sure that you are not taking more than the recommended dose. Too much acetaminophen (Tylenol) can be harmful.  Breathe warm, moist air from a steamy shower, a hot bath, or a sink filled with hot water. Avoid cold, dry air. Using a humidifier in your home may help. Follow the directions for cleaning the machine.  Use saline (saltwater) nasal washes. This can help keep your nasal passages open and wash out mucus and bacteria. You can buy saline nose drops at a grocery store or drugstore. Or you can make your own at home by adding 1 teaspoon of salt and 1 teaspoon of baking soda to 2 cups of distilled water. If you make your own, fill a bulb syringe with the solution, insert the tip into your nostril, and squeeze gently.  Carlos moreno nose.   Put a hot, wet towel or a warm gel pack on your face 3 or 4 times a day for 5 to 10 minutes each time.  Try a decongestant nasal spray like oxymetazoline (Afrin). Do not use it for more than 3 days in a row. Using it for more than 3 days can make your congestion worse. When should you call for help? Call your doctor now or seek immediate medical care if:     You have new or worse symptoms of infection, such as:  ? Increased pain, swelling, warmth, or redness. ? Red streaks leading from the area. ? Pus draining from the area. ? A fever. Watch closely for changes in your health, and be sure to contact your doctor if:     You are not getting better as expected. Where can you learn more? Go to https://[employer]Station[employer]. org and sign in to your [employer] account. Enter K564 in the Judobaby box to learn more about \"Sinusitis in Teens: Care Instructions. \"     If you do not have an account, please click on the \"Sign Up Now\" link. Current as of: September 8, 2021               Content Version: 13.2  © 0832-0650 Healthwise, Incorporated. Care instructions adapted under license by [facility] ([facility]). If you have questions about a medical condition or this instruction, always ask your healthcare professional. [address] any warranty or liability for your use of this information. understand and agree that this consent covers all authorized persons, including but not limited to physicians, residents, nurse practitioners, physicians' assistants, specialists, consultants, student nurses, and independently contracted physicians, who are called upon by the physician in charge, to carry out the diagnostic procedures and medical or surgical treatment.     I hereby authorize [facility] to retain or dispose of any specimens or tissue, should there be such remaining from any test or procedure.     I hereby authorize and give consent for [facility] providers and employees to take photographs, images or videotapes of such diagnostic, surgical or treatment procedures of Patient as may be required by [facility] or as may be ordered by a physician. I further acknowledge and agree that [facility] may use cameras or other devices for patient monitoring.     I am aware that the practice of medicine is not an exact science, and I acknowledge that no guarantees have been made to me as to the outcome of any tests, procedures or treatment.     Authorization for Release of Information: I understand that my insurance company and/or their agents may need information necessary to make determinations about payment/reimbursement. I hereby provide authorization to release to all insurance companies, their successors, assignees, other parties with whom they may have contracted, or others acting on their behalf, that are involved with payment for any hospital and/or clinic charges incurred by the patient, any information that they request and deem necessary for payment/reimbursement, and/or quality review.  I further authorize the release of my health information to physicians or other health care practitioners on staff who are involved in my health care now and in the future, and to other health care providers, entities, or institutions for the purpose of my continued care and treatment, including referrals.     REGISTRATION  AUTHORIZATION  Form No. 28847 (Rev. 3/25/2024)    Page 1 of 3                       Medicare Patient's Certification and Authorization to Release Information and Payment Request:  I certify that the information given by me in applying for payment under Title XVIII of the Social Security Act is correct. I authorize any santos of medical or other information about me to release to the Social SecurityAdministration, or its intermediaries or carriers, any information needed for this or a related Medicare claim. I request that payment of authorized benefits be made on my behalf.     Assignment of Insurance Benefits:   I hereby authorize any and all insurance companies, health plans, defined   benefit plans, health insurers or any entity that is or may be responsible for payment of my medical expenses to pay all hospital and medical benefits now due, and to become due and payable to me under any hospital benefits, sick benefits, injury benefits or any other benefit for services rendered to me, including Major Medical Benefits, direct to [facility] and all independently contracted physicians. I assign any and all rights that I may have against any and all insurance companies, health plans, defined benefit plans, health insurers or any entity that is or may be responsible for payment of my medical expenses, including, but not limited to any right to appeal a denial of a claim, any right to bring any action, lawsuit, administrative proceeding, or other cause of action on my behalf. I specifically assign my right to pursue litigation against any and all insurance companies, health plans, defined benefit plans, health insurers or any entity that is or may be responsible for payment of my medical expenses based upon a refusal to pay charges.            E. Valuables: It is understood and agreed that [facility] is not liable for the damage to or loss of any money, jewelry,   documents, dentures, eye glasses, hearing aids, prosthetics,  or other property of value.     F. Computer Equipment: I understand and agree that should I choose to use computer equipment owned by [facility] or if I choose to access the Internet via [facility]s network, I do so at my own risk. [facility] is not responsible for any damage to my computer equipment or to any damages of any type that might arise from my loss of equipment or data.     G. Acceptance of Financial Responsibility:  I agree that in consideration of the services and   supplies that have been   or will be furnished to the patient, I am hereby obligated to pay all charges made for or on the account of the patient according to the standard rates (in effect at the time the services and supplies are delivered) established by [facility], including its Patient Financial Assistance Policy to the extent it is applicable. I understand that I am responsible for all charges, or portions thereof, not covered by insurance or other sources. Patient refunds will be distributed only after balances at all [facility] facilities are paid.     H. Communication Authorization:  I hereby authorize [facility] and its representatives, along with any billing service   or  who may work on their behalf, to contact me on   my cell phone and/or home phone using pre- recorded messages, artificial voice messages, automatic telephone dialing devices or other computer assisted technology, or by electronic      mail, text messaging, or by any other form of electronic communication. This includes, but is not limited to, appointment reminders, yearly physical exam reminders, preventive care reminders, patient campaigns, welcome calls, and calls about account balances on my account or any account on which I am listed as a guarantor. I understand I have the right to opt out of these communications at any time.      Relationship  Between  Facility and  Provider:      I understand that some, but not all, providers furnishing services to the  patient are not employees or agents of [facility]. The patient is under the care and supervision of his/her attending physician, and it is the responsibility of the facility and its nursing staff to carry out the instructions of such physicians. It is the responsibility of the patient's physician/designee to obtain the patient's informed consent, when required, for medical or surgical treatment, special diagnostic or therapeutic procedures, or hospital services rendered for the patient under the special instructions of the physician/designee.           REGISTRATION AUTHORIZATION  Form No. 76501 (Rev. 3/25/2024)    Page 2 of 3                       Immunizations: [facility] shares immunization information with state sponsored health departments to help you and your doctor keep track of your immunization records. By signing, you consent to have this information shared with the health department in your state:                                [place] - LINKS ([place] Immunization Network for Kids Statewide)                                [place] - MIIX ([place] Immunization Information eXchange)                                [place] - ImmPRINT (Immunization Patient Registry with Integrated Technology)     TERM: This authorization is valid for this and subsequent care/treatment I receive at [facility] and will remain valid unless/until revoked in writing by me.     [facility]: As used in this document, [facility] means all [facility] owned and managed facilities, including, but not limited to, all health centers, surgery centers, clinics, urgent care centers, and hospitals.         [facility] complies with applicable Federal civil rights laws and does not discriminate on the basis of race, color, national origin, age, disability, or sex.  ATENCIÓN: si wilnerla martine, tiene a barragan disposición servicios gratuitos de asistencia lingüística. Liss conner [phone].  [facility] Ý: N?u b?n nói Ti?ng Vi?t, có  các d?ch v? h? tr? ngôn ng? mi?n phí dành cho b?n. G?i s? [phone].        REGISTRATION AUTHORIZATION  Form No. 40446 (Rev. 3/25/2024)   Page 3 of 3

## 2025-05-08 ENCOUNTER — LAB VISIT (OUTPATIENT)
Dept: LAB | Facility: HOSPITAL | Age: 55
End: 2025-05-08
Attending: FAMILY MEDICINE
Payer: OTHER GOVERNMENT

## 2025-05-08 DIAGNOSIS — M25.50 ARTHRALGIA, UNSPECIFIED JOINT: ICD-10-CM

## 2025-05-08 LAB
CRP SERPL-MCNC: 2.8 MG/L
ERYTHROCYTE [SEDIMENTATION RATE] IN BLOOD: 5 MM/HR (ref 0–20)
URATE SERPL-MCNC: 6.5 MG/DL (ref 2.4–5.7)

## 2025-05-08 PROCEDURE — 36415 COLL VENOUS BLD VENIPUNCTURE: CPT

## 2025-05-08 PROCEDURE — 84550 ASSAY OF BLOOD/URIC ACID: CPT

## 2025-05-08 PROCEDURE — 86140 C-REACTIVE PROTEIN: CPT

## 2025-05-08 PROCEDURE — 85651 RBC SED RATE NONAUTOMATED: CPT

## 2025-05-18 ENCOUNTER — RESULTS FOLLOW-UP (OUTPATIENT)
Dept: FAMILY MEDICINE | Facility: CLINIC | Age: 55
End: 2025-05-18
Payer: OTHER GOVERNMENT

## 2025-05-18 DIAGNOSIS — B37.31 VAGINAL YEAST INFECTION: ICD-10-CM

## 2025-05-19 RX ORDER — FLUCONAZOLE 200 MG/1
200 TABLET ORAL
Qty: 2 TABLET | Refills: 1 | Status: SHIPPED | OUTPATIENT
Start: 2025-05-19

## 2025-05-19 RX ORDER — ALLOPURINOL 100 MG/1
100 TABLET ORAL DAILY
Qty: 90 TABLET | Refills: 1 | Status: SHIPPED | OUTPATIENT
Start: 2025-05-19

## 2025-05-19 NOTE — PROGRESS NOTES
Reviewed results with patient verbalized understanding she states she would like a prescription to help with pain sent to her mail order pharmacy on file she also requested Rx fro yeast infection. She states sometimes when her blood sugars are out of wack he tends to get them and she is currently having some vaginal itching/ burning and discomfort. She states it has been a while. Last Rx for fluconazole prescribed 10/08/2024 Please advise. She would like that to be sent to her mail order pharmacy as well if refill approved.    Last office visit 5/5/25

## 2025-06-30 ENCOUNTER — LAB VISIT (OUTPATIENT)
Dept: LAB | Facility: HOSPITAL | Age: 55
End: 2025-06-30
Attending: STUDENT IN AN ORGANIZED HEALTH CARE EDUCATION/TRAINING PROGRAM
Payer: OTHER GOVERNMENT

## 2025-06-30 ENCOUNTER — OFFICE VISIT (OUTPATIENT)
Dept: ALLERGY | Facility: CLINIC | Age: 55
End: 2025-06-30
Payer: OTHER GOVERNMENT

## 2025-06-30 VITALS — BODY MASS INDEX: 40.33 KG/M2 | WEIGHT: 266.13 LBS | OXYGEN SATURATION: 98 % | HEART RATE: 87 BPM | HEIGHT: 68 IN

## 2025-06-30 DIAGNOSIS — J31.0 CHRONIC RHINITIS: ICD-10-CM

## 2025-06-30 DIAGNOSIS — J31.0 CHRONIC RHINITIS: Primary | ICD-10-CM

## 2025-06-30 DIAGNOSIS — L50.8 CHRONIC URTICARIA: ICD-10-CM

## 2025-06-30 PROCEDURE — 99999 PR PBB SHADOW E&M-EST. PATIENT-LVL V: CPT | Mod: PBBFAC,,, | Performed by: STUDENT IN AN ORGANIZED HEALTH CARE EDUCATION/TRAINING PROGRAM

## 2025-06-30 PROCEDURE — 83520 IMMUNOASSAY QUANT NOS NONAB: CPT

## 2025-06-30 PROCEDURE — 86003 ALLG SPEC IGE CRUDE XTRC EA: CPT

## 2025-06-30 PROCEDURE — 99215 OFFICE O/P EST HI 40 MIN: CPT | Mod: PBBFAC,PO | Performed by: STUDENT IN AN ORGANIZED HEALTH CARE EDUCATION/TRAINING PROGRAM

## 2025-06-30 PROCEDURE — 99204 OFFICE O/P NEW MOD 45 MIN: CPT | Mod: S$PBB,,, | Performed by: STUDENT IN AN ORGANIZED HEALTH CARE EDUCATION/TRAINING PROGRAM

## 2025-06-30 PROCEDURE — 36415 COLL VENOUS BLD VENIPUNCTURE: CPT

## 2025-06-30 NOTE — PATIENT INSTRUCTIONS
URTICARIA      Urticaria is another term for hives or wheals. This is a very itchy skin rash that develops quickly, and may also fade quickly. It is sometimes seen with patches of swelling around the eyes or on the face. The swelling is called angioedema.    Hives and/or angioedema can develop during allergic reactions. Foods and medications are the most common culprits for producing such an allergic reaction. But not all hives are associated with an allergic reaction. Sometimes infections such as those caused by viruses can cause hives. Other factors such as temperature, pressure or even vibration can also cause hives. It is common to have hives that are sporadic and not associated with an allergic reaction. These sporadic and recurrent hives are treated with oral antihistamines (eg loratadine, cetirizine, levocetirizine, fexofenadine). Some patients require up to 4x the regular dose of antihistamines to control their hives. Please see below instruction on how to increase oral antihistamines for control of urticaria/hives:     Start by taking one pill a day. If you are still experiencing hives increase to one pill twice a day. If hives are still persisting can take one tablet in the morning and two tablets at night (total 3 pills in a day). If hives are still persistent can increase to two pills twice a day (total 4 pills in a day). Please reach out if you are at three pills a day and have not reached resolution as there are other medications that can be added to this regimen.

## 2025-06-30 NOTE — PROGRESS NOTES
"ALLERGY & IMMUNOLOGY CLINIC       HISTORY OF PRESENT ILLNESS   Referral from: Dr. Madonna Collins  CC: Recurrent hives    HPI: Patria Bennett is a 55 y.o. female  History obtained from patient    Hives: started for 1-2 months episodes of hives. Hives on piks and dermatographism. Associated with pruritus and crawling sensation. They last 1-2 days. Sporadic. These occur every 2 weeks or so. Some soreness in the areas bu no bruising. Even without hives she is constantly   Taking cetirizine q am and benadryl pm, famotidine as needed. Never every single day.   This is the first time.   ROS: No unintentional weight loss, off and of arthrlagias   HCM:  normal c scope and UTD, abnormal pap smear  getting often, mammogram is UTD   Cerave daily after bathing.     Worsening PND and throat and ear itching worse in the past 3 months of so. No cold before this. No sneezing  Meds: taking daily OAH which helps, no nasal sprays   Environment: no pets, no carpets, no smoking exposure, no water damage.        MEDICAL HISTORY   SurgHx:  Past Surgical History:   Procedure Laterality Date    COLONOSCOPY  02/01/2022    REDUCTION OF BOTH BREASTS      THYROIDECTOMY, PARTIAL Left 12/02/2021    Procedure: THYROIDECTOMY-SUBTOTAL;  Surgeon: Nishant William MD;  Location: [facility];  Service: ENT;  Laterality: Left;    TONSILLECTOMY       Diabetes  mellitus type II   HTN   HLD   Hypothyroidism after partial thyroidectomy       PHYSICAL EXAM   VS: Pulse 87   Ht 5' 8" (1.727 m)   Wt 120.7 kg (266 lb 1.5 oz)   SpO2 98%   BMI 40.46 kg/m²   GENERAL: NAD, well nourished, well appearing  EYES: no conjunctival injection, no discharge, no infraorbital shiners  DERM: no rashes       LABS AND IMAGING     Normal CRP and ESR      ASSESSMENT & PLAN     Chronic spontaneous urticaria  Dermatographism: reviewed the pathophysiology. Occuring for 8 weeks. Normal labs. This is not an allergy.     -Reviewed antihistamines especially Allegra with max 4 pills a " day. Can add famotidine 20 mg BID if not improvement   -Avoid steroid benadryl   -Add tryptase     Chronic rhinitis: new problem, itching ear and throat with congestion.     -Indoor and outdoor immunocaps ordered     Follow up: 1-2 weeks to assess how she is doing on OAH and wean. Review labs       Alejandro Ag MD   [facility]

## 2025-07-02 LAB — TRYPTASE SERPL-MCNC: 4.7 NG/ML

## 2025-07-03 LAB
W ALLERGY INTERPRETATION: ABNORMAL
W ALTERNARIA ALTERNATA, CLASS: ABNORMAL
W ALTERNARIA ALTERNATA, IGE: <0.1 KU/L
W ASPERGILLUS FUMIGATUS, CLASS: ABNORMAL
W ASPERGILLUS FUMIGATUS, IGE: <0.1 KU/L
W BERMUDA GRASS, CLASS: ABNORMAL
W BERMUDA GRASS, IGE: 0.11 KU/L
W CAT DANDER, CLASS: ABNORMAL
W CAT DANDER, IGE: <0.1 KU/L
W CLADOSPORIUM HERBARUM, CLASS: ABNORMAL
W CLADOSPORIUM HERBARUM, IGE: <0.1 KU/L
W COCKROACH, GERMAN, CLASS: ABNORMAL
W COCKROACH, GERMAN, IGE: 0.12 KU/L
W COMMON PIGWEED, CLASS: ABNORMAL
W COMMON PIGWEED, IGE: <0.1 KU/L
W COMMON RAGWEED (SHORT), CLASS: ABNORMAL
W COMMON RAGWEED (SHORT), IGE: 0.13 KU/L
W COMMON SILVER BIRCH, CLASS: ABNORMAL
W COMMON SILVER BIRCH, IGE: <0.1 KU/L
W DERMATOPHAGOIDES FARINAE CLASS: ABNORMAL
W DERMATOPHAGOIDES FARINAE, IGE: 3.05 KU/L
W DERMATOPHAGOIDES PTERONYSSINUS CLASS: ABNORMAL
W DERMATOPHAGOIDES PTERONYSSINUS, IGE: 2.26 KU/L
W DOG DANDER, CLASS: ABNORMAL
W DOG DANDER, IGE: <0.1 KU/L
W ELM, CLASS: ABNORMAL
W ELM, IGE: 0.16 KU/L
W IGE: 150 IU/ML
W MAPLE (BOX ELDER), CLASS: ABNORMAL
W MAPLE (BOX ELDER), IGE: 0.12 KU/L
W MOUNTAIN JUNIPER CLASS: ABNORMAL
W MOUNTAIN JUNIPER, IGE: <0.1 KU/L
W MOUSE URINE PROTEINS CLASS: ABNORMAL
W MOUSE URINE PROTEINS, IGE: <0.1 KU/L
W MULBERRY, CLASS: ABNORMAL
W MULBERRY, IGE: <0.1 KU/L
W OAK, CLASS: ABNORMAL
W OAK, IGE: 0.1 KU/L
W PECAN, HICKORY, CLASS: ABNORMAL
W PECAN, HICKORY, IGE: <0.1 KU/L
W PENICILLIUM CHRYSOGENUM, CLASS: ABNORMAL
W PENICILLIUM CHRYSOGENUM, IGE: <0.1 KU/L
W ROUGH MARSHELDER, CLASS: ABNORMAL
W ROUGH MARSHELDER, IGE: 0.13 KU/L
W TIMOTHY GRASS, CLASS: ABNORMAL
W TIMOTHY GRASS, IGE: 0.17 KU/L
W WALNUT TREE, CLASS: ABNORMAL
W WALNUT TREE, IGE: 0.14 KU/L

## 2025-07-14 ENCOUNTER — LAB VISIT (OUTPATIENT)
Dept: LAB | Facility: HOSPITAL | Age: 55
End: 2025-07-14
Attending: PHYSICIAN ASSISTANT
Payer: OTHER GOVERNMENT

## 2025-07-14 DIAGNOSIS — E11.9 TYPE 2 DIABETES MELLITUS WITHOUT COMPLICATION, WITHOUT LONG-TERM CURRENT USE OF INSULIN: ICD-10-CM

## 2025-07-14 LAB
ALBUMIN SERPL-MCNC: 4 G/DL (ref 3.5–5.2)
ANION GAP (SMH): 10 MMOL/L (ref 8–16)
BUN SERPL-MCNC: 13 MG/DL (ref 6–20)
CALCIUM SERPL-MCNC: 9.3 MG/DL (ref 8.7–10.5)
CHLORIDE SERPL-SCNC: 106 MMOL/L (ref 95–110)
CHOLEST SERPL-MCNC: 171 MG/DL (ref 120–199)
CHOLEST/HDLC SERPL: 4.8 {RATIO} (ref 2–5)
CO2 SERPL-SCNC: 25 MMOL/L (ref 23–29)
CREAT SERPL-MCNC: 0.8 MG/DL (ref 0.5–1.4)
EAG (SMH): 189 MG/DL (ref 68–131)
GFR SERPLBLD CREATININE-BSD FMLA CKD-EPI: >60 ML/MIN/1.73/M2
GLUCOSE SERPL-MCNC: 141 MG/DL (ref 70–110)
HBA1C MFR BLD: 8.2 % (ref 4–5.6)
HDLC SERPL-MCNC: 36 MG/DL (ref 40–75)
HDLC SERPL: 21.1 % (ref 20–50)
LDLC SERPL CALC-MCNC: 109.8 MG/DL (ref 63–159)
NONHDLC SERPL-MCNC: 135 MG/DL
PHOSPHATE SERPL-MCNC: 3.1 MG/DL (ref 2.7–4.5)
POTASSIUM SERPL-SCNC: 3.9 MMOL/L (ref 3.5–5.1)
SODIUM SERPL-SCNC: 141 MMOL/L (ref 136–145)
T4 FREE SERPL-MCNC: 1.15 NG/DL (ref 0.71–1.51)
TRIGL SERPL-MCNC: 126 MG/DL (ref 30–150)
TSH SERPL-ACNC: 1.34 UIU/ML (ref 0.4–4)

## 2025-07-14 PROCEDURE — 83718 ASSAY OF LIPOPROTEIN: CPT

## 2025-07-14 PROCEDURE — 36415 COLL VENOUS BLD VENIPUNCTURE: CPT

## 2025-07-14 PROCEDURE — 84439 ASSAY OF FREE THYROXINE: CPT

## 2025-07-14 PROCEDURE — 83036 HEMOGLOBIN GLYCOSYLATED A1C: CPT

## 2025-07-14 PROCEDURE — 84100 ASSAY OF PHOSPHORUS: CPT

## 2025-07-23 ENCOUNTER — OFFICE VISIT (OUTPATIENT)
Dept: ENDOCRINOLOGY | Facility: CLINIC | Age: 55
End: 2025-07-23
Payer: OTHER GOVERNMENT

## 2025-07-23 VITALS
WEIGHT: 265 LBS | HEART RATE: 96 BPM | BODY MASS INDEX: 40.16 KG/M2 | HEIGHT: 68 IN | OXYGEN SATURATION: 95 % | TEMPERATURE: 99 F | DIASTOLIC BLOOD PRESSURE: 80 MMHG | SYSTOLIC BLOOD PRESSURE: 140 MMHG

## 2025-07-23 DIAGNOSIS — E11.65 TYPE 2 DIABETES MELLITUS WITH HYPERGLYCEMIA, WITH LONG-TERM CURRENT USE OF INSULIN: Primary | ICD-10-CM

## 2025-07-23 DIAGNOSIS — Z79.4 TYPE 2 DIABETES MELLITUS WITH HYPERGLYCEMIA, WITH LONG-TERM CURRENT USE OF INSULIN: Primary | ICD-10-CM

## 2025-07-23 DIAGNOSIS — E11.42 TYPE 2 DIABETES MELLITUS WITH DIABETIC POLYNEUROPATHY, WITH LONG-TERM CURRENT USE OF INSULIN: ICD-10-CM

## 2025-07-23 DIAGNOSIS — Z79.4 TYPE 2 DIABETES MELLITUS WITH DIABETIC POLYNEUROPATHY, WITH LONG-TERM CURRENT USE OF INSULIN: ICD-10-CM

## 2025-07-23 DIAGNOSIS — E03.9 HYPOTHYROIDISM, UNSPECIFIED TYPE: ICD-10-CM

## 2025-07-23 DIAGNOSIS — E66.9 OBESITY (BMI 30-39.9): ICD-10-CM

## 2025-07-23 DIAGNOSIS — I10 ESSENTIAL HYPERTENSION: ICD-10-CM

## 2025-07-23 DIAGNOSIS — E55.9 HYPOVITAMINOSIS D: ICD-10-CM

## 2025-07-23 DIAGNOSIS — E61.1 IRON DEFICIENCY: ICD-10-CM

## 2025-07-23 DIAGNOSIS — E78.2 MIXED HYPERLIPIDEMIA: ICD-10-CM

## 2025-07-23 PROCEDURE — G2211 COMPLEX E/M VISIT ADD ON: HCPCS | Mod: ,,, | Performed by: PHYSICIAN ASSISTANT

## 2025-07-23 PROCEDURE — 99999 PR PBB SHADOW E&M-EST. PATIENT-LVL V: CPT | Mod: PBBFAC,,, | Performed by: PHYSICIAN ASSISTANT

## 2025-07-23 PROCEDURE — 99214 OFFICE O/P EST MOD 30 MIN: CPT | Mod: S$PBB,,, | Performed by: PHYSICIAN ASSISTANT

## 2025-07-23 PROCEDURE — 99215 OFFICE O/P EST HI 40 MIN: CPT | Mod: PBBFAC,PO | Performed by: PHYSICIAN ASSISTANT

## 2025-07-23 RX ORDER — TIRZEPATIDE 15 MG/.5ML
15 INJECTION, SOLUTION SUBCUTANEOUS
Qty: 4 PEN | Refills: 11 | Status: SHIPPED | OUTPATIENT
Start: 2025-07-23

## 2025-07-23 RX ORDER — PIOGLITAZONE 15 MG/1
15 TABLET ORAL DAILY
Qty: 90 TABLET | Refills: 3 | Status: SHIPPED | OUTPATIENT
Start: 2025-07-23 | End: 2026-07-23

## 2025-07-23 NOTE — PROGRESS NOTES
CC: This 55 y.o. female presents for management of Diabetes Mellitus  and chronic conditions pending review including HTN, HLP    HPI: was diagnosed with T2DM in >10 years on lab work.   Has never been hospitalized r/t DM.  Family hx of DM: parents, aunts, uncles  Fhx of thyroid disease: gf had thyroid cancer, aunts,   Denies missing doses of DM medication.   hypoglycemia at home: none  monitoring BG at home:  Not checking    Diet:   1 meal daily.  Avoids sugary beverages.     Exercise: none. Active w/ two grandkids during the week.    CURRENT DM MEDS: Mounjaro 12.5 mg weekly, Metformin 1000 qd, Jardiance 25 mg daily      Standards of Care:  Eye exam:10/24  Podiatry exam: 9/23 Follows w/ Dr. Moreno  DE: last year    DEXA scan: 7/24 wnl.   On ergo 3x weekly.    MNG  S/p left thyroidectomy 12/21-benign  Thyroid u/s 6/24 shows a 2.3 cm mass in the right thyroid.   + occ sob and hoarseness. No dysphagia.   FNAs were benign of both nodules 6/21.     Hypothyroidism  Synthroid 125 mcg qd  + fatigue, constipation, heat intolerance, anxiety, wt loss.  No diarrhea or tremors.    PMHx, PSHx: reviewed in epic.  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     Wt Readings from Last 6 Encounters:   07/23/25 120.2 kg (264 lb 15.9 oz)   06/30/25 120.7 kg (266 lb 1.5 oz)   05/05/25 98.7 kg (217 lb 11.2 oz)   04/16/25 122.5 kg (270 lb)   04/02/25 122.5 kg (270 lb)   04/01/25 123.4 kg (272 lb)      ROS:   Gen: Appetite good, + wt loss 8 lbs, denies fatigue and weakness.  Skin: Skin is intact and heals well, no rashes, no hair changes  Eyes: Denies visual disturbances  Ears: right ear pain  Resp: no SOB or AGUILAR, no cough  Cardiac: No palpitations, chest pain, no edema   GI:  No nausea or vomiting, diarrhea, constipation, or abdominal pain.  /GYN:+ night sweats, No nocturia, burning or pain.   MS/Neuro: + knee pain, Denies numbness/ tingling in BLE; Gait steady, speech clear  Psych: Denies drug/ETOH abuse, no hx of depression.  Other  "systems: negative.    BP (!) 140/80   Pulse 96   Temp 98.7 °F (37.1 °C) (Oral)   Ht 5' 8" (1.727 m)   Wt 120.2 kg (264 lb 15.9 oz)   SpO2 95%   BMI 40.29 kg/m²      PE:  GENERAL: middle aged female,well developed, well nourished.  EAR: +bulging tm  PSYCH: AAOx3, appropriate mood and affect, pleasant expression, conversant, appears relaxed, well groomed.   EYES: Conjunctiva, corneas clear  CHEST: Resp even and unlabored,   SKIN: Skin warm and dry no acanthosis nigracans.  11/24  Foot Exam: no sores or macerations noted.     Protective Sensation (w/ 10 gram monofilament):  Right: Intact  Left: Intact    Visual Inspection:  Normal -  Bilateral, Nails Intact - without Evidence of Foot Deformity- Bilateral, and Dry Skin -  Bilateral    Pedal Pulses:   Right: Present  Left: Present    Posterior Tibialis Pulses:   Right:Present  Left: Present     Vibratory Sensation  Right:Positive  Left:Positive   Personally reviewed labs below:    Lab Visit on 07/14/2025   Component Date Value Ref Range Status    Cholesterol Total 07/14/2025 171  120 - 199 mg/dL Final    The National Cholesterol Education Program (NCEP) has set the  following guidelines (reference ranges) for Cholesterol:  Optimal.....................<200 mg/dL  Borderline High.............200-239 mg/dL  High........................> or = 240 mg/dL    Triglyceride 07/14/2025 126  30 - 150 mg/dL Final    The National Cholesterol Education Program (NCEP) has set the  following guidelines (reference values) for triglycerides:  Normal......................<150 mg/dL  Borderline High.............150-199 mg/dL  High........................200-499 mg/dL      HDL Cholesterol 07/14/2025 36 (L)  40 - 75 mg/dL Final    The National Cholesterol Education Program (NCEP) has set the  following guidelines (reference values) for HDL Cholesterol:  Low...............<40 mg/dL  Optimal...........>60 mg/dL    LDL Cholesterol 07/14/2025 109.8  63.0 - 159.0 mg/dL Final    The National " Cholesterol Education Program (NCEP) has set the  following guidelines (reference values) for LDL Cholesterol:  Optimal.......................<130 mg/dL  Borderline High...............130-159 mg/dL  High..........................160-189 mg/dL  Very High.....................>190 mg/dL      HDL/Cholesterol Ratio 07/14/2025 21.1  20.0 - 50.0 % Final    Cholesterol/HDL Ratio 07/14/2025 4.8  2.0 - 5.0 Final    Non HDL Cholesterol 07/14/2025 135  mg/dL Final    Risk category and Non-HDL cholesterol goals:  Coronary heart disease (CHD)or equivalent (10-year risk of CHD >20%):  Non-HDL cholesterol goal     <130 mg/dL  Two or more CHD risk factors and 10-year risk of CHD <= 20%:  Non-HDL cholesterol goal     <160 mg/dL  0 to 1 CHD risk factor:  Non-HDL cholesterol goal     <190 mg/dL    TSH 07/14/2025 1.342  0.400 - 4.000 uIU/mL Final    Free T4 07/14/2025 1.15  0.71 - 1.51 ng/dL Final    Hemoglobin A1c 07/14/2025 8.2 (H)  4.0 - 5.6 % Final    ADA Screening Guidelines:  5.7-6.4%  Consistent with prediabetes  >or=6.5%  Consistent with diabetes    High levels of fetal hemoglobin interfere with the HbA1C  assay. Heterozygous hemoglobin variants (HbS, HgC, etc)do  not significantly interfere with this assay.   However, presence of multiple variants may affect accuracy.    Estimated Average Glucose 07/14/2025 189 (H)  68 - 131 mg/dL Final    Sodium 07/14/2025 141  136 - 145 mmol/L Final    Potassium 07/14/2025 3.9  3.5 - 5.1 mmol/L Final    Chloride 07/14/2025 106  95 - 110 mmol/L Final    CO2 07/14/2025 25  23 - 29 mmol/L Final    Glucose 07/14/2025 141 (H)  70 - 110 mg/dL Final    BUN 07/14/2025 13  6 - 20 mg/dL Final    Creatinine 07/14/2025 0.8  0.5 - 1.4 mg/dL Final    Calcium 07/14/2025 9.3  8.7 - 10.5 mg/dL Final    Albumin 07/14/2025 4.0  3.5 - 5.2 g/dL Final    Phosphorus Level 07/14/2025 3.1  2.7 - 4.5 mg/dL Final    Anion Gap 07/14/2025 10  8 - 16 mmol/L Final    eGFR 07/14/2025 >60  >60 mL/min/1.73/m2 Final   Lab  Visit on 06/30/2025   Component Date Value Ref Range Status    Alternaria alternata, IgE 06/30/2025 <0.10  <0.10 kU/L Final    Alternaria alternata, Class 06/30/2025 CLASS 0   Final    Aspergillus fumigatus, IgE 06/30/2025 <0.10  <0.10 kU/L Final    Aspergillus fumigatus, Class 06/30/2025 CLASS 0   Final    [place] Grass, IgE 06/30/2025 0.11 (H)  <0.10 kU/L Final    [place] Grass, Class 06/30/2025 CLASS 0/1   Final    Common Silver Birch, IgE 06/30/2025 <0.10  <0.10 kU/L Final    Common Silver Birch, Class 06/30/2025 CLASS 0   Final    Cat Dander, IgE 06/30/2025 <0.10  <0.10 kU/L Final    Cat Dander, Class 06/30/2025 CLASS 0   Final    Cladosporium herbarum, IgE 06/30/2025 <0.10  <0.10 kU/L Final    Cladosporium herbarum, Class 06/30/2025 CLASS 0   Final    Cockroach, [place], IgE 06/30/2025 0.12 (H)  <0.10 kU/L Final    Cockroach, [place]ian, Class 06/30/2025 CLASS 0/1   Final    Common Ragweed (short), IgE 06/30/2025 0.13 (H)  <0.10 kU/L Final    Common Ragweed (short), Class 06/30/2025 CLASS 0/1   Final    Dermatophagoides farinae, IgE 06/30/2025 3.05 (H)  <0.10 kU/L Final    Dermatophagoides farinae Class 06/30/2025 CLASS 2   Final    Dermatophagoides pteronyssinus, IgE 06/30/2025 2.26 (H)  <0.10 kU/L Final    Dermatophagoides pteronyssinus Cla* 06/30/2025 CLASS 2   Final    Dog Dander, IgE 06/30/2025 <0.10  <0.10 kU/L Final    Dog Dander, Class 06/30/2025 CLASS 0   Final    Elm, IgE 06/30/2025 0.16 (H)  <0.10 kU/L Final    Elm, Class 06/30/2025 CLASS 0/1   Final    Maple ([place]), IgE 06/30/2025 0.12 (H)  <0.10 kU/L Final    Maple ([place]), Class 06/30/2025 CLASS 0/1   Final    [place] Juniper, IgE 06/30/2025 <0.10  <0.10 kU/L Final    Mountain Juniper Class 06/30/2025 CLASS 0   Final    Mouse Urine Proteins, IgE 06/30/2025 <0.10  <0.10 kU/L Final    Mouse Urine Proteins Class 06/30/2025 CLASS 0   Final    [place], IgE 06/30/2025 <0.10  <0.10 kU/L Final    [place], Class 06/30/2025 CLASS 0   Final    [place],  IgE 06/30/2025 0.10  <0.10 kU/L Final    [place], Class 06/30/2025 CLASS 0/1   Final    Pecan, [place], IgE 06/30/2025 <0.10  <0.10 kU/L Final    Pecan, [place], Class 06/30/2025 CLASS 0   Final    Penicillium chrysogenum, IgE 06/30/2025 <0.10  <0.10 kU/L Final    Penicillium chrysogenum, Class 06/30/2025 CLASS 0   Final    Rough [place], IgE 06/30/2025 0.13 (H)  <0.10 kU/L Final    Rough [place]er, Class 06/30/2025 CLASS 0/1   Final    Common Pigweed, IgE 06/30/2025 <0.10  <0.10 kU/L Final    Common Pigweed, Class 06/30/2025 CLASS 0   Final    [place] Grass, IgE 06/30/2025 0.17 (H)  <0.10 kU/L Final    Diogo Grass, Class 06/30/2025 CLASS 0/1   Final    [place], IgE 06/30/2025 0.14 (H)  <0.10 kU/L Final    [place], Class 06/30/2025 CLASS 0/1   Final    IgE 06/30/2025 150.0 (H)  <114.0 IU/mL Final    Allergy Interpretation 06/30/2025 See Below   Final                            Level of Allergen  CLASS   kU/L            Specific IgE Antibody  -----   -----------     ---------------------  0       <0.10           Undetectable  0/1     0.10 - 0.34     Very Low Level  1       0.35 - 0.69     Low Level  2       0.70 - 3.49     Moderate Level  3       3.50 - 17.4     High Level  4       17.5 - 49.9     Very High Level  5       50.0 - 100.0    Very High Level  6       >100.0          Very High Level     Test performed at [facility],  [address] [place]  [address] [phone]  Nasreen Davidson MD, PhD - Medical Director    Tryptase 06/30/2025 4.7  <11.5 ng/mL Final       Test Performed by:  [facility]  [address] [place] [id number]  : Rowdy Valera Ph.D.; CLIA# [id number]       ASSESSMENT and PLAN:    1. Type 2 diabetes mellitus with hyperglycemia, with long-term current use of insulin  Hemoglobin A1C    US Thyroid    Lipid Panel      2. Type 2 diabetes mellitus with diabetic polyneuropathy, with long-term current  use of insulin        3. Essential hypertension        4. Mixed hyperlipidemia        5. Obesity (BMI 30-39.9)        6. Hypothyroidism, unspecified type        7. Iron deficiency        8. Hypovitaminosis D  Vitamin D        T2DM with hyperglycemia, neuropathy-  A1c is elevated.   Increase Mounjaro to 15 mg weekly.  Start Actos 15 mg daily.  Continue metformin & jardiance.    Discussed A1c and BG goals.  Advised to have an eye exam.   Reviewed  hypoglycemia, s/s and appropriate tx.   Instructed to monitor BG and bring meter/ log to every clinic visit.   - takes ASA, ACEi, statin  Neuropathy- stable-continue gabapentin to 400 mg bid.  HTN -stable, continue meds as previously prescribed and monitor.   HLP -elevated  LDL , Could have increased from bs. Recheck next time. Continue crestor to 40 mg daily, LFTs WNL. Not fasting on last test. Will add zetia if still elevated.  Obesity-Body mass index is 40.29 kg/m². Continue exercise and diet.  Hypovitaminosis d-low last time-continue ergocalciferol to 3x weekly.   Postmenopausal-dexa scan 7/26.  Vitamin B 12 rpknwljkeo-ncybbi-lspmwrrc injections.  Nodular thyroid disease- S/p left thyroidectomy. Repeat u/s next time.  Akqmbdpismwnbl-oinlgt-nmjkkczi synthroid to 125 mcg qd.  Iron deficiency-stable-monitor-continue iron.    Follow-up:    3 months with fasting lab prior -A1c vd,lp,  & us

## 2025-07-23 NOTE — PATIENT INSTRUCTIONS
Medication Changes  Increase Mounjaro to 15 mg weekly.       Start Actos 15 mg daily.         Continue Metformin & Jardiance.

## 2025-08-12 DIAGNOSIS — Z79.4 TYPE 2 DIABETES MELLITUS WITH DIABETIC POLYNEUROPATHY, WITH LONG-TERM CURRENT USE OF INSULIN: ICD-10-CM

## 2025-08-12 DIAGNOSIS — I10 ESSENTIAL HYPERTENSION: ICD-10-CM

## 2025-08-12 DIAGNOSIS — E11.42 TYPE 2 DIABETES MELLITUS WITH DIABETIC POLYNEUROPATHY, WITH LONG-TERM CURRENT USE OF INSULIN: ICD-10-CM

## 2025-08-12 RX ORDER — GABAPENTIN 400 MG/1
400 CAPSULE ORAL 2 TIMES DAILY
Qty: 180 CAPSULE | Refills: 3 | Status: SHIPPED | OUTPATIENT
Start: 2025-08-12

## 2025-08-12 RX ORDER — VALSARTAN AND HYDROCHLOROTHIAZIDE 160; 25 MG/1; MG/1
1 TABLET, FILM COATED ORAL
Qty: 90 TABLET | Refills: 0 | Status: SHIPPED | OUTPATIENT
Start: 2025-08-12